# Patient Record
Sex: FEMALE | Race: OTHER | Employment: UNEMPLOYED | URBAN - METROPOLITAN AREA
[De-identification: names, ages, dates, MRNs, and addresses within clinical notes are randomized per-mention and may not be internally consistent; named-entity substitution may affect disease eponyms.]

---

## 2024-02-17 ENCOUNTER — HOSPITAL ENCOUNTER (INPATIENT)
Facility: HOSPITAL | Age: 54
LOS: 10 days | Discharge: NURSING, REHAB OR LONG-TERM CARE | DRG: 602 | End: 2024-02-27
Attending: EMERGENCY MEDICINE | Admitting: INTERNAL MEDICINE
Payer: COMMERCIAL

## 2024-02-17 DIAGNOSIS — L03.115 CELLULITIS OF RIGHT LOWER EXTREMITY: Primary | ICD-10-CM

## 2024-02-17 PROBLEM — D64.9 ANEMIA: Status: ACTIVE | Noted: 2024-02-17

## 2024-02-17 PROBLEM — Z21 ASYMPTOMATIC HIV INFECTION, WITH NO HISTORY OF HIV-RELATED ILLNESS (HCC): Status: ACTIVE | Noted: 2024-02-17

## 2024-02-17 PROBLEM — E66.01 CLASS 3 SEVERE OBESITY DUE TO EXCESS CALORIES WITH SERIOUS COMORBIDITY AND BODY MASS INDEX (BMI) OF 60.0 TO 69.9 IN ADULT (HCC): Status: ACTIVE | Noted: 2024-02-17

## 2024-02-17 PROBLEM — J96.01 ACUTE RESPIRATORY FAILURE WITH HYPOXIA (HCC): Status: ACTIVE | Noted: 2024-02-17

## 2024-02-17 PROBLEM — R26.2 AMBULATORY DYSFUNCTION: Status: ACTIVE | Noted: 2024-02-17

## 2024-02-17 PROBLEM — E66.813 CLASS 3 SEVERE OBESITY DUE TO EXCESS CALORIES WITH SERIOUS COMORBIDITY AND BODY MASS INDEX (BMI) OF 60.0 TO 69.9 IN ADULT (HCC): Status: ACTIVE | Noted: 2024-02-17

## 2024-02-17 PROBLEM — E87.20 LACTIC ACIDEMIA: Status: ACTIVE | Noted: 2024-02-17

## 2024-02-17 PROBLEM — E66.2 OBESITY HYPOVENTILATION SYNDROME (HCC): Status: ACTIVE | Noted: 2024-02-17

## 2024-02-17 LAB
ALBUMIN SERPL BCP-MCNC: 3.9 G/DL (ref 3.5–5)
ALP SERPL-CCNC: 46 U/L (ref 34–104)
ALT SERPL W P-5'-P-CCNC: 38 U/L (ref 7–52)
ANION GAP SERPL CALCULATED.3IONS-SCNC: 9 MMOL/L
AST SERPL W P-5'-P-CCNC: 29 U/L (ref 13–39)
BASOPHILS # BLD AUTO: 0.04 THOUSANDS/ÂΜL (ref 0–0.1)
BASOPHILS NFR BLD AUTO: 1 % (ref 0–1)
BILIRUB SERPL-MCNC: 0.45 MG/DL (ref 0.2–1)
BUN SERPL-MCNC: 12 MG/DL (ref 5–25)
CALCIUM SERPL-MCNC: 9.6 MG/DL (ref 8.4–10.2)
CHLORIDE SERPL-SCNC: 106 MMOL/L (ref 96–108)
CO2 SERPL-SCNC: 26 MMOL/L (ref 21–32)
CREAT SERPL-MCNC: 0.82 MG/DL (ref 0.6–1.3)
EOSINOPHIL # BLD AUTO: 0.19 THOUSAND/ÂΜL (ref 0–0.61)
EOSINOPHIL NFR BLD AUTO: 4 % (ref 0–6)
ERYTHROCYTE [DISTWIDTH] IN BLOOD BY AUTOMATED COUNT: 18.4 % (ref 11.6–15.1)
GFR SERPL CREATININE-BSD FRML MDRD: 81 ML/MIN/1.73SQ M
GLUCOSE SERPL-MCNC: 105 MG/DL (ref 65–140)
HCT VFR BLD AUTO: 36.6 % (ref 34.8–46.1)
HGB BLD-MCNC: 11 G/DL (ref 11.5–15.4)
IMM GRANULOCYTES # BLD AUTO: 0.02 THOUSAND/UL (ref 0–0.2)
IMM GRANULOCYTES NFR BLD AUTO: 0 % (ref 0–2)
LACTATE SERPL-SCNC: 2.4 MMOL/L (ref 0.5–2)
LYMPHOCYTES # BLD AUTO: 1.29 THOUSANDS/ÂΜL (ref 0.6–4.47)
LYMPHOCYTES NFR BLD AUTO: 26 % (ref 14–44)
MCH RBC QN AUTO: 24.6 PG (ref 26.8–34.3)
MCHC RBC AUTO-ENTMCNC: 30.1 G/DL (ref 31.4–37.4)
MCV RBC AUTO: 82 FL (ref 82–98)
MONOCYTES # BLD AUTO: 0.44 THOUSAND/ÂΜL (ref 0.17–1.22)
MONOCYTES NFR BLD AUTO: 9 % (ref 4–12)
NEUTROPHILS # BLD AUTO: 2.92 THOUSANDS/ÂΜL (ref 1.85–7.62)
NEUTS SEG NFR BLD AUTO: 60 % (ref 43–75)
NRBC BLD AUTO-RTO: 0 /100 WBCS
PLATELET # BLD AUTO: 269 THOUSANDS/UL (ref 149–390)
PMV BLD AUTO: 9.3 FL (ref 8.9–12.7)
POTASSIUM SERPL-SCNC: 3.8 MMOL/L (ref 3.5–5.3)
PROT SERPL-MCNC: 7 G/DL (ref 6.4–8.4)
RBC # BLD AUTO: 4.47 MILLION/UL (ref 3.81–5.12)
SODIUM SERPL-SCNC: 141 MMOL/L (ref 135–147)
WBC # BLD AUTO: 4.9 THOUSAND/UL (ref 4.31–10.16)

## 2024-02-17 PROCEDURE — 99223 1ST HOSP IP/OBS HIGH 75: CPT | Performed by: INTERNAL MEDICINE

## 2024-02-17 PROCEDURE — 36415 COLL VENOUS BLD VENIPUNCTURE: CPT | Performed by: NURSE PRACTITIONER

## 2024-02-17 PROCEDURE — 87040 BLOOD CULTURE FOR BACTERIA: CPT | Performed by: NURSE PRACTITIONER

## 2024-02-17 PROCEDURE — 99283 EMERGENCY DEPT VISIT LOW MDM: CPT

## 2024-02-17 PROCEDURE — 80053 COMPREHEN METABOLIC PANEL: CPT | Performed by: NURSE PRACTITIONER

## 2024-02-17 PROCEDURE — 83605 ASSAY OF LACTIC ACID: CPT | Performed by: NURSE PRACTITIONER

## 2024-02-17 PROCEDURE — 85025 COMPLETE CBC W/AUTO DIFF WBC: CPT | Performed by: NURSE PRACTITIONER

## 2024-02-17 PROCEDURE — 96367 TX/PROPH/DG ADDL SEQ IV INF: CPT

## 2024-02-17 PROCEDURE — 96365 THER/PROPH/DIAG IV INF INIT: CPT

## 2024-02-17 PROCEDURE — 99285 EMERGENCY DEPT VISIT HI MDM: CPT | Performed by: NURSE PRACTITIONER

## 2024-02-17 PROCEDURE — 96375 TX/PRO/DX INJ NEW DRUG ADDON: CPT

## 2024-02-17 RX ORDER — DARUNAVIR, COBICISTAT, EMTRICITABINE, AND TENOFOVIR ALAFENAMIDE 800; 150; 200; 10 MG/1; MG/1; MG/1; MG/1
1 TABLET, FILM COATED ORAL DAILY
COMMUNITY

## 2024-02-17 RX ORDER — BACLOFEN 10 MG/1
20 TABLET ORAL 3 TIMES DAILY
Status: DISCONTINUED | OUTPATIENT
Start: 2024-02-18 | End: 2024-02-27 | Stop reason: HOSPADM

## 2024-02-17 RX ORDER — CEFTRIAXONE 1 G/50ML
1000 INJECTION, SOLUTION INTRAVENOUS EVERY 24 HOURS
Status: DISCONTINUED | OUTPATIENT
Start: 2024-02-18 | End: 2024-02-17

## 2024-02-17 RX ORDER — ENOXAPARIN SODIUM 100 MG/ML
30 INJECTION SUBCUTANEOUS EVERY 12 HOURS SCHEDULED
Status: DISCONTINUED | OUTPATIENT
Start: 2024-02-17 | End: 2024-02-17

## 2024-02-17 RX ORDER — GABAPENTIN 100 MG/1
100 CAPSULE ORAL 3 TIMES DAILY
Status: DISCONTINUED | OUTPATIENT
Start: 2024-02-17 | End: 2024-02-27 | Stop reason: HOSPADM

## 2024-02-17 RX ORDER — SODIUM CHLORIDE, SODIUM GLUCONATE, SODIUM ACETATE, POTASSIUM CHLORIDE, MAGNESIUM CHLORIDE, SODIUM PHOSPHATE, DIBASIC, AND POTASSIUM PHOSPHATE .53; .5; .37; .037; .03; .012; .00082 G/100ML; G/100ML; G/100ML; G/100ML; G/100ML; G/100ML; G/100ML
1000 INJECTION, SOLUTION INTRAVENOUS ONCE
Status: COMPLETED | OUTPATIENT
Start: 2024-02-17 | End: 2024-02-18

## 2024-02-17 RX ORDER — ENOXAPARIN SODIUM 100 MG/ML
60 INJECTION SUBCUTANEOUS EVERY 12 HOURS SCHEDULED
Status: DISCONTINUED | OUTPATIENT
Start: 2024-02-17 | End: 2024-02-27 | Stop reason: HOSPADM

## 2024-02-17 RX ORDER — HYDRALAZINE HYDROCHLORIDE 20 MG/ML
10 INJECTION INTRAMUSCULAR; INTRAVENOUS EVERY 6 HOURS PRN
Status: DISCONTINUED | OUTPATIENT
Start: 2024-02-17 | End: 2024-02-27 | Stop reason: HOSPADM

## 2024-02-17 RX ORDER — SODIUM CHLORIDE, SODIUM GLUCONATE, SODIUM ACETATE, POTASSIUM CHLORIDE, MAGNESIUM CHLORIDE, SODIUM PHOSPHATE, DIBASIC, AND POTASSIUM PHOSPHATE .53; .5; .37; .037; .03; .012; .00082 G/100ML; G/100ML; G/100ML; G/100ML; G/100ML; G/100ML; G/100ML
1000 INJECTION, SOLUTION INTRAVENOUS ONCE
Status: COMPLETED | OUTPATIENT
Start: 2024-02-17 | End: 2024-02-17

## 2024-02-17 RX ORDER — OXYCODONE HYDROCHLORIDE 5 MG/1
5 TABLET ORAL EVERY 4 HOURS PRN
Status: DISCONTINUED | OUTPATIENT
Start: 2024-02-17 | End: 2024-02-22

## 2024-02-17 RX ORDER — CEFTRIAXONE 2 G/50ML
2000 INJECTION, SOLUTION INTRAVENOUS EVERY 24 HOURS
Status: DISCONTINUED | OUTPATIENT
Start: 2024-02-18 | End: 2024-02-22

## 2024-02-17 RX ORDER — GABAPENTIN 300 MG/1
300 CAPSULE ORAL 3 TIMES DAILY
COMMUNITY
End: 2024-02-27

## 2024-02-17 RX ORDER — CEFTRIAXONE 1 G/50ML
1000 INJECTION, SOLUTION INTRAVENOUS ONCE
Status: COMPLETED | OUTPATIENT
Start: 2024-02-17 | End: 2024-02-17

## 2024-02-17 RX ORDER — LABETALOL HYDROCHLORIDE 5 MG/ML
10 INJECTION, SOLUTION INTRAVENOUS EVERY 6 HOURS PRN
Status: DISCONTINUED | OUTPATIENT
Start: 2024-02-17 | End: 2024-02-27 | Stop reason: HOSPADM

## 2024-02-17 RX ORDER — MORPHINE SULFATE 4 MG/ML
4 INJECTION, SOLUTION INTRAMUSCULAR; INTRAVENOUS ONCE
Status: COMPLETED | OUTPATIENT
Start: 2024-02-17 | End: 2024-02-17

## 2024-02-17 RX ORDER — BACLOFEN 20 MG/1
20 TABLET ORAL 3 TIMES DAILY
COMMUNITY

## 2024-02-17 RX ORDER — ACETAMINOPHEN 325 MG/1
650 TABLET ORAL EVERY 6 HOURS PRN
Status: DISCONTINUED | OUTPATIENT
Start: 2024-02-17 | End: 2024-02-27 | Stop reason: HOSPADM

## 2024-02-17 RX ORDER — HYDROMORPHONE HCL IN WATER/PF 6 MG/30 ML
0.2 PATIENT CONTROLLED ANALGESIA SYRINGE INTRAVENOUS EVERY 4 HOURS PRN
Status: DISCONTINUED | OUTPATIENT
Start: 2024-02-17 | End: 2024-02-22

## 2024-02-17 RX ORDER — HEPARIN SODIUM 5000 [USP'U]/ML
5000 INJECTION, SOLUTION INTRAVENOUS; SUBCUTANEOUS EVERY 8 HOURS SCHEDULED
Status: DISCONTINUED | OUTPATIENT
Start: 2024-02-17 | End: 2024-02-17

## 2024-02-17 RX ORDER — SODIUM CHLORIDE, SODIUM GLUCONATE, SODIUM ACETATE, POTASSIUM CHLORIDE, MAGNESIUM CHLORIDE, SODIUM PHOSPHATE, DIBASIC, AND POTASSIUM PHOSPHATE .53; .5; .37; .037; .03; .012; .00082 G/100ML; G/100ML; G/100ML; G/100ML; G/100ML; G/100ML; G/100ML
125 INJECTION, SOLUTION INTRAVENOUS CONTINUOUS
Status: DISCONTINUED | OUTPATIENT
Start: 2024-02-17 | End: 2024-02-18

## 2024-02-17 RX ADMIN — VANCOMYCIN HYDROCHLORIDE 2000 MG: 10 INJECTION, POWDER, LYOPHILIZED, FOR SOLUTION INTRAVENOUS at 22:05

## 2024-02-17 RX ADMIN — CEFTRIAXONE 1000 MG: 1 INJECTION, SOLUTION INTRAVENOUS at 21:03

## 2024-02-17 RX ADMIN — MORPHINE SULFATE 4 MG: 4 INJECTION INTRAVENOUS at 21:00

## 2024-02-17 RX ADMIN — SODIUM CHLORIDE, SODIUM GLUCONATE, SODIUM ACETATE, POTASSIUM CHLORIDE, MAGNESIUM CHLORIDE, SODIUM PHOSPHATE, DIBASIC, AND POTASSIUM PHOSPHATE 1000 ML: .53; .5; .37; .037; .03; .012; .00082 INJECTION, SOLUTION INTRAVENOUS at 21:49

## 2024-02-17 RX ADMIN — SODIUM CHLORIDE, SODIUM GLUCONATE, SODIUM ACETATE, POTASSIUM CHLORIDE, MAGNESIUM CHLORIDE, SODIUM PHOSPHATE, DIBASIC, AND POTASSIUM PHOSPHATE 1000 ML: .53; .5; .37; .037; .03; .012; .00082 INJECTION, SOLUTION INTRAVENOUS at 23:46

## 2024-02-18 ENCOUNTER — APPOINTMENT (INPATIENT)
Dept: CT IMAGING | Facility: HOSPITAL | Age: 54
DRG: 602 | End: 2024-02-18
Payer: COMMERCIAL

## 2024-02-18 PROBLEM — E87.20 LACTIC ACIDEMIA: Status: RESOLVED | Noted: 2024-02-17 | Resolved: 2024-02-18

## 2024-02-18 LAB
ALBUMIN SERPL BCP-MCNC: 3.7 G/DL (ref 3.5–5)
ALP SERPL-CCNC: 44 U/L (ref 34–104)
ALT SERPL W P-5'-P-CCNC: 33 U/L (ref 7–52)
ANION GAP SERPL CALCULATED.3IONS-SCNC: 8 MMOL/L
AST SERPL W P-5'-P-CCNC: 26 U/L (ref 13–39)
BASOPHILS # BLD AUTO: 0.03 THOUSANDS/ÂΜL (ref 0–0.1)
BASOPHILS NFR BLD AUTO: 1 % (ref 0–1)
BILIRUB SERPL-MCNC: 0.46 MG/DL (ref 0.2–1)
BUN SERPL-MCNC: 11 MG/DL (ref 5–25)
CALCIUM SERPL-MCNC: 9.1 MG/DL (ref 8.4–10.2)
CHLORIDE SERPL-SCNC: 107 MMOL/L (ref 96–108)
CO2 SERPL-SCNC: 24 MMOL/L (ref 21–32)
CREAT SERPL-MCNC: 0.73 MG/DL (ref 0.6–1.3)
EOSINOPHIL # BLD AUTO: 0.23 THOUSAND/ÂΜL (ref 0–0.61)
EOSINOPHIL NFR BLD AUTO: 5 % (ref 0–6)
ERYTHROCYTE [DISTWIDTH] IN BLOOD BY AUTOMATED COUNT: 18.4 % (ref 11.6–15.1)
GFR SERPL CREATININE-BSD FRML MDRD: 94 ML/MIN/1.73SQ M
GLUCOSE SERPL-MCNC: 99 MG/DL (ref 65–140)
HCT VFR BLD AUTO: 36.8 % (ref 34.8–46.1)
HGB BLD-MCNC: 10.6 G/DL (ref 11.5–15.4)
IMM GRANULOCYTES # BLD AUTO: 0.02 THOUSAND/UL (ref 0–0.2)
IMM GRANULOCYTES NFR BLD AUTO: 0 % (ref 0–2)
LACTATE SERPL-SCNC: 1.7 MMOL/L (ref 0.5–2)
LYMPHOCYTES # BLD AUTO: 1.66 THOUSANDS/ÂΜL (ref 0.6–4.47)
LYMPHOCYTES NFR BLD AUTO: 34 % (ref 14–44)
MAGNESIUM SERPL-MCNC: 2 MG/DL (ref 1.9–2.7)
MCH RBC QN AUTO: 24.3 PG (ref 26.8–34.3)
MCHC RBC AUTO-ENTMCNC: 28.8 G/DL (ref 31.4–37.4)
MCV RBC AUTO: 84 FL (ref 82–98)
MONOCYTES # BLD AUTO: 0.52 THOUSAND/ÂΜL (ref 0.17–1.22)
MONOCYTES NFR BLD AUTO: 11 % (ref 4–12)
NEUTROPHILS # BLD AUTO: 2.5 THOUSANDS/ÂΜL (ref 1.85–7.62)
NEUTS SEG NFR BLD AUTO: 49 % (ref 43–75)
NRBC BLD AUTO-RTO: 0 /100 WBCS
PLATELET # BLD AUTO: 104 THOUSANDS/UL (ref 149–390)
PLATELET # BLD AUTO: 255 THOUSANDS/UL (ref 149–390)
PMV BLD AUTO: 9.3 FL (ref 8.9–12.7)
POTASSIUM SERPL-SCNC: 3.9 MMOL/L (ref 3.5–5.3)
PROT SERPL-MCNC: 6.8 G/DL (ref 6.4–8.4)
RBC # BLD AUTO: 4.37 MILLION/UL (ref 3.81–5.12)
SODIUM SERPL-SCNC: 139 MMOL/L (ref 135–147)
WBC # BLD AUTO: 4.96 THOUSAND/UL (ref 4.31–10.16)

## 2024-02-18 PROCEDURE — G1004 CDSM NDSC: HCPCS

## 2024-02-18 PROCEDURE — 73701 CT LOWER EXTREMITY W/DYE: CPT

## 2024-02-18 PROCEDURE — 99232 SBSQ HOSP IP/OBS MODERATE 35: CPT | Performed by: STUDENT IN AN ORGANIZED HEALTH CARE EDUCATION/TRAINING PROGRAM

## 2024-02-18 PROCEDURE — 85025 COMPLETE CBC W/AUTO DIFF WBC: CPT | Performed by: INTERNAL MEDICINE

## 2024-02-18 PROCEDURE — 94660 CPAP INITIATION&MGMT: CPT

## 2024-02-18 PROCEDURE — 80053 COMPREHEN METABOLIC PANEL: CPT | Performed by: INTERNAL MEDICINE

## 2024-02-18 PROCEDURE — 85049 AUTOMATED PLATELET COUNT: CPT | Performed by: INTERNAL MEDICINE

## 2024-02-18 PROCEDURE — 94760 N-INVAS EAR/PLS OXIMETRY 1: CPT

## 2024-02-18 PROCEDURE — 83605 ASSAY OF LACTIC ACID: CPT | Performed by: INTERNAL MEDICINE

## 2024-02-18 PROCEDURE — 97166 OT EVAL MOD COMPLEX 45 MIN: CPT | Performed by: OCCUPATIONAL THERAPIST

## 2024-02-18 PROCEDURE — 83735 ASSAY OF MAGNESIUM: CPT | Performed by: INTERNAL MEDICINE

## 2024-02-18 RX ORDER — HYDROXYZINE HYDROCHLORIDE 25 MG/1
50 TABLET, FILM COATED ORAL EVERY 6 HOURS PRN
Status: DISCONTINUED | OUTPATIENT
Start: 2024-02-18 | End: 2024-02-23

## 2024-02-18 RX ORDER — SODIUM CHLORIDE, SODIUM GLUCONATE, SODIUM ACETATE, POTASSIUM CHLORIDE, MAGNESIUM CHLORIDE, SODIUM PHOSPHATE, DIBASIC, AND POTASSIUM PHOSPHATE .53; .5; .37; .037; .03; .012; .00082 G/100ML; G/100ML; G/100ML; G/100ML; G/100ML; G/100ML; G/100ML
75 INJECTION, SOLUTION INTRAVENOUS CONTINUOUS
Status: DISPENSED | OUTPATIENT
Start: 2024-02-18 | End: 2024-02-19

## 2024-02-18 RX ADMIN — ENOXAPARIN SODIUM 60 MG: 60 INJECTION SUBCUTANEOUS at 00:04

## 2024-02-18 RX ADMIN — HYDROMORPHONE HYDROCHLORIDE 0.2 MG: 0.2 INJECTION, SOLUTION INTRAMUSCULAR; INTRAVENOUS; SUBCUTANEOUS at 05:45

## 2024-02-18 RX ADMIN — HYDROMORPHONE HYDROCHLORIDE 0.2 MG: 0.2 INJECTION, SOLUTION INTRAMUSCULAR; INTRAVENOUS; SUBCUTANEOUS at 19:48

## 2024-02-18 RX ADMIN — HYDROMORPHONE HYDROCHLORIDE 0.2 MG: 0.2 INJECTION, SOLUTION INTRAMUSCULAR; INTRAVENOUS; SUBCUTANEOUS at 09:39

## 2024-02-18 RX ADMIN — OXYCODONE HYDROCHLORIDE 5 MG: 5 TABLET ORAL at 00:04

## 2024-02-18 RX ADMIN — HYDROXYZINE HYDROCHLORIDE 50 MG: 25 TABLET ORAL at 04:04

## 2024-02-18 RX ADMIN — HYDROMORPHONE HYDROCHLORIDE 0.2 MG: 0.2 INJECTION, SOLUTION INTRAMUSCULAR; INTRAVENOUS; SUBCUTANEOUS at 01:04

## 2024-02-18 RX ADMIN — GABAPENTIN 100 MG: 100 CAPSULE ORAL at 21:24

## 2024-02-18 RX ADMIN — VANCOMYCIN HYDROCHLORIDE 1500 MG: 5 INJECTION, POWDER, LYOPHILIZED, FOR SOLUTION INTRAVENOUS at 15:12

## 2024-02-18 RX ADMIN — IOHEXOL 100 ML: 350 INJECTION, SOLUTION INTRAVENOUS at 12:45

## 2024-02-18 RX ADMIN — BACLOFEN 20 MG: 10 TABLET ORAL at 08:09

## 2024-02-18 RX ADMIN — BACLOFEN 20 MG: 10 TABLET ORAL at 00:01

## 2024-02-18 RX ADMIN — HYDROXYZINE HYDROCHLORIDE 50 MG: 25 TABLET ORAL at 11:11

## 2024-02-18 RX ADMIN — ENOXAPARIN SODIUM 60 MG: 60 INJECTION SUBCUTANEOUS at 08:09

## 2024-02-18 RX ADMIN — HYDROMORPHONE HYDROCHLORIDE 0.2 MG: 0.2 INJECTION, SOLUTION INTRAMUSCULAR; INTRAVENOUS; SUBCUTANEOUS at 15:13

## 2024-02-18 RX ADMIN — OXYCODONE HYDROCHLORIDE 5 MG: 5 TABLET ORAL at 04:04

## 2024-02-18 RX ADMIN — CEFTRIAXONE 2000 MG: 2 INJECTION, SOLUTION INTRAVENOUS at 18:31

## 2024-02-18 RX ADMIN — GABAPENTIN 100 MG: 100 CAPSULE ORAL at 15:13

## 2024-02-18 RX ADMIN — VANCOMYCIN HYDROCHLORIDE 1500 MG: 5 INJECTION, POWDER, LYOPHILIZED, FOR SOLUTION INTRAVENOUS at 22:39

## 2024-02-18 RX ADMIN — OXYCODONE HYDROCHLORIDE 5 MG: 5 TABLET ORAL at 18:31

## 2024-02-18 RX ADMIN — GABAPENTIN 100 MG: 100 CAPSULE ORAL at 08:09

## 2024-02-18 RX ADMIN — SODIUM CHLORIDE, SODIUM GLUCONATE, SODIUM ACETATE, POTASSIUM CHLORIDE, MAGNESIUM CHLORIDE, SODIUM PHOSPHATE, DIBASIC, AND POTASSIUM PHOSPHATE 75 ML/HR: .53; .5; .37; .037; .03; .012; .00082 INJECTION, SOLUTION INTRAVENOUS at 09:23

## 2024-02-18 RX ADMIN — BACLOFEN 20 MG: 10 TABLET ORAL at 21:24

## 2024-02-18 RX ADMIN — SODIUM CHLORIDE, SODIUM GLUCONATE, SODIUM ACETATE, POTASSIUM CHLORIDE, MAGNESIUM CHLORIDE, SODIUM PHOSPHATE, DIBASIC, AND POTASSIUM PHOSPHATE 1000 ML: .53; .5; .37; .037; .03; .012; .00082 INJECTION, SOLUTION INTRAVENOUS at 01:00

## 2024-02-18 RX ADMIN — BACLOFEN 20 MG: 10 TABLET ORAL at 15:13

## 2024-02-18 RX ADMIN — ENOXAPARIN SODIUM 60 MG: 60 INJECTION SUBCUTANEOUS at 21:24

## 2024-02-18 RX ADMIN — GABAPENTIN 100 MG: 100 CAPSULE ORAL at 00:01

## 2024-02-18 NOTE — PLAN OF CARE
Problem: OCCUPATIONAL THERAPY ADULT  Goal: Performs self-care activities at highest level of function for planned discharge setting.  See evaluation for individualized goals.  Description: Treatment Interventions: ADL retraining, Functional transfer training, UE strengthening/ROM, Endurance training, Neuromuscular reeducation, Compensatory technique education, Energy conservation, Activityengagement  Equipment Recommended: Shower/Tub chair with back ($), Hip Kit ($)       See flowsheet documentation for full assessment, interventions and recommendations.   Outcome: Progressing  Note: Limitation: Decreased ADL status, Decreased UE strength, Decreased Safe judgement during ADL, Decreased endurance  Prognosis: Fair  Assessment: Patient is a 53 y.o. year old female seen for OT eval s/p admit to [facility] on 2/17/2024 with Robyn Eloy is a 53  worsening lower extremity pain on the right and PMHx of HIV, morbid obesity, prior right lower extremity infection status postdebridement and grafting. She did have a blister in the right lower extremity that had popped a few days prior to presentation.  She was treating the leg with over-the-counter medications but noticed redness and pain had been worsening in severity.Patient with active OT orders and activity orders for Activity as tolerated, Up and OOB as tolerated . OT consulted to assess ADLs/IADLs/functional mobility and assist w/ D/C planning. PTA, required (A) with ADLs, required (A) with IADLs, and required (A) for functional mobility/transfers. Patient lives in a home with childrens, able to stay on 1st floor. Patient demonstrates the following deficits impacting occupational performance: weakness, decreased strength , decreased balance, decreased activity tolerance, decreased safety awareness, increased pain, and SOB. These impairments, as well at pt’s difficulty performing ADLs, difficulty performing IADLs, difficulty performing transfers/mobility, and fall risk ,  limit pt’s ability to safely engage in all baseline areas of occupation. Pt's CLOF as follows: eating/grooming: supervision , UB ADLs: Kristan, LB ADLs: maxA and dependent, toileting: maxA and dependent, bed mobility: DNT, functional transfers: Kristan, functional mobility: Kristan, sitting/standing tolerance: P+/P+. Pt would benefit from continued skilled OT while in acute setting to address deficits as defined above and to maximize (I) w/ ADLs/functional mobility. Occupational performance areas to address include: grooming, bathing/shower, toilet hygiene, dressing, functional mobility, community mobility, and clothing management. Based on the aforementioned evaluation, functional performance deficits, and assessments, pt has been identified as a moderate complexity evaluation. At this time, recommendation for pt to receive post-acute rehabilitation services at a Level III (minimum resource intensity) due to above deficits and CLOF. OT will continue to follow pt 2-3x/wk to address the goals listed below to  w/in 10-14 days.     Rehab Resource Intensity Level, OT: III (Minimum Resource Intensity)

## 2024-02-18 NOTE — RESPIRATORY THERAPY NOTE
02/18/24 0316   Respiratory Assessment   Resp Comments placed pt on cpap for HS use   O2 Device v30 Wheezy   Non-Invasive Information   O2 Interface Device Face mask   Non-Invasive Ventilation Mode CPAP   SpO2 97 %   Non-Invasive Settings   FiO2 (%)   (1L o2 bleed in)   PEEP/CPAP (cm H2O)   (auto 8-18 cmH2o)   Rise Time 2   Humidification   (n/a)   Non-Invasive Readings   Skin Intervention Skin intact   Total Rate 18   Spontaneous Vt (mL) 1162   Spontaneous MV (mL) 21.3   Auto PEEP Observed (cm H2O) 13   I/E Ratio (Obs) 1:2.2   Leak (lpm) 21   Non-Invasive Alarms   Insp Pressure Low (cm H20) 60   MV Low (L/min) 2   High Resp Rate (BPM) 40 BPM   Apnea Interval (sec) 30     Auto 8-18 cmH2o applied but pt felt like the pressure was not enough and that it felt like she was suffocating, changed setting to auto 10-20 cmH2o but she has same complaint(s), switched to cpap +15 but pt continues to feel like she is still suffocating, changed mode to auto bipap of min epap 8 cmH2o and maximum ipap 20 cmH2o, pt is comfortable with this mode and tolerant of the two pressures

## 2024-02-18 NOTE — OCCUPATIONAL THERAPY NOTE
Occupational Therapy Evaluation     Patient Name: Robyn Lyons  Today's Date: 2/18/2024  Problem List  Principal Problem:    Cellulitis of right lower extremity  Active Problems:    Lactic acidemia    Class 3 severe obesity due to excess calories with serious comorbidity and body mass index (BMI) of 60.0 to 69.9 in adult (HCC)    Anemia    Acute respiratory failure with hypoxia (HCC)    Asymptomatic HIV infection, with no history of HIV-related illness (HCC)    Obesity hypoventilation syndrome (HCC)    Ambulatory dysfunction    Past Medical History  Past Medical History:   Diagnosis Date    HIV (human immunodeficiency virus infection) (HCC)      Past Surgical History  History reviewed. No pertinent surgical history.        02/18/24 0827   OT Last Visit   OT Visit Date 02/18/24   Note Type   Note type Evaluation   Pain Assessment   Pain Assessment Tool 0-10   Pain Score 7   Pain Location/Orientation Orientation: Bilateral;Location: Leg  (+ sciatic type pain on L; wound on R)   Hospital Pain Intervention(s) Repositioned;Ambulation/increased activity   Restrictions/Precautions   Weight Bearing Precautions Per Order No   Other Precautions Contact/isolation;Chair Alarm;Bed Alarm;O2;Fall Risk;Telemetry;Multiple lines  (As per infection control- Contact isolation; 2L 02; 02 dec to 91 with short functional mob to BSC on RA)   Home Living   Type of Home House   Home Layout Multi-level;Performs ADLs on one level;Able to live on main level with bedroom/bathroom;Stairs to enter with rails  (5 HERB)   Bathroom Shower/Tub Walk-in shower   Bathroom Toilet Raised   Bathroom Equipment Grab bars in shower   Bathroom Accessibility Accessible   Home Equipment Walker  (rollator; don't use it all the time but there when needed)   Additional Comments will need shower chair as per pt.   Prior Function   Level of [place] Needs assistance with ADLs;Needs assistance with IADLS;Needs assistance with functional mobility   Lives  With Family  (3 sons 1 daughter- ages 19, 17, 12, 5)   Receives Help From Family   IADLs Family/Friend/Other provides transportation;Family/Friend/Other provides meals;Family/Friend/Other provides medication management   Falls in the last 6 months 1 to 4  (1 time in the bathroom)   General   Additional Pertinent History Robyn Lyons is a 53 y.o. female who presents with worsening lower extremity pain on the right     PMHx of HIV, morbid obesity, prior right lower extremity infection status postdebridement and grafting     She did have a blister in the right lower extremity that had popped a few days prior to presentation.  She was treating the leg with over-the-counter medications but noticed redness and pain had been worsening in severity.   Subjective   Subjective I feel like i need oxygen   ADL   Eating Assistance 5  Supervision/Setup   Grooming Assistance 5  Supervision/Setup   UB Bathing Assistance 3  Moderate Assistance   LB Bathing Assistance 1  Total Assistance   UB Dressing Assistance 4  Minimal Assistance   LB Dressing Assistance 2  Maximal Assistance   LB Dressing Deficit   (Max A pants/underwear (can pull up when able to reach); Total A socks/shoes)   Toileting Assistance  2  Maximal Assistance   Bed Mobility   Additional Comments bed mobility not assessed; pt in side recliner and reports she sleeps in one at home   Transfers   Sit to Stand 4  Minimal assistance   Additional items Assist x 1;Armrests;Increased time required   Stand to Sit 4  Minimal assistance   Additional items Assist x 1;Increased time required   Additional Comments Min A for recliner chair 2* elevated chair.   Functional Mobility   Functional Mobility 4  Minimal assistance   Additional items Rolling walker   Balance   Static Sitting Fair   Dynamic Sitting Poor   Static Standing Fair   Dynamic Standing Fair -   Activity Tolerance   Activity Tolerance Patient limited by fatigue   Nurse Made Aware RN aware   RUE Assessment   RUE  Assessment WFL   LUE Assessment   LUE Assessment WFL   Hand Function   Gross Motor Coordination Functional   Fine Motor Coordination Functional   Psychosocial   Psychosocial (WDL) WDL   Cognition   Overall Cognitive Status WFL   Arousal/Participation Alert;Responsive;Cooperative   Attention Within functional limits   Orientation Level Oriented X4   Memory Within functional limits   Following Commands Follows all commands and directions without difficulty   Assessment   Limitation Decreased ADL status;Decreased UE strength;Decreased Safe judgement during ADL;Decreased endurance   Prognosis Fair   Assessment Patient is a 53 y.o. year old female seen for OT eval s/p admit to [facility] on 2/17/2024 with Robyn Eloy is a 53  worsening lower extremity pain on the right and PMHx of HIV, morbid obesity, prior right lower extremity infection status postdebridement and grafting. She did have a blister in the right lower extremity that had popped a few days prior to presentation.  She was treating the leg with over-the-counter medications but noticed redness and pain had been worsening in severity.Patient with active OT orders and activity orders for Activity as tolerated, Up and OOB as tolerated . OT consulted to assess ADLs/IADLs/functional mobility and assist w/ D/C planning. PTA, required (A) with ADLs, required (A) with IADLs, and required (A) for functional mobility/transfers. Patient lives in a home with childrens, able to stay on 1st floor. Patient demonstrates the following deficits impacting occupational performance: weakness, decreased strength , decreased balance, decreased activity tolerance, decreased safety awareness, increased pain, and SOB. These impairments, as well at pt’s difficulty performing ADLs, difficulty performing IADLs, difficulty performing transfers/mobility, and fall risk , limit pt’s ability to safely engage in all baseline areas of occupation. Pt's CLOF as follows: eating/grooming: supervision  , UB ADLs: Kristan, LB ADLs: maxA and dependent, toileting: maxA and dependent, bed mobility: DNT, functional transfers: Kristan, functional mobility: Kristan, sitting/standing tolerance: P+/P+. Pt would benefit from continued skilled OT while in acute setting to address deficits as defined above and to maximize (I) w/ ADLs/functional mobility. Occupational performance areas to address include: grooming, bathing/shower, toilet hygiene, dressing, functional mobility, community mobility, and clothing management. Based on the aforementioned evaluation, functional performance deficits, and assessments, pt has been identified as a moderate complexity evaluation. At this time, recommendation for pt to receive post-acute rehabilitation services at a Level III (minimum resource intensity) due to above deficits and CLOF. OT will continue to follow pt 2-3x/wk to address the goals listed below to  w/in 10-14 days.   Goals   Patient Goals to not feel so tired   LTG Time Frame 10-14   Long Term Goal 1- Pt will perf LBD/LBB with Min A with use of AE; 2- Pt will perf UBD and UBB with S/setup; 3- Pt will perf all aspects of toileting with Min A; 4- Pt will perf chair<>toilet t/f and functional mobility with Mod I; 5- Pt will perf standing sink side grooming for inc standing radha for 5 min with S/setup   Plan   Treatment Interventions ADL retraining;Functional transfer training;UE strengthening/ROM;Endurance training;Neuromuscular reeducation;Compensatory technique education;Energy conservation;Activityengagement   Goal Expiration Date 24   OT Treatment Day 0   OT Frequency 3-5x/wk   Discharge Recommendation   Rehab Resource Intensity Level, OT III (Minimum Resource Intensity)   Equipment Recommended Shower/Tub chair with back ($);Hip Kit ($)   AM-PAC Daily Activity Inpatient   Lower Body Dressing 1   Bathing 2   Toileting 2   Upper Body Dressing 3   Grooming 3   Eating 4   Daily Activity Raw Score 15   Daily Activity Standardized  Score (Calc for Raw Score >=11) 34.69   AM-PAC Applied Cognition Inpatient   Following a Speech/Presentation 4   Understanding Ordinary Conversation 4   Taking Medications 4   Remembering Where Things Are Placed or Put Away 3   Remembering List of 4-5 Errands 3   Taking Care of Complicated Tasks 3   Applied Cognition Raw Score 21   Applied Cognition Standardized Score 44.3     Jeimy Kaplan MS, OTR/L CLT

## 2024-02-18 NOTE — PROGRESS NOTES
Robyn Lyons is a 53 y.o. female who is currently ordered Vancomycin IV with management by the Pharmacy Consult service.  Relevant clinical data and objective / subjective history reviewed.  Vancomycin Assessment:  Indication and Goal AUC/Trough: Soft tissue (goal -600, trough >10)  Clinical Status:  new start  Micro:   pending  Renal Function:  SCr: 0.82 mg/dL  CrCl: 146.5 mL/min  Renal replacement: Not on dialysis  Days of Therapy: 1  Current Dose: 2000mg IV once  Vancomycin Plan:  New Dosing: then 1250mg IV q12h  Estimated AUC: 459 mcg*hr/mL  Estimated Trough: 11.8 mcg/mL  Next Level: 2/19/24 Am labs  Renal Function Monitoring: Daily BMP and UOP  Pharmacy will continue to follow closely for s/sx of nephrotoxicity, infusion reactions and appropriateness of therapy.  BMP and CBC will be ordered per protocol. We will continue to follow the patient’s culture results and clinical progress daily.    Nancy Samson, Pharmacist

## 2024-02-18 NOTE — ASSESSMENT & PLAN NOTE
Currently SpO2: 96 % requiring Nasal Cannula O2 Flow Rate (L/min): 1 L/min2L NC    At baseline, uses no supplemental, but feels she may need it    Plan:  Wean O2 as able  May need Home O2 on discharge

## 2024-02-18 NOTE — ASSESSMENT & PLAN NOTE
Patient complaining of significant shortness of breath while sleeping  May be attributed to obesity  Trial CPAP HS

## 2024-02-18 NOTE — PROGRESS NOTES
[facility]  Progress Note  Name: Robyn Lyons I  MRN: [id number]  Unit/Bed#: -01 I Date of Admission: 2/17/2024   Date of Service: 2/18/2024 I Hospital Day: 1    Assessment/Plan   Ambulatory dysfunction  Assessment & Plan  PT/OT Eval and treat  Trend Mobility Scores  Encourage appropriate mobility to achieve and improve upon HLM Goals as noted    Obesity hypoventilation syndrome (HCC)  Assessment & Plan  Patient complaining of significant shortness of breath while sleeping  May be attributed to obesity  Trial CPAP HS    Asymptomatic HIV infection, with no history of HIV-related illness (HCC)  Assessment & Plan  Continue home [place] [phone]    Acute respiratory failure with hypoxia (HCC)  Assessment & Plan    Currently SpO2: 96 % requiring Nasal Cannula O2 Flow Rate (L/min): 1 L/min2L NC    At baseline, uses no supplemental, but feels she may need it    Plan:  Wean O2 as able  May need Home O2 on discharge      Anemia  Assessment & Plan  Recent Labs     02/17/24 2050   HGB 11.0*   MCV 82     Plan:  Monitor CBC  Transfuse if Hb < 7      Class 3 severe obesity due to excess calories with serious comorbidity and body mass index (BMI) of 60.0 to 69.9 in adult ([facility])  Assessment & Plan  Body mass index is 68.99 kg/m².    Recommend incorporating a more whole foods plant-predominant diet along with decreasing consumption of red meats and processed foods  Per AHA guidelines, recommend moderate-vigorous intensity exercise for 30 minutes a day for 5 days a week or a total of 150 min/week    * Cellulitis of right lower extremity  Assessment & Plan  Reportedly had a blister in the right lower extremity that had popped a few days prior    Recent Labs     02/17/24 2050   WBC 4.90     Does not meet SIRS or sepsis criteria at this time  Initiated on IV antibiotics in the ED    Plan:  Venous duplex to rule out DVT  Continue vancomycin for now;  IV Fluid hydration  Follow up Blood clx,  wound clx, MRSA  CT LE with contrast pending  Wound care consult  PRN pain regimen  Trend fever/WBC curve  PT OT      Lactic acidemia-resolved as of 2024  Assessment & Plan  Recent Labs     24  0106   LACTICACID 2.4* 1.7     Resolved with IV fluids               VTE Pharmacologic Prophylaxis: VTE Score: 5 Moderate Risk (Score 3-4) - Pharmacological DVT Prophylaxis Ordered: enoxaparin (Lovenox).    Mobility:   Basic Mobility Inpatient Raw Score: 18  JH-HLM Goal: 6: Walk 10 steps or more  JH-HLM Achieved: 7: Walk 25 feet or more  HLM Goal achieved. Continue to encourage appropriate mobility.    Patient Centered Rounds: I performed bedside rounds with nursing staff today.   Discussions with Specialists or Other Care Team Provider: HERBERT    Education and Discussions with Family / Patient: Patient declined call to .     Total Time Spent on Date of Encounter in care of patient: 50  mins. This time was spent on one or more of the following: performing physical exam; counseling and coordination of care; obtaining or reviewing history; documenting in the medical record; reviewing/ordering tests, medications or procedures; communicating with other healthcare professionals and discussing with patient's family/caregivers.    Current Length of Stay: 1 day(s)  Current Patient Status: Inpatient   Certification Statement: The patient will continue to require additional inpatient hospital stay due to cellulitis, IV abx  Discharge Plan: Anticipate discharge in 24-48 hrs to home with home services.    Code Status: Level 1 - Full Code    Subjective:   Pt has no acute complaiints.     Objective:     Vitals:   Temp (24hrs), Av.1 °F (36.7 °C), Min:97.2 °F (36.2 °C), Max:98.8 °F (37.1 °C)    Temp:  [97.2 °F (36.2 °C)-98.8 °F (37.1 °C)] 97.6 °F (36.4 °C)  HR:  [78-94] 83  Resp:  [18-20] 18  BP: (134-174)/(70-83) 147/83  SpO2:  [92 %-98 %] 96 %  Body mass index is 69.06 kg/m².     Input and Output  Summary (last 24 hours):     Intake/Output Summary (Last 24 hours) at 2/18/2024 1431  Last data filed at 2/18/2024 0923  Gross per 24 hour   Intake 1110 ml   Output --   Net 1110 ml       Physical Exam:   Physical Exam  Constitutional:       General: She is not in acute distress.     Appearance: She is well-developed. She is obese. She is not diaphoretic.   HENT:      Head: Normocephalic and atraumatic.      Nose: Nose normal.      Mouth/Throat:      Pharynx: No oropharyngeal exudate.   Eyes:      General: No scleral icterus.        Right eye: No discharge.         Left eye: No discharge.      Conjunctiva/sclera: Conjunctivae normal.   Cardiovascular:      Rate and Rhythm: Normal rate and regular rhythm.      Heart sounds: Normal heart sounds. No murmur heard.     No friction rub. No gallop.   Pulmonary:      Effort: Pulmonary effort is normal. No respiratory distress.      Breath sounds: Normal breath sounds. No wheezing or rales.   Abdominal:      General: Bowel sounds are normal. There is no distension.      Palpations: Abdomen is soft.      Tenderness: There is no abdominal tenderness. There is no guarding.   Musculoskeletal:         General: Normal range of motion.      Cervical back: Normal range of motion and neck supple.   Skin:     General: Skin is warm.      Findings: Erythema present.      Comments: R LE  chronic scar, venostasis changes with erythema, edema, warmth. Demarcarted with marker.    Neurological:      Mental Status: She is oriented to person, place, and time.   Psychiatric:         Behavior: Behavior normal.          Additional Data:     Labs:  Results from last 7 days   Lab Units 02/18/24  0550   WBC Thousand/uL 4.96   HEMOGLOBIN g/dL 10.6*   HEMATOCRIT % 36.8   PLATELETS Thousands/uL 255   NEUTROS PCT % 49   LYMPHS PCT % 34   MONOS PCT % 11   EOS PCT % 5     Results from last 7 days   Lab Units 02/18/24  0550   SODIUM mmol/L 139   POTASSIUM mmol/L 3.9   CHLORIDE mmol/L 107   CO2 mmol/L 24    BUN mg/dL 11   CREATININE mg/dL 0.73   ANION GAP mmol/L 8   CALCIUM mg/dL 9.1   ALBUMIN g/dL 3.7   TOTAL BILIRUBIN mg/dL 0.46   ALK PHOS U/L 44   ALT U/L 33   AST U/L 26   GLUCOSE RANDOM mg/dL 99                 Results from last 7 days   Lab Units 02/18/24  0106 02/17/24  2050   LACTIC ACID mmol/L 1.7 2.4*       Lines/Drains:  Invasive Devices       Peripheral Intravenous Line  Duration             Peripheral IV 02/17/24 Right Antecubital <1 day                          Imaging: No pertinent imaging reviewed.    Recent Cultures (last 7 days):         Last 24 Hours Medication List:   Current Facility-Administered Medications   Medication Dose Route Frequency Provider Last Rate    acetaminophen  650 mg Oral Q6H PRN Som Ceron, DO      baclofen  20 mg Oral TID RENE MukherjeeNP      cefTRIAXone  2,000 mg Intravenous Q24H Som Ceron, DO      Hbnwj-Niqgk-Qezgvzpe-TenofAF  1 tablet Oral Daily Som Ceron, DO      enoxaparin  60 mg Subcutaneous Q12H LISS Som Ceron, DO      gabapentin  100 mg Oral TID Som Ceron, DO      hydrALAZINE  10 mg Intravenous Q6H PRN Som Ceron, DO      HYDROmorphone  0.2 mg Intravenous Q4H PRN Som Ceron, DO      hydrOXYzine HCL  50 mg Oral Q6H PRN RENE MukherjeeNP      labetalol  10 mg Intravenous Q6H PRN Som Ceron, DO      multi-electrolyte  75 mL/hr Intravenous Continuous Cristy Anaya DO 75 mL/hr (02/18/24 0923)    oxyCODONE  2.5 mg Oral Q4H PRN Som Ceron,       Or    oxyCODONE  5 mg Oral Q4H PRN Som Ceron, DO      vancomycin  1,500 mg Intravenous Q12H Cristy Anaya DO          Today, Patient Was Seen By: Cristy Anaya DO    **Please Note: This note may have been constructed using a voice recognition system.**

## 2024-02-18 NOTE — RESPIRATORY THERAPY NOTE
02/17/24 2342   Respiratory Assessment   Resp Comments cpap set up at bedside, pt will call when ready to wear   O2 Device v30 Wheezy   Non-Invasive Information   O2 Interface Device Face mask  (medium)   Non-Invasive Ventilation Mode CPAP   Non-Invasive Settings   FiO2 (%)   (1L o2 bleed in, pt currently on 1L o2 nc)   PEEP/CPAP (cm H2O)   (auto 8-18 cmH2o)   Rise Time 2   Humidification   (n/a dry circuit)     Pt states she used to wear cpap at home but has since lost parts, states she is scheduled to undergo another sleep study, she does not know her NIV setting, auto cpap 8-18 cmH2o will be used, pt requests face mask

## 2024-02-18 NOTE — ASSESSMENT & PLAN NOTE
PT/OT Eval and treat  Trend Mobility Scores  Encourage appropriate mobility to achieve and improve upon HLM Goals as noted

## 2024-02-18 NOTE — RESPIRATORY THERAPY NOTE
02/18/24 0316   Respiratory Assessment   Resp Comments placed pt on cpap for HS use   O2 Device v30 Wheezy   Non-Invasive Information   O2 Interface Device Face mask   Non-Invasive Ventilation Mode (S)  BiPAP   $ Intermittent NIV Yes   SpO2 97 %   $ Pulse Oximetry Spot Check Charge Completed   Non-Invasive Settings   FiO2 (%)   (1L o2 bleed in)   Rise Time 2   Humidification   (n/a)   IPAP (cm)   (auto max 20 cmH2o)   EPAP (cm)   (auto minimum 8 cmH2o)   Non-Invasive Readings   Skin Intervention Skin intact   Total Rate 18   Spontaneous Vt (mL) 1162   Spontaneous MV (mL) 21.3   Auto PEEP Observed (cm H2O) 13   I/E Ratio (Obs) 1:2.2   Leak (lpm) 21   Non-Invasive Alarms   Insp Pressure Low (cm H20) 60   MV Low (L/min) 2   High Resp Rate (BPM) 40 BPM   Apnea Interval (sec) 30

## 2024-02-18 NOTE — PLAN OF CARE
Problem: PAIN - ADULT  Goal: Verbalizes/displays adequate comfort level or baseline comfort level  Description: Interventions:  - Encourage patient to monitor pain and request assistance  - Assess pain using appropriate pain scale  - Administer analgesics based on type and severity of pain and evaluate response  - Implement non-pharmacological measures as appropriate and evaluate response  - Consider cultural and social influences on pain and pain management  - Notify physician/advanced practitioner if interventions unsuccessful or patient reports new pain  Outcome: Progressing     Problem: INFECTION - ADULT  Goal: Absence or prevention of progression during hospitalization  Description: INTERVENTIONS:  - Assess and monitor for signs and symptoms of infection  - Monitor lab/diagnostic results  - Monitor all insertion sites, i.e. indwelling lines, tubes, and drains  - Monitor endotracheal if appropriate and nasal secretions for changes in amount and color  - Pryor appropriate cooling/warming therapies per order  - Administer medications as ordered  - Instruct and encourage patient and family to use good hand hygiene technique  - Identify and instruct in appropriate isolation precautions for identified infection/condition  Outcome: Progressing  Goal: Absence of fever/infection during neutropenic period  Description: INTERVENTIONS:  - Monitor WBC    Outcome: Progressing     Problem: SAFETY ADULT  Goal: Patient will remain free of falls  Description: INTERVENTIONS:  - Educate patient/family on patient safety including physical limitations  - Instruct patient to call for assistance with activity   - Consult OT/PT to assist with strengthening/mobility   - Keep Call bell within reach  - Keep bed low and locked with side rails adjusted as appropriate  - Keep care items and personal belongings within reach  - Initiate and maintain comfort rounds  - Make Fall Risk Sign visible to staff  - Apply yellow socks and bracelet  for high fall risk patients  - Consider moving patient to room near nurses station  Outcome: Progressing  Goal: Maintain or return to baseline ADL function  Description: INTERVENTIONS:  -  Assess patient's ability to carry out ADLs; assess patient's baseline for ADL function and identify physical deficits which impact ability to perform ADLs (bathing, care of mouth/teeth, toileting, grooming, dressing, etc.)  - Assess/evaluate cause of self-care deficits   - Assess range of motion  - Assess patient's mobility; develop plan if impaired  - Assess patient's need for assistive devices and provide as appropriate  - Encourage maximum independence but intervene and supervise when necessary  - Involve family in performance of ADLs  - Assess for home care needs following discharge   - Consider OT consult to assist with ADL evaluation and planning for discharge  - Provide patient education as appropriate  Outcome: Progressing  Goal: Maintains/Returns to pre admission functional level  Description: INTERVENTIONS:  - Perform AM-PAC 6 Click Basic Mobility/ Daily Activity assessment daily.  - Set and communicate daily mobility goal to care team and patient/family/caregiver.   - Collaborate with rehabilitation services on mobility goals if consulted  - Out of bed for toileting  - Record patient progress and toleration of activity level   Outcome: Progressing     Problem: RESPIRATORY - ADULT  Goal: Achieves optimal ventilation and oxygenation  Description: INTERVENTIONS:  - Assess for changes in respiratory status  - Assess for changes in mentation and behavior  - Position to facilitate oxygenation and minimize respiratory effort  - Oxygen administered by appropriate delivery if ordered  - Initiate smoking cessation education as indicated  - Encourage broncho-pulmonary hygiene including cough, deep breathe, Incentive Spirometry  - Assess the need for suctioning and aspirate as needed  - Assess and instruct to report SOB or any  respiratory difficulty  - Respiratory Therapy support as indicated  Outcome: Progressing

## 2024-02-18 NOTE — ED PROVIDER NOTES
History  Chief Complaint   Patient presents with    Leg Pain     Pt arrived via ems with c/o a blister that was on her rle and it popped a couple of days ago. Pt was treating her leg with OTC meds. Pt states it is now red and getting worse.      This is a 53-year-old female who suffers from unhealthy weight.  Presenting here with a chief complaint of a blister that popped over her right lower extremity seems to be infected.  She has a history of right lower extremity infection in the past that resulted in debridement and grafting.  She denies any history of diabetes but does have a history of HIV.  Currently denying any fever or chills.      Leg Pain  Associated symptoms: no back pain, no fatigue and no fever        Prior to Admission Medications   Prescriptions Last Dose Informant Patient Reported? Taking?   Btgfs-Dgtmv-Haanckox-TenofAF (Symtuza) [id number] MG TABS   Yes No   Sig: Take 1 tablet by mouth daily   baclofen 20 mg tablet   Yes No   Sig: Take 20 mg by mouth Three times a day   gabapentin (NEURONTIN) 300 mg capsule   Yes No   Sig: Take 300 mg by mouth Three times a day      Facility-Administered Medications: None       Past Medical History:   Diagnosis Date    HIV (human immunodeficiency virus infection) ([facility])        History reviewed. No pertinent surgical history.    History reviewed. No pertinent family history.  I have reviewed and agree with the history as documented.    E-Cigarette/Vaping     E-Cigarette/Vaping Substances     Social History     Tobacco Use    Smoking status: Never    Smokeless tobacco: Never   Substance Use Topics    Alcohol use: Never    Drug use: Never       Review of Systems   Constitutional:  Negative for diaphoresis, fatigue and fever.   HENT:  Negative for congestion, ear pain, nosebleeds and sore throat.    Eyes:  Negative for photophobia, pain, discharge and visual disturbance.   Respiratory:  Negative for cough, choking, chest tightness, shortness of breath and wheezing.     Cardiovascular:  Negative for chest pain and palpitations.   Gastrointestinal:  Negative for abdominal distention, abdominal pain, diarrhea and vomiting.   Genitourinary:  Negative for dysuria, flank pain and frequency.   Musculoskeletal:  Negative for back pain, gait problem and joint swelling.   Skin:  Positive for rash and wound. Negative for color change.   Neurological:  Negative for dizziness, syncope and headaches.   Psychiatric/Behavioral:  Negative for behavioral problems and confusion. The patient is not nervous/anxious.    All other systems reviewed and are negative.      Physical Exam  Physical Exam  Vitals and nursing note reviewed.   Constitutional:       General: She is not in acute distress.     Appearance: She is well-developed. She is not ill-appearing or toxic-appearing.   HENT:      Head: Normocephalic and atraumatic.      Nose: No rhinorrhea.      Mouth/Throat:      Mouth: Mucous membranes are moist.      Dentition: Normal dentition.   Eyes:      General:         Right eye: No discharge.         Left eye: No discharge.   Cardiovascular:      Rate and Rhythm: Normal rate and regular rhythm.   Pulmonary:      Effort: Pulmonary effort is normal. No accessory muscle usage or respiratory distress.   Abdominal:      General: There is no distension.      Tenderness: There is no guarding.   Musculoskeletal:         General: Normal range of motion.      Cervical back: Normal range of motion and neck supple. No rigidity.   Skin:     General: Skin is warm and dry.      Comments: Right lower extremity cellulitis   Neurological:      Mental Status: She is alert and oriented to person, place, and time.      Coordination: Coordination normal.   Psychiatric:         Behavior: Behavior is cooperative.         Vital Signs  ED Triage Vitals   Temperature Pulse Respirations Blood Pressure SpO2   02/17/24 1948 02/17/24 1948 02/17/24 1948 02/17/24 1948 02/17/24 1948   (!) 97.2 °F (36.2 °C) 94 20 (!) 174/76 93 %       Temp Source Heart Rate Source Patient Position - Orthostatic VS BP Location FiO2 (%)   02/17/24 1948 02/17/24 1948 02/17/24 1948 02/17/24 1948 --   Oral Monitor Sitting Right arm       Pain Score       02/17/24 2100       9           Vitals:    02/18/24 0340 02/18/24 0402 02/18/24 0402 02/18/24 0744   BP: 163/80 154/80 154/80 147/83   Pulse: 85 79 79 83   Patient Position - Orthostatic VS:  Sitting           Visual Acuity      ED Medications  Medications   acetaminophen (TYLENOL) tablet 650 mg (has no administration in time range)   gabapentin (NEURONTIN) capsule 100 mg (100 mg Oral Given 2/18/24 0809)   oxyCODONE (ROXICODONE) split tablet 2.5 mg ( Oral See Alternative 2/18/24 0404)     Or   oxyCODONE (ROXICODONE) IR tablet 5 mg (5 mg Oral Given 2/18/24 0404)   HYDROmorphone HCl (DILAUDID) injection 0.2 mg (0.2 mg Intravenous Given 2/18/24 0939)   cefTRIAXone (ROCEPHIN) IVPB (premix in dextrose) 2,000 mg 50 mL (has no administration in time range)   hydrALAZINE (APRESOLINE) injection 10 mg (has no administration in time range)   labetalol (NORMODYNE) injection 10 mg (has no administration in time range)   enoxaparin (LOVENOX) subcutaneous injection 60 mg (60 mg Subcutaneous Given 2/18/24 0809)   Urwry-Cfpvt-Yxhsvyki-TenofAF 045-184-665-10 MG TABS 1 tablet (0 mL Oral Hold 2/18/24 0822)   baclofen tablet 20 mg (20 mg Oral Given 2/18/24 0809)   hydrOXYzine HCL (ATARAX) tablet 50 mg (50 mg Oral Given 2/18/24 1111)   multi-electrolyte (PLASMALYTE-A/ISOLYTE-S PH 7.4) IV solution (75 mL/hr Intravenous New Bag 2/18/24 0923)   vancomycin (VANCOCIN) 1500 mg in sodium chloride 0.9% 250 mL IVPB (has no administration in time range)   multi-electrolyte (ISOLYTE-S PH 7.4) bolus 1,000 mL (1,000 mL Intravenous New Bag 2/17/24 2149)   morphine injection 4 mg (4 mg Intravenous Given 2/17/24 2100)   cefTRIAXone (ROCEPHIN) IVPB (premix in dextrose) 1,000 mg 50 mL (0 mg Intravenous Stopped 2/17/24 2148)   vancomycin (VANCOCIN) 2,000  mg in sodium chloride 0.9 % 500 mL IVPB ( Intravenous Restarted 2/17/24 2317)   multi-electrolyte (PLASMALYTE-A/ISOLYTE-S PH 7.4) IV solution 1,000 mL (1,000 mL Intravenous New Bag 2/17/24 2346)     Followed by   multi-electrolyte (PLASMALYTE-A/ISOLYTE-S PH 7.4) IV solution 1,000 mL (1,000 mL Intravenous New Bag 2/18/24 0100)   iohexol (OMNIPAQUE) 350 MG/ML injection (MULTI-DOSE) 100 mL (100 mL Intravenous Given 2/18/24 1245)       Diagnostic Studies  Results Reviewed       Procedure Component Value Units Date/Time    Comprehensive metabolic panel [[id number]] Collected: 02/18/24 0550    Lab Status: Final result Specimen: Blood from Hand, Right Updated: 02/18/24 0619     Sodium 139 mmol/L      Potassium 3.9 mmol/L      Chloride 107 mmol/L      CO2 24 mmol/L      ANION GAP 8 mmol/L      BUN 11 mg/dL      Creatinine 0.73 mg/dL      Glucose 99 mg/dL      Calcium 9.1 mg/dL      AST 26 U/L      ALT 33 U/L      Alkaline Phosphatase 44 U/L      Total Protein 6.8 g/dL      Albumin 3.7 g/dL      Total Bilirubin 0.46 mg/dL      eGFR 94 ml/min/1.73sq m     Narrative:      National Kid[employer] Foundation guidelines for Chronic Kidney Disease (CKD):     Stage 1 with normal or high GFR (GFR > 90 mL/min/1.73 square meters)    Stage 2 Mild CKD (GFR = 60-89 mL/min/1.73 square meters)    Stage 3A Moderate CKD (GFR = 45-59 mL/min/1.73 square meters)    Stage 3B Moderate CKD (GFR = 30-44 mL/min/1.73 square meters)    Stage 4 Severe CKD (GFR = 15-29 mL/min/1.73 square meters)    Stage 5 End Stage CKD (GFR <15 mL/min/1.73 square meters)  Note: GFR calculation is accurate only with a steady state creatinine    Magnesium [[id number]]  (Normal) Collected: 02/18/24 0550    Lab Status: Final result Specimen: Blood from Hand, Right Updated: 02/18/24 0619     Magnesium 2.0 mg/dL     CBC and differential [[id number]]  (Abnormal) Collected: 02/18/24 0550    Lab Status: Final result Specimen: Blood from Hand, Right Updated: 02/18/24 0603     WBC  4.96 Thousand/uL      RBC 4.37 Million/uL      Hemoglobin 10.6 g/dL      Hematocrit 36.8 %      MCV 84 fL      MCH 24.3 pg      MCHC 28.8 g/dL      RDW 18.4 %      MPV 9.3 fL      Platelets 255 Thousands/uL      nRBC 0 /100 WBCs      Neutrophils Relative 49 %      Immat GRANS % 0 %      Lymphocytes Relative 34 %      Monocytes Relative 11 %      Eosinophils Relative 5 %      Basophils Relative 1 %      Neutrophils Absolute 2.50 Thousands/µL      Immature Grans Absolute 0.02 Thousand/uL      Lymphocytes Absolute 1.66 Thousands/µL      Monocytes Absolute 0.52 Thousand/µL      Eosinophils Absolute 0.23 Thousand/µL      Basophils Absolute 0.03 Thousands/µL     Lactic acid 2 Hours [[id number]]  (Normal) Collected: 02/18/24 0106    Lab Status: Final result Specimen: Blood from Arm, Left Updated: 02/18/24 0208     LACTIC ACID 1.7 mmol/L     Narrative:      Result may be elevated if tourniquet was used during collection.    Platelet count [[id number]]  (Abnormal) Collected: 02/18/24 0026    Lab Status: Final result Specimen: Blood from Arm, Left Updated: 02/18/24 0148     Platelets 104 Thousands/uL     MRSA culture [[id number]]     Lab Status: No result Specimen: Nares     Wound culture and Gram stain [[id number]]     Lab Status: No result Specimen: Wound     Lactic acid, plasma (w/reflex if result > 2.0) [[id number]]  (Abnormal) Collected: 02/17/24 2050    Lab Status: Final result Specimen: Blood from Arm, Right Updated: 02/17/24 2138     LACTIC ACID 2.4 mmol/L     Narrative:      Result may be elevated if tourniquet was used during collection.    Comprehensive metabolic panel [[id number]] Collected: 02/17/24 2050    Lab Status: Final result Specimen: Blood from Arm, Right Updated: 02/17/24 2129     Sodium 141 mmol/L      Potassium 3.8 mmol/L      Chloride 106 mmol/L      CO2 26 mmol/L      ANION GAP 9 mmol/L      BUN 12 mg/dL      Creatinine 0.82 mg/dL      Glucose 105 mg/dL      Calcium 9.6 mg/dL      AST 29 U/L      ALT 38  U/L      Alkaline Phosphatase 46 U/L      Total Protein 7.0 g/dL      Albumin 3.9 g/dL      Total Bilirubin 0.45 mg/dL      eGFR 81 ml/min/1.73sq m     Narrative:      National Kidney Disease Foundation guidelines for Chronic Kidney Disease (CKD):     Stage 1 with normal or high GFR (GFR > 90 mL/min/1.73 square meters)    Stage 2 Mild CKD (GFR = 60-89 mL/min/1.73 square meters)    Stage 3A Moderate CKD (GFR = 45-59 mL/min/1.73 square meters)    Stage 3B Moderate CKD (GFR = 30-44 mL/min/1.73 square meters)    Stage 4 Severe CKD (GFR = 15-29 mL/min/1.73 square meters)    Stage 5 End Stage CKD (GFR <15 mL/min/1.73 square meters)  Note: GFR calculation is accurate only with a steady state creatinine    CBC and differential [[id number]]  (Abnormal) Collected: 02/17/24 2050    Lab Status: Final result Specimen: Blood from Arm, Right Updated: 02/17/24 2112     WBC 4.90 Thousand/uL      RBC 4.47 Million/uL      Hemoglobin 11.0 g/dL      Hematocrit 36.6 %      MCV 82 fL      MCH 24.6 pg      MCHC 30.1 g/dL      RDW 18.4 %      MPV 9.3 fL      Platelets 269 Thousands/uL      nRBC 0 /100 WBCs      Neutrophils Relative 60 %      Immat GRANS % 0 %      Lymphocytes Relative 26 %      Monocytes Relative 9 %      Eosinophils Relative 4 %      Basophils Relative 1 %      Neutrophils Absolute 2.92 Thousands/µL      Immature Grans Absolute 0.02 Thousand/uL      Lymphocytes Absolute 1.29 Thousands/µL      Monocytes Absolute 0.44 Thousand/µL      Eosinophils Absolute 0.19 Thousand/µL      Basophils Absolute 0.04 Thousands/µL     Blood culture #1 [[id number]] Collected: 02/17/24 2059    Lab Status: In process Specimen: Blood from Arm, Right Updated: 02/17/24 2109    Blood culture #2 [[id number]] Collected: 02/17/24 2050    Lab Status: In process Specimen: Blood from Arm, Right Updated: 02/17/24 2109                   VAS VENOUS DUPLEX -LOWER LIMB UNILATERAL    (Results Pending)   CT lower extremity w contrast right    (Results Pending)               Procedures  Procedures         ED Course                               SBIRT 22yo+      Flowsheet Row Most Recent Value   Initial Alcohol Screen: US AUDIT-C     1. How often do you have a drink containing alcohol? 0 Filed at: 02/17/2024 1948   2. How many drinks containing alcohol do you have on a typical day you are drinking?  0 Filed at: 02/17/2024 1948   3b. FEMALE Any Age, or MALE 65+: How often do you have 4 or more drinks on one occassion? 0 Filed at: 02/17/2024 1948   Audit-C Score 0 Filed at: 02/17/2024 1948   ADRIA: How many times in the past year have you...    Used an illegal drug or used a prescription medication for non-medical reasons? Never Filed at: 02/17/2024 1948                      Medical Decision Making  Right lower extremity cellulitis likely from open blister.  History of HIV and history of same side leg infection.  Provided antibiotics here in the emergency department.  Provided intravenous fluids as well for resuscitation.  No evidence of septic shock.    Amount and/or Complexity of Data Reviewed  Labs: ordered.    Risk  Prescription drug management.  Decision regarding hospitalization.             Disposition  Final diagnoses:   Cellulitis of right lower extremity     Time reflects when diagnosis was documented in both MDM as applicable and the Disposition within this note       Time User Action Codes Description Comment    2/17/2024 10:12 PM Paul Marrero Add [L03.115] Cellulitis of right lower extremity           ED Disposition       ED Disposition   Admit    Condition   Stable    Date/Time   Sat Feb 17, 2024 2212    Comment   Case was discussed with Som and the patient's admission status was agreed to be Admission Status: inpatient status to the service of Dr. Kearns .               Follow-up Information    None         Current Discharge Medication List        CONTINUE these medications which have NOT CHANGED    Details   baclofen 20 mg tablet Take 20 mg by mouth Three times a  day      Hpopy-Whydb-Hbthypqq-TenofAF (Symtuza) [id number] MG TABS Take 1 tablet by mouth daily      gabapentin (NEURONTIN) 300 mg capsule Take 300 mg by mouth Three times a day             No discharge procedures on file.    PDMP Review         Value Time User    PDMP Reviewed  Yes 2/17/2024 11:50 PM LUC Mukherjee            ED Provider  Electronically Signed by             LUC Rae  02/18/24 [id number]

## 2024-02-18 NOTE — H&P
Critical access hospital   H&P  Name: Robyn Lyons I  MRN: [id number]  Unit/Bed#: [facility] I Date of Admission: 2/17/2024   Date of Service: 2/17/2024 I Hospital Day: 0    Acute respiratory failure with hypoxia (HCC)  Assessment & Plan    Currently SpO2: 95 % requiring  2L NC    At baseline, uses no supplemental, but feels she may need it    Plan:  Wean O2 as able  May need Home O2 on discharge      * Cellulitis of right lower extremity  Assessment & Plan  Reportedly had a blister in the right lower extremity that had popped a few days prior    Recent Labs     02/17/24 2050   WBC 4.90     Does not meet SIRS or sepsis criteria at this time  Initiated on IV antibiotics in the ED    Plan:  Venous duplex to rule out DVT  Continue vancomycin for now;  IV Fluid hydration  Follow up Blood clx, wound clx, MRSA  If clinically worsening, will obtain CT with contrast of LE  Wound care consult  PRN pain regimen  Trend fever/WBC curve  PT OT      Obesity hypoventilation syndrome (HCC)  Assessment & Plan  Patient complaining of significant shortness of breath while sleeping  May be attributed to obesity  Trial CPAP HS    Class 3 severe obesity due to excess calories with serious comorbidity and body mass index (BMI) of 60.0 to 69.9 in adult (HCC)  Assessment & Plan  Body mass index is 68.99 kg/m².    Recommend incorporating a more whole foods plant-predominant diet along with decreasing consumption of red meats and processed foods  Per AHA guidelines, recommend moderate-vigorous intensity exercise for 30 minutes a day for 5 days a week or a total of 150 min/week    Ambulatory dysfunction  Assessment & Plan  PT/OT Eval and treat  Trend Mobility Scores  Encourage appropriate mobility to achieve and improve upon HLM Goals as noted    Asymptomatic HIV infection, with no history of HIV-related illness (HCC)  Assessment & Plan  Continue home [facility] [phone]    Anemia  Assessment & Plan  Recent Labs      02/17/24 2050   HGB 11.0*   MCV 82     Plan:  Monitor CBC  Transfuse if Hb < 7      Lactic acidemia  Assessment & Plan  Recent Labs     02/17/24 2050   LACTICACID 2.4*     IV Fluid hydration  Recheck Lactic acid        VTE Pharmacologic Prophylaxis: VTE Score: 5 High Risk (Score >/= 5) - Pharmacological DVT Prophylaxis Ordered: enoxaparin (Lovenox). Sequential Compression Devices Ordered.  Code Status: Level 1 - Full Code   Discussion with Patient/Family: patient    Anticipated Length of Stay: Patient will be admitted on an inpatient basis with an anticipated length of stay of greater than 2 midnights secondary to plan above.    Total Time for Visit, including Counseling / Coordination of Care: 85 minutes Greater than 50% of this total time spent on direct patient counseling and coordination of care.    Chief Complaint:   Chief Complaint   Patient presents with    Leg Pain     Pt arrived via ems with c/o a blister that was on her rle and it popped a couple of days ago. Pt was treating her leg with OTC meds. Pt states it is now red and getting worse.        History of Present Illness:  Robyn Lyons is a 53 y.o. female who presents with worsening lower extremity pain on the right    PMHx of HIV, morbid obesity, prior right lower extremity infection status postdebridement and grafting    She did have a blister in the right lower extremity that had popped a few days prior to presentation.  She was treating the leg with over-the-counter medications but noticed redness and pain had been worsening in severity.  Denies any associated fever, chills, chest pain, abdominal pain.  Does note mild tenderness.    Upon evaluation in the ED, she was initiated on IV antibiotics.  Did not meet SIRS criteria but is being admitted for continued management on IV antibiotics with plans to transition to oral antibiotics when stabilized.      Review of Systems:  A 10-point review of systems was obtained.  Pertinent positives and  "negatives are outlined in the HPI above.  Remainder of review of systems are otherwise negative.     Past Medical and Surgical History:   Past Medical History:   Diagnosis Date    HIV (human immunodeficiency virus infection) (HCC)        History reviewed. No pertinent surgical history.    Meds/Allergies:  Prior to Admission medications    Not on File     I have reviewed home medications with patient personally.    Allergies: No Known Allergies    Social History:  Marital Status: Single   Patient Pre-hospital Living Situation: Home  Patient Pre-hospital Level of Mobility: unable to be assessed at time of evaluation  Patient Pre-hospital Diet Restrictions: none  Substance Use History:   Social History     Substance and Sexual Activity   Alcohol Use Never     Social History     Tobacco Use   Smoking Status Never   Smokeless Tobacco Never     Social History     Substance and Sexual Activity   Drug Use Never       Family History:  History reviewed. No pertinent family history.    Physical Exam:     Vitals:   Blood Pressure: 156/74 (02/17/24 2215)  Pulse: 90 (02/17/24 2215)  Temperature: (!) 97.2 °F (36.2 °C) (02/17/24 1948)  Temp Source: Oral (02/17/24 1948)  Respirations: 18 (02/17/24 2215)  Height: 5' 7\" (170.2 cm) (02/17/24 2148)  Weight - Scale: (!) 200 kg (440 lb 7.7 oz) (02/17/24 2148)  SpO2: 95 % (02/17/24 2215)    Physical Exam  Vitals and nursing note reviewed.   Constitutional:       General: She is not in acute distress.     Appearance: Normal appearance. She is morbidly obese. She is ill-appearing. She is not toxic-appearing.   HENT:      Head: Normocephalic.      Nose: Nose normal.      Mouth/Throat:      Mouth: Mucous membranes are dry.   Eyes:      General: No scleral icterus.     Conjunctiva/sclera: Conjunctivae normal.   Cardiovascular:      Rate and Rhythm: Normal rate.      Pulses: Normal pulses.           Radial pulses are 2+ on the right side and 2+ on the left side.      Heart sounds: Normal heart " "sounds.   Pulmonary:      Effort: Pulmonary effort is normal. No respiratory distress.      Breath sounds: Normal breath sounds.   Abdominal:      General: Bowel sounds are normal. There is no distension.      Palpations: Abdomen is soft.      Tenderness: There is no abdominal tenderness.   Musculoskeletal:         General: Tenderness present.      Right lower leg: Edema present.      Left lower leg: Edema present.   Skin:     General: Skin is dry.      Coloration: Skin is not jaundiced.      Findings: Lesion present.      Comments: Stasis dermatitis on RLE noted  Grafting noted on anterior aspect of RLE    See image for wound   Neurological:      Mental Status: She is alert and oriented to person, place, and time. Mental status is at baseline.   Psychiatric:         Mood and Affect: Mood normal.         Behavior: Behavior normal. Behavior is cooperative.             Additional Data:     Lab Results:  Results from last 7 days   Lab Units 02/17/24 2050   WBC Thousand/uL 4.90   HEMOGLOBIN g/dL 11.0*   HEMATOCRIT % 36.6   PLATELETS Thousands/uL 269   NEUTROS PCT % 60   LYMPHS PCT % 26   MONOS PCT % 9   EOS PCT % 4     Results from last 7 days   Lab Units 02/17/24 2050   SODIUM mmol/L 141   POTASSIUM mmol/L 3.8   CHLORIDE mmol/L 106   CO2 mmol/L 26   BUN mg/dL 12   CREATININE mg/dL 0.82   ANION GAP mmol/L 9   CALCIUM mg/dL 9.6   ALBUMIN g/dL 3.9   TOTAL BILIRUBIN mg/dL 0.45   ALK PHOS U/L 46   ALT U/L 38   AST U/L 29   GLUCOSE RANDOM mg/dL 105                 Results from last 7 days   Lab Units 02/17/24 2050   LACTIC ACID mmol/L 2.4*       Imaging Results Reviewed as noted below  VAS VENOUS DUPLEX -LOWER LIMB UNILATERAL    (Results Pending)       No results found.  No Chest XR results available for this patient.       EKG and Other Studies Reviewed on Admission:   EKG: no EKG on file    No results for input(s): \"VENTRATE\" in the last 72 hours.      ** Please Note: This note has been constructed using a voice " recognition system. **

## 2024-02-18 NOTE — ASSESSMENT & PLAN NOTE
Reportedly had a blister in the right lower extremity that had popped a few days prior    Recent Labs     02/17/24 2050   WBC 4.90     Does not meet SIRS or sepsis criteria at this time  Initiated on IV antibiotics in the ED    Plan:  Venous duplex to rule out DVT  Continue vancomycin for now;  IV Fluid hydration  Follow up Blood clx, wound clx, MRSA  CT LE with contrast pending  Wound care consult  PRN pain regimen  Trend fever/WBC curve  PT OT

## 2024-02-18 NOTE — ASSESSMENT & PLAN NOTE
Body mass index is 68.99 kg/m².    Recommend incorporating a more whole foods plant-predominant diet along with decreasing consumption of red meats and processed foods  Per AHA guidelines, recommend moderate-vigorous intensity exercise for 30 minutes a day for 5 days a week or a total of 150 min/week

## 2024-02-18 NOTE — PROGRESS NOTES
Robyn Lyons is a 53 y.o. female who is currently ordered Vancomycin IV with management by the Pharmacy Consult service.  Relevant clinical data and objective / subjective history reviewed.  Vancomycin Assessment:  Indication and Goal AUC/Trough: Soft tissue (goal -600, trough >10)  Clinical Status: stable  Micro:     Renal Function:  SCr: 0.73 mg/dL  CrCl: 142.3 mL/min  Renal replacement: Not on dialysis  Days of Therapy: 2  Current Dose: 1250mg IV Q12H  Vancomycin Plan:  New Dosinmg IV Q12H  Estimated AUC: 492 mcg*hr/mL  Estimated Trough: 11.9 mcg/mL  Next Level: 24 with AM labs  Renal Function Monitoring: Daily BMP and UOP  Pharmacy will continue to follow closely for s/sx of nephrotoxicity, infusion reactions and appropriateness of therapy.  BMP and CBC will be ordered per protocol. We will continue to follow the patient’s culture results and clinical progress daily.    Sonal Erazo, Pharmacist

## 2024-02-19 ENCOUNTER — APPOINTMENT (INPATIENT)
Dept: VASCULAR ULTRASOUND | Facility: HOSPITAL | Age: 54
DRG: 602 | End: 2024-02-19
Payer: COMMERCIAL

## 2024-02-19 LAB
ANION GAP SERPL CALCULATED.3IONS-SCNC: 6 MMOL/L
BUN SERPL-MCNC: 8 MG/DL (ref 5–25)
CALCIUM SERPL-MCNC: 8.9 MG/DL (ref 8.4–10.2)
CHLORIDE SERPL-SCNC: 108 MMOL/L (ref 96–108)
CO2 SERPL-SCNC: 29 MMOL/L (ref 21–32)
CREAT SERPL-MCNC: 0.82 MG/DL (ref 0.6–1.3)
ERYTHROCYTE [DISTWIDTH] IN BLOOD BY AUTOMATED COUNT: 18.4 % (ref 11.6–15.1)
GFR SERPL CREATININE-BSD FRML MDRD: 81 ML/MIN/1.73SQ M
GLUCOSE SERPL-MCNC: 85 MG/DL (ref 65–140)
HCT VFR BLD AUTO: 34.2 % (ref 34.8–46.1)
HGB BLD-MCNC: 9.9 G/DL (ref 11.5–15.4)
MCH RBC QN AUTO: 24.3 PG (ref 26.8–34.3)
MCHC RBC AUTO-ENTMCNC: 28.9 G/DL (ref 31.4–37.4)
MCV RBC AUTO: 84 FL (ref 82–98)
PLATELET # BLD AUTO: 235 THOUSANDS/UL (ref 149–390)
PMV BLD AUTO: 9.3 FL (ref 8.9–12.7)
POTASSIUM SERPL-SCNC: 4.2 MMOL/L (ref 3.5–5.3)
RBC # BLD AUTO: 4.07 MILLION/UL (ref 3.81–5.12)
SODIUM SERPL-SCNC: 143 MMOL/L (ref 135–147)
VANCOMYCIN SERPL-MCNC: 18.9 UG/ML (ref 10–20)
WBC # BLD AUTO: 3.92 THOUSAND/UL (ref 4.31–10.16)

## 2024-02-19 PROCEDURE — 93971 EXTREMITY STUDY: CPT

## 2024-02-19 PROCEDURE — 93971 EXTREMITY STUDY: CPT | Performed by: SURGERY

## 2024-02-19 PROCEDURE — 80202 ASSAY OF VANCOMYCIN: CPT | Performed by: INTERNAL MEDICINE

## 2024-02-19 PROCEDURE — 99233 SBSQ HOSP IP/OBS HIGH 50: CPT | Performed by: FAMILY MEDICINE

## 2024-02-19 PROCEDURE — 85027 COMPLETE CBC AUTOMATED: CPT | Performed by: STUDENT IN AN ORGANIZED HEALTH CARE EDUCATION/TRAINING PROGRAM

## 2024-02-19 PROCEDURE — 80048 BASIC METABOLIC PNL TOTAL CA: CPT | Performed by: STUDENT IN AN ORGANIZED HEALTH CARE EDUCATION/TRAINING PROGRAM

## 2024-02-19 RX ADMIN — HYDROMORPHONE HYDROCHLORIDE 0.2 MG: 0.2 INJECTION, SOLUTION INTRAMUSCULAR; INTRAVENOUS; SUBCUTANEOUS at 21:52

## 2024-02-19 RX ADMIN — OXYCODONE HYDROCHLORIDE 5 MG: 5 TABLET ORAL at 14:22

## 2024-02-19 RX ADMIN — ENOXAPARIN SODIUM 60 MG: 60 INJECTION SUBCUTANEOUS at 09:41

## 2024-02-19 RX ADMIN — VANCOMYCIN HYDROCHLORIDE 1500 MG: 5 INJECTION, POWDER, LYOPHILIZED, FOR SOLUTION INTRAVENOUS at 11:46

## 2024-02-19 RX ADMIN — ENOXAPARIN SODIUM 60 MG: 60 INJECTION SUBCUTANEOUS at 21:52

## 2024-02-19 RX ADMIN — OXYCODONE HYDROCHLORIDE 5 MG: 5 TABLET ORAL at 23:36

## 2024-02-19 RX ADMIN — SODIUM CHLORIDE, SODIUM GLUCONATE, SODIUM ACETATE, POTASSIUM CHLORIDE, MAGNESIUM CHLORIDE, SODIUM PHOSPHATE, DIBASIC, AND POTASSIUM PHOSPHATE 75 ML/HR: .53; .5; .37; .037; .03; .012; .00082 INJECTION, SOLUTION INTRAVENOUS at 05:28

## 2024-02-19 RX ADMIN — GABAPENTIN 100 MG: 100 CAPSULE ORAL at 21:52

## 2024-02-19 RX ADMIN — OXYCODONE HYDROCHLORIDE 5 MG: 5 TABLET ORAL at 01:56

## 2024-02-19 RX ADMIN — HYDROXYZINE HYDROCHLORIDE 50 MG: 25 TABLET ORAL at 13:51

## 2024-02-19 RX ADMIN — HYDROMORPHONE HYDROCHLORIDE 0.2 MG: 0.2 INJECTION, SOLUTION INTRAMUSCULAR; INTRAVENOUS; SUBCUTANEOUS at 05:22

## 2024-02-19 RX ADMIN — SODIUM CHLORIDE, SODIUM GLUCONATE, SODIUM ACETATE, POTASSIUM CHLORIDE, MAGNESIUM CHLORIDE, SODIUM PHOSPHATE, DIBASIC, AND POTASSIUM PHOSPHATE 75 ML/HR: .53; .5; .37; .037; .03; .012; .00082 INJECTION, SOLUTION INTRAVENOUS at 00:38

## 2024-02-19 RX ADMIN — VANCOMYCIN HYDROCHLORIDE 1500 MG: 5 INJECTION, POWDER, LYOPHILIZED, FOR SOLUTION INTRAVENOUS at 23:29

## 2024-02-19 RX ADMIN — BACLOFEN 20 MG: 10 TABLET ORAL at 21:52

## 2024-02-19 RX ADMIN — HYDROMORPHONE HYDROCHLORIDE 0.2 MG: 0.2 INJECTION, SOLUTION INTRAMUSCULAR; INTRAVENOUS; SUBCUTANEOUS at 16:02

## 2024-02-19 RX ADMIN — DARUNAVIR, COBICISTAT, EMTRICITABINE, AND TENOFOVIR ALAFENAMIDE 1 TABLET: 800; 150; 200; 10 TABLET, FILM COATED ORAL at 09:40

## 2024-02-19 RX ADMIN — OXYCODONE HYDROCHLORIDE 5 MG: 5 TABLET ORAL at 19:34

## 2024-02-19 RX ADMIN — HYDROMORPHONE HYDROCHLORIDE 0.2 MG: 0.2 INJECTION, SOLUTION INTRAMUSCULAR; INTRAVENOUS; SUBCUTANEOUS at 11:38

## 2024-02-19 RX ADMIN — CEFTRIAXONE 2000 MG: 2 INJECTION, SOLUTION INTRAVENOUS at 13:52

## 2024-02-19 RX ADMIN — BACLOFEN 20 MG: 10 TABLET ORAL at 16:02

## 2024-02-19 RX ADMIN — GABAPENTIN 100 MG: 100 CAPSULE ORAL at 09:38

## 2024-02-19 RX ADMIN — BACLOFEN 20 MG: 10 TABLET ORAL at 09:37

## 2024-02-19 RX ADMIN — OXYCODONE HYDROCHLORIDE 5 MG: 5 TABLET ORAL at 09:39

## 2024-02-19 RX ADMIN — GABAPENTIN 100 MG: 100 CAPSULE ORAL at 16:02

## 2024-02-19 NOTE — ASSESSMENT & PLAN NOTE
Body mass index is 69.06 kg/m².    Recommend incorporating a more whole foods plant-predominant diet along with decreasing consumption of red meats and processed foods  Per AHA guidelines, recommend moderate-vigorous intensity exercise for 30 minutes a day for 5 days a week or a total of 150 min/week

## 2024-02-19 NOTE — UTILIZATION REVIEW
Initial Clinical Review    Admission: Date/Time/Statement:   Admission Orders (From admission, onward)       Ordered        02/17/24 2213  Inpatient Admission  Once                          Orders Placed This Encounter   Procedures    Inpatient Admission     Standing Status:   Standing     Number of Occurrences:   1     Order Specific Question:   Level of Care     Answer:   Med Surg [16]     Order Specific Question:   Estimated length of stay     Answer:   More than 2 Midnights     Order Specific Question:   Certification     Answer:   I certify that inpatient services are medically necessary for this patient for a duration of greater than two midnights. See H&P and MD Progress Notes for additional information about the patient's course of treatment.     ED Arrival Information       Expected   -    Arrival   2/17/2024 19:44    Acuity   Urgent              Means of arrival   Ambulance    Escorted by   [facility]   Hospitalist    Admission type   Emergency              Arrival complaint   leg pain/redness             Chief Complaint   Patient presents with    Leg Pain     Pt arrived via ems with c/o a blister that was on her rle and it popped a couple of days ago. Pt was treating her leg with OTC meds. Pt states it is now red and getting worse.        Initial Presentation: 53 y.o. female to ED from home via EMS  w/ worsening LE pain On R . PMHx of HIV, morbid obesity, prior right lower extremity infection status postdebridement and grafting . Had a  blister in the right lower extremity that had popped a few days prior to presentation.  She was treating the leg with over-the-counter medications but noticed redness and pain had been worsening in severity. In ED started on IV abx . Venous duplex to t/o DVT . Admitted IP status w/ cellulitis plan for IVF , IV vanco , f/u BC , wound cx , MRSA , wound care consult , pain regimen , trend cbc , wbc , PT OT eval . O2 95 % on 2l wean as able . May need O2 on  DC . Amb dysfunction PT OT eval . HIV cont symtuza . Anemia monitor cbc . Lactic acidemia IVF and recheck .               Date: 2/18   Day 2: cont IV abx and monitoring of cellulitis . + erythema .  R LE  chronic scar, venostasis changes with erythema, edema, warmth. Demarcarted with marker.   PT OT eval . Venous duplex pending , IVF .     Date: 2/19    Day 3: Has surpassed a 2nd midnight with active treatments and services, which include cont IV abx  , f/u venous duplex, IVF.      ED Triage Vitals   Temperature Pulse Respirations Blood Pressure SpO2   02/17/24 1948 02/17/24 1948 02/17/24 1948 02/17/24 1948 02/17/24 1948   (!) 97.2 °F (36.2 °C) 94 20 (!) 174/76 93 %      Temp Source Heart Rate Source Patient Position - Orthostatic VS BP Location FiO2 (%)   02/17/24 1948 02/17/24 1948 02/17/24 1948 02/17/24 1948 --   Oral Monitor Sitting Right arm       Pain Score       02/17/24 2100       9          Wt Readings from Last 1 Encounters:   02/17/24 (!) 200 kg (440 lb 14.7 oz)     Additional Vital Signs:                 02/19/24 0930 -- -- -- -- -- 93 % -- -- None (Room air) -- --   02/19/24 08:06:37 98.1 °F (36.7 °C) 89 17 144/78 100 94 % -- -- -- -- --   02/18/24 22:23:15 98.6 °F (37 °C) 90 -- 143/81 102 95 % -- -- -- -- --   02/18/24 2124 -- -- -- -- -- 96 % -- -- -- -- --   02/18/24 16:00:05 98.6 °F (37 °C) 87 -- 146/82 103 94 % -- -- -- -- --   02/18/24 0900 -- -- -- -- -- 96 % -- -- None (Room air) -- --   02/18/24 07:44:41 97.6 °F (36.4 °C) 83 -- 147/83 104 97 % -- -- -- -- --   02/18/24 04:02:01 -- 79 -- 154/80 105 97 % -- -- -- -- --   02/18/24 0402 -- 79 18 154/80  105 -- -- -- -- -- Sitting   BP: post bolus at 02/18/24 0402   02/18/24 03:40:19 -- 85 -- 163/80 108 94 % -- -- -- -- --   02/18/24 0340 -- 83 18 163/80  108 -- -- -- -- -- Sitting   BP: post bolus at 02/18/24 0340   02/18/24 03:20:16 -- 78 18 134/70  91 98 % -- -- -- -- Sitting   BP: Post bolus at 02/18/24 0320   02/18/24 0316 -- -- -- -- -- 97 %  -- -- -- Face mask --   02/18/24 03:04:08 -- 83 18 138/71  93 97 % -- -- -- -- Sitting   BP: Post bolus at 02/18/24 0304   02/17/24 2351 98.8 °F (37.1 °C) 84 18 145/70 95 -- -- -- -- -- Lying   02/17/24 2342 -- -- -- -- -- -- -- -- -- Face mask  --   O2 Interface Device: medium at 02/17/24 2342 02/17/24 2319 -- -- -- -- -- 92 % 24 1 L/min Nasal cannula  -- --   O2 Device: acute at 02/17/24 2319 02/17/24 23:18:55 98.8 °F (37.1 °C) 89 -- 145/70 95 92 % -- -- -- -- --   02/17/24 2215 -- 90 18 156/74 104 95 % -- -- None (Room air)         Pertinent Labs/Diagnostic Test Results:   CT lower extremity w contrast right   Final Result by Brett North MD (02/19 1041)      Soft tissue defect/ulceration anteriorly in the mid lower leg. Extensive circumferential subcutaneous edema and skin thickening throughout the lower leg extending into the ankle and dorsum of the foot. This may reflect cellulitis. No discrete abscess or    soft tissue gas.      Chronic appearing periosteal reaction anteriorly in the tibial shaft. Moderately advanced tricompartmental osteoarthrosis of the knee. No acute osseous abnormality or osseous erosion.               Workstation performed: [id number]         VAS VENOUS DUPLEX -LOWER LIMB UNILATERAL    (Results Pending)         Results from last 7 days   Lab Units 02/19/24  0524 02/18/24  0550 02/18/24  0026 02/17/24 2050   WBC Thousand/uL 3.92* 4.96  --  4.90   HEMOGLOBIN g/dL 9.9* 10.6*  --  11.0*   HEMATOCRIT % 34.2* 36.8  --  36.6   PLATELETS Thousands/uL 235 255 104* 269   NEUTROS ABS Thousands/µL  --  2.50  --  2.92         Results from last 7 days   Lab Units 02/19/24  0524 02/18/24  0550 02/17/24 2050   SODIUM mmol/L 143 139 141   POTASSIUM mmol/L 4.2 3.9 3.8   CHLORIDE mmol/L 108 107 106   CO2 mmol/L 29 24 26   ANION GAP mmol/L 6 8 9   BUN mg/dL 8 11 12   CREATININE mg/dL 0.82 0.73 0.82   EGFR ml/min/1.73sq m 81 94 81   CALCIUM mg/dL 8.9 9.1 9.6   MAGNESIUM mg/dL  --  2.0  --       Results from last 7 days   Lab Units 02/18/24  0550 02/17/24 2050   AST U/L 26 29   ALT U/L 33 38   ALK PHOS U/L 44 46   TOTAL PROTEIN g/dL 6.8 7.0   ALBUMIN g/dL 3.7 3.9   TOTAL BILIRUBIN mg/dL 0.46 0.45         Results from last 7 days   Lab Units 02/19/24  0524 02/18/24  0550 02/17/24 2050   GLUCOSE RANDOM mg/dL 85 99 105     Results from last 7 days   Lab Units 02/18/24  0106 02/17/24 2050   LACTIC ACID mmol/L 1.7 2.4*         Results from last 7 days   Lab Units 02/17/24 2059 02/17/24 2050   BLOOD CULTURE  Received in Microbiology Lab. Culture in Progress. Received in Microbiology Lab. Culture in Progress.     Results from last 7 days   Lab Units 02/19/24 0524   VANCOMYCIN RM ug/mL 18.9       ED Treatment:   Medication Administration from 02/17/2024 1944 to 02/17/2024 2309         Date/Time Order Dose Route Action     02/17/2024 2149 EST multi-electrolyte (ISOLYTE-S PH 7.4) bolus 1,000 mL 1,000 mL Intravenous New Bag     02/17/2024 2100 EST morphine injection 4 mg 4 mg Intravenous Given     02/17/2024 2103 EST cefTRIAXone (ROCEPHIN) IVPB (premix in dextrose) 1,000 mg 50 mL 1,000 mg Intravenous New Bag     02/17/2024 2205 EST vancomycin (VANCOCIN) 2,000 mg in sodium chloride 0.9 % 500 mL IVPB 2,000 mg Intravenous New Bag          Past Medical History:   Diagnosis Date    HIV (human immunodeficiency virus infection) (HCC)      Present on Admission:  **None**      Admitting Diagnosis: Leg pain [M79.606]  Cellulitis of right lower extremity [L03.115]  Age/Sex: 53 y.o. female  Admission Orders:  Scheduled Medications:  baclofen, 20 mg, Oral, TID  cefTRIAXone, 2,000 mg, Intravenous, Q24H  Aatpy-Rvifp-Zelmjbat-TenofAF, 1 tablet, Oral, Daily  enoxaparin, 60 mg, Subcutaneous, Q12H LISS  gabapentin, 100 mg, Oral, TID  vancomycin, 1,500 mg, Intravenous, Q12H      Continuous IV Infusions:     PRN Meds:  acetaminophen, 650 mg, Oral, Q6H PRN  hydrALAZINE, 10 mg, Intravenous, Q6H PRN HYDROmorphone, 0.2 mg,  Intravenous, Q4H PRN  2/18 x5 2/19 x1  hydrOXYzine HCL, 50 mg, Oral, Q6H PRN  labetalol, 10 mg, Intravenous, Q6H PRN  oxyCODONE, 2.5 mg, Oral, Q4H PRN   Or  oxyCODONE, 5 mg, Oral, Q4H PRN    PT OT eval   Reg diet   Up and OOB     IP CONSULT TO PHARMACY    Network Utilization Review Department  ATTENTION: Please call with any questions or concerns to [phone] and carefully listen to the prompts so that you are directed to the right person. All voicemails are confidential.   For Discharge needs, contact Care Management DC Support Team at [phone] opt. [phone]  Send all requests for admission clinical reviews, approved or denied determinations and any other requests to dedicated fax number below belonging to the [facility] where the patient is receiving treatment. List of dedicated fax numbers for the Facilities:  FACILITY NAME UR FAX NUMBER   ADMISSION DENIALS (Administrative/Medical Necessity) [phone]   DISCHARGE SUPPORT TEAM (NETWORK) [phone]   PARENT CHILD HEALTH (Maternity/NICU/Pediatrics) [phone]   [facility] [phone]   [facility] [phone]   [facility] [phone]   [facility] [phone]   [facility] [phone]   [facility] [phone]   [facility] [phone]   [facility] [phone]   [facility] [phone]   [facility] [phone]   [facility] [phone]   [facility] [phone]

## 2024-02-19 NOTE — CASE MANAGEMENT
Case Management Discharge Planning Note    Patient name Robyn Lyons  Location /-01 MRN [id number]  : 1970 Date 2024       Current Admission Date: 2024  Current Admission Diagnosis:Cellulitis of right lower extremity   Patient Active Problem List    Diagnosis Date Noted    Cellulitis of right lower extremity 2024    Class 3 severe obesity due to excess calories with serious comorbidity and body mass index (BMI) of 60.0 to 69.9 in adult (HCC) 2024    Anemia 2024    Acute respiratory failure with hypoxia (HCC) 2024    Asymptomatic HIV infection, with no history of HIV-related illness (HCC) 2024    Obesity hypoventilation syndrome (HCC) 2024    Ambulatory dysfunction 2024      LOS (days): 2  Geometric Mean LOS (GMLOS) (days):   Days to GMLOS:     OBJECTIVE:  Risk of Unplanned Readmission Score: 8.12         Current admission status: Inpatient   Preferred Pharmacy:   [email or web address] - [address]  [address]  [address]  [place] [address]  Phone: [phone] Fax: [phone]    Primary Care Provider: No primary care provider on file.    Primary Insurance: [facility] DUAL COMPLETE  REP  Secondary Insurance: [facility]    DISCHARGE DETAILS:        IMM Given (Date):: 24  IMM Given to:: Patient   CM reviewed IMM with patient at bedside. Patient reported understanding their rights and denied any questions or concerns Patient was given a copy and signed copy was placed in medical records.

## 2024-02-19 NOTE — ASSESSMENT & PLAN NOTE
Currently SpO2: 93 % requiring Nasal Cannula O2 Flow Rate (L/min): 1 L/min2L NC    Cont to supplement NC to keep sats >90%  Could be secondary to ANA MARIA, will need OP sleep study

## 2024-02-19 NOTE — PLAN OF CARE
Problem: PAIN - ADULT  Goal: Verbalizes/displays adequate comfort level or baseline comfort level  Description: Interventions:  - Encourage patient to monitor pain and request assistance  - Assess pain using appropriate pain scale  - Administer analgesics based on type and severity of pain and evaluate response  - Implement non-pharmacological measures as appropriate and evaluate response  - Consider cultural and social influences on pain and pain management  - Notify physician/advanced practitioner if interventions unsuccessful or patient reports new pain  Outcome: Progressing     Problem: INFECTION - ADULT  Goal: Absence or prevention of progression during hospitalization  Description: INTERVENTIONS:  - Assess and monitor for signs and symptoms of infection  - Monitor lab/diagnostic results  - Monitor all insertion sites, i.e. indwelling lines, tubes, and drains  - Monitor endotracheal if appropriate and nasal secretions for changes in amount and color  - [place] appropriate cooling/warming therapies per order  - Administer medications as ordered  - Instruct and encourage patient and family to use good hand hygiene technique  - Identify and instruct in appropriate isolation precautions for identified infection/condition  Outcome: Progressing  Goal: Absence of fever/infection during neutropenic period  Description: INTERVENTIONS:  - Monitor WBC    Outcome: Progressing     Problem: SAFETY ADULT  Goal: Patient will remain free of falls  Description: INTERVENTIONS:  - Educate patient/family on patient safety including physical limitations  - Instruct patient to call for assistance with activity   - Consult OT/PT to assist with strengthening/mobility   - Keep Call bell within reach  - Keep bed low and locked with side rails adjusted as appropriate  - Keep care items and personal belongings within reach  - Initiate and maintain comfort rounds  - Make Fall Risk Sign visible to staff  - Apply yellow socks and bracelet  for high fall risk patients  - Consider moving patient to room near nurses station  Outcome: Progressing  Goal: Maintain or return to baseline ADL function  Description: INTERVENTIONS:  -  Assess patient's ability to carry out ADLs; assess patient's baseline for ADL function and identify physical deficits which impact ability to perform ADLs (bathing, care of mouth/teeth, toileting, grooming, dressing, etc.)  - Assess/evaluate cause of self-care deficits   - Assess range of motion  - Assess patient's mobility; develop plan if impaired  - Assess patient's need for assistive devices and provide as appropriate  - Encourage maximum independence but intervene and supervise when necessary  - Involve family in performance of ADLs  - Assess for home care needs following discharge   - Consider OT consult to assist with ADL evaluation and planning for discharge  - Provide patient education as appropriate  Outcome: Progressing  Goal: Maintains/Returns to pre admission functional level  Description: INTERVENTIONS:  - Perform AM-PAC 6 Click Basic Mobility/ Daily Activity assessment daily.  - Set and communicate daily mobility goal to care team and patient/family/caregiver.   - Collaborate with rehabilitation services on mobility goals if consulted  - Out of bed for toileting  - Record patient progress and toleration of activity level   Outcome: Progressing     Problem: RESPIRATORY - ADULT  Goal: Achieves optimal ventilation and oxygenation  Description: INTERVENTIONS:  - Assess for changes in respiratory status  - Assess for changes in mentation and behavior  - Position to facilitate oxygenation and minimize respiratory effort  - Oxygen administered by appropriate delivery if ordered  - Initiate smoking cessation education as indicated  - Encourage broncho-pulmonary hygiene including cough, deep breathe, Incentive Spirometry  - Assess the need for suctioning and aspirate as needed  - Assess and instruct to report SOB or any  respiratory difficulty  - Respiratory Therapy support as indicated  Outcome: Progressing     Problem: Prexisting or High Potential for Compromised Skin Integrity  Goal: Skin integrity is maintained or improved  Description: INTERVENTIONS:  - Identify patients at risk for skin breakdown  - Assess and monitor skin integrity  - Assess and monitor nutrition and hydration status  - Monitor labs   - Assess for incontinence   - Turn and reposition patient  - Assist with mobility/ambulation  - Relieve pressure over bony prominences  - Avoid friction and shearing  - Provide appropriate hygiene as needed including keeping skin clean and dry  - Evaluate need for skin moisturizer/barrier cream  - Collaborate with interdisciplinary team   - Patient/family teaching  - Consider wound care consult   Outcome: Progressing

## 2024-02-19 NOTE — PROGRESS NOTES
[facility]  Progress Note  Name: Robyn Lyons I  MRN: [id number]  Unit/Bed#: -01 I Date of Admission: 2/17/2024   Date of Service: 2/19/2024 I Hospital Day: 2    Assessment/Plan   * Cellulitis of right lower extremity  Assessment & Plan  Reportedly had a blister in the right lower extremity that had popped a few days prior spontaneously  Does not meet SIRS or sepsis criteria at this time  Continue ceftriaxone and vancomycin as there is purulence coming from one of the lesion  CT scan results reviewed - consistent with cellulitis  Follow blood cultures  Cont wound care      Ambulatory dysfunction  Assessment & Plan  PT/OT Eval and treat  Trend Mobility Scores  Encourage appropriate mobility to achieve and improve upon HLM Goals as noted    Obesity hypoventilation syndrome (HCC)  Assessment & Plan  Patient complaining of significant shortness of breath while sleeping  May be attributed to obesity  Trial CPAP HS    Acute respiratory failure with hypoxia (HCC)  Assessment & Plan    Currently SpO2: 93 % requiring Nasal Cannula O2 Flow Rate (L/min): 1 L/min2L NC    Cont to supplement NC to keep sats >90%  Could be secondary to ANA MARIA, will need OP sleep study      Anemia  Assessment & Plan  Recent Labs     02/17/24 2050 02/18/24  0550 02/19/24  0524   HGB 11.0* 10.6* 9.9*   MCV 82 84 84       Stable Hb  Cont to monitor      Class 3 severe obesity due to excess calories with serious comorbidity and body mass index (BMI) of 60.0 to 69.9 in adult (HCC)  Assessment & Plan  Body mass index is 69.06 kg/m².    Recommend incorporating a more whole foods plant-predominant diet along with decreasing consumption of red meats and processed foods  Per AHA guidelines, recommend moderate-vigorous intensity exercise for 30 minutes a day for 5 days a week or a total of 150 min/week               VTE Pharmacologic Prophylaxis: VTE Score: 5 High Risk (Score >/= 5) - Pharmacological DVT Prophylaxis Ordered:  enoxaparin (Lovenox). Sequential Compression Devices Ordered.    Mobility:   Basic Mobility Inpatient Raw Score: 18  JH-HLM Goal: 6: Walk 10 steps or more  JH-HLM Achieved: 6: Walk 10 steps or more  HLM Goal achieved. Continue to encourage appropriate mobility.    Patient Centered Rounds: I performed bedside rounds with nursing staff today.     Education and Discussions with Family / Patient: Patient declined call to .     Total Time Spent on Date of Encounter in care of patient: 45 mins. This time was spent on one or more of the following: performing physical exam; counseling and coordination of care; obtaining or reviewing history; documenting in the medical record; reviewing/ordering tests, medications or procedures; communicating with other healthcare professionals and discussing with patient's family/caregivers.    Current Length of Stay: 2 day(s)  Current Patient Status: Inpatient   Certification Statement: The patient will continue to require additional inpatient hospital stay due to ongoing treatment for cellulitis and further monitoring for requirement of supplemental oxygen  Discharge Plan: Anticipate discharge in 48-72 hrs to to be decided based on evolution of clinical picture    Code Status: Level 1 - Full Code    Subjective:   Patient seen and examined at bedside during a.m. rounds.  Patient laying comfortably in bed.  States that she is having pain in her left buttock area.  She also endorses some pus from one of the lesions on her right leg more than the debrided skin area.  She denies pain in the right leg    Objective:     Vitals:   Temp (24hrs), Av.4 °F (36.9 °C), Min:98.1 °F (36.7 °C), Max:98.6 °F (37 °C)    Temp:  [98.1 °F (36.7 °C)-98.6 °F (37 °C)] 98.1 °F (36.7 °C)  HR:  [87-90] 89  Resp:  [17] 17  BP: (143-146)/(78-82) 144/78  SpO2:  [93 %-96 %] 93 %  Body mass index is 69.06 kg/m².     Input and Output Summary (last 24 hours):     Intake/Output Summary (Last 24 hours) at  2/19/2024 1453  Last data filed at 2/18/2024 2124  Gross per 24 hour   Intake 600 ml   Output --   Net 600 ml       Physical Exam:   Physical Exam S1-S2 audible, regular  Lungs are to auscultation bilaterally  Morbidly obese  Right lower extremity shows signs of cellulitis with erythema and tenderness and warmth around the shin area which is marked.  There is an area of previous debridement which is visible in the.  One of the blisterlike lesions in the debrided area does endorse puslike liquid  No new focal neurological deficit  Alert and oriented to time place and person    Additional Data:     Labs:  Results from last 7 days   Lab Units 02/19/24  0524 02/18/24  0550   WBC Thousand/uL 3.92* 4.96   HEMOGLOBIN g/dL 9.9* 10.6*   HEMATOCRIT % 34.2* 36.8   PLATELETS Thousands/uL 235 255   NEUTROS PCT %  --  49   LYMPHS PCT %  --  34   MONOS PCT %  --  11   EOS PCT %  --  5     Results from last 7 days   Lab Units 02/19/24  0524 02/18/24  0550   SODIUM mmol/L 143 139   POTASSIUM mmol/L 4.2 3.9   CHLORIDE mmol/L 108 107   CO2 mmol/L 29 24   BUN mg/dL 8 11   CREATININE mg/dL 0.82 0.73   ANION GAP mmol/L 6 8   CALCIUM mg/dL 8.9 9.1   ALBUMIN g/dL  --  3.7   TOTAL BILIRUBIN mg/dL  --  0.46   ALK PHOS U/L  --  44   ALT U/L  --  33   AST U/L  --  26   GLUCOSE RANDOM mg/dL 85 99                 Results from last 7 days   Lab Units 02/18/24  0106 02/17/24 2050   LACTIC ACID mmol/L 1.7 2.4*       Lines/Drains:  Invasive Devices       Peripheral Intravenous Line  Duration             Peripheral IV 02/17/24 Left Antecubital 1 day    Peripheral IV 02/18/24 Distal;Dorsal (posterior);Right Forearm <1 day                          Imaging: Reviewed radiology reports from this admission including: CT right lower extremity    Recent Cultures (last 7 days):   Results from last 7 days   Lab Units 02/17/24 2059 02/17/24 2050   BLOOD CULTURE  Received in Microbiology Lab. Culture in Progress. Received in Microbiology Lab. Culture in  Progress.       Last 24 Hours Medication List:   Current Facility-Administered Medications   Medication Dose Route Frequency Provider Last Rate    acetaminophen  650 mg Oral Q6H PRN [facility] Paramangelaan, DO      baclofen  20 mg Oral TID LUC Mukherjee      cefTRIAXone  2,000 mg Intravenous Q24H [facility] Paramangelaan, DO 2,000 mg (02/19/24 7042)    Zkwdr-Lklol-Mhjlvidn-TenofAF  1 tablet Oral Daily [facility] Mindian, DO      enoxaparin  60 mg Subcutaneous Q12H LISS [facility] Parameschellean, DO      gabapentin  100 mg Oral TID [facility] Romeochellean, DO      hydrALAZINE  10 mg Intravenous Q6H [facility]N [facility]      HYDROmorphone  0.2 mg Intravenous Q4H PRN [facility]      hydrOXYzine HCL  50 mg Oral Q6H PRN LUC Mukherjee      labetalol  10 mg Intravenous Q6H PRN [facility], DO      oxyCODONE  2.5 mg Oral Q4H PRN [facility] Paramangelaan, DO      Or    oxyCODONE  5 mg Oral Q4H PRN [facility] Paramangelaan, DO      vancomycin  1,500 mg Intravenous Q12H Cristybabita Anaya, DO 1,500 mg (02/18/24 7227)        Today, Patient Was Seen By: Sergio Bolton MD    **Please Note: This note may have been constructed using a voice recognition system.**

## 2024-02-19 NOTE — ASSESSMENT & PLAN NOTE
Recent Labs     02/17/24 2050 02/18/24  0550 02/19/24  0524   HGB 11.0* 10.6* 9.9*   MCV 82 84 84       Stable Hb  Cont to monitor

## 2024-02-19 NOTE — PROGRESS NOTES
Robyn Lyons is a 53 y.o. female who is currently ordered Vancomycin IV with management by the Pharmacy Consult service.  Relevant clinical data and objective / subjective history reviewed.  Vancomycin Assessment:  Indication and Goal AUC/Trough: Soft tissue (goal -600, trough >10)  Clinical Status: stable  Micro:   Pending  Renal Function:  SCr: 0.82 mg/dL  CrCl: 146.3 mL/min  Renal replacement: Not on dialysis  Days of Therapy: 3  Current Dose: 1500mg IV q12h  Vancomycin Plan:  New Dosing: continue current dosing  Estimated AUC: 467 mcg*hr/mL  Estimated Trough: 11.3 mcg/mL  Next Level: 2/26/24  Renal Function Monitoring: Daily BMP and UOP  Pharmacy will continue to follow closely for s/sx of nephrotoxicity, infusion reactions and appropriateness of therapy.  BMP and CBC will be ordered per protocol. We will continue to follow the patient’s culture results and clinical progress daily.    Ludmila Arenas, Pharmacist

## 2024-02-19 NOTE — ASSESSMENT & PLAN NOTE
Reportedly had a blister in the right lower extremity that had popped a few days prior spontaneously  Does not meet SIRS or sepsis criteria at this time  Continue ceftriaxone and vancomycin as there is purulence coming from one of the lesion  CT scan results reviewed - consistent with cellulitis  Follow blood cultures  Cont wound care

## 2024-02-19 NOTE — UTILIZATION REVIEW
NOTIFICATION OF INPATIENT ADMISSION   AUTHORIZATION REQUEST   SERVICING FACILITY:   [place] [address]  Tax ID: [id number]  NPI: [id number] ATTENDING PROVIDER:  Attending Name and NPI#: Sergio [id number] Md [[id number]]  Address: [address] [place] [address]  Phone: [phone]     ADMISSION INFORMATION:  Place of Service: Inpatient [facility]  Place of Service Code: 21  Inpatient Admission Date/Time: 2/17/24 10:13 PM  Discharge Date/Time: No discharge date for patient encounter.  Admitting Diagnosis Code/Description:  Leg pain [M79.606]  Cellulitis of right lower extremity [L03.115]     UTILIZATION REVIEW CONTACT:  Celeste Farmer Utilization   Network Utilization Review Department  Phone: [phone]  Fax [phone]  Email: [email or web address]  Contact for approvals/pending authorizations, clinical reviews, and discharge.     PHYSICIAN ADVISORY SERVICES:  Medical Necessity Denial & Kpqo-js-Ibqh Review  Phone: [phone]  Fax: [phone]  Email: [email or web address]     DISCHARGE SUPPORT TEAM:  For Patients Discharge Needs & Updates  Phone: [phone] opt. 2 Fax: [phone]  Email: [email or web address]

## 2024-02-19 NOTE — WOUND OSTOMY CARE
Progress Note - Wound   Robyn Lyons 53 y.o. female MRN: [id number]  Unit/Bed#: -01 Encounter: [id number]      Assessment:   Patient admitted to [facility] due to cellulitis of right lower extremity. History of HIV, anemia, obesity. Wound care consulted for right lower extremity wound. Patient agreeable to assessment, alert and oriented x4, continent of bowel and bladder, on a bariatric low air loss mattress, assist of 1 to turn for assessment, is an assist with care. Primary RN made aware of assessment.    1. Bilateral sacrum, buttock, hips, elbows, and heels- skin is dry, intact, blanchable.    2. Bilateral abdominal pannus and beneath breast- skin is dry, intact, no open aspects and no rash present.    3. Right posterior tibial distal- Patient states wound is site of a ruptured blister. Wound is oval in shape, partial thickness, 100% dry pink tissue, no drainage noted. [place]-wound is dry, intact, erythematous, swollen. -patient is receiving IV antibiotics.    Educated patient on importance of frequent offloading of pressure via turning, repositioning, and weight-shifting. Verbalized understanding of plan of care.    No induration, fluctuance, odor, or purulence noted to the above noted wound. New dressing applied. Patient tolerated well, reports mild pain to the wound. Primary nurse aware of plan of care. See flow sheets for more detailed assessment findings. Will follow along.      Skin care plans:  1-Hydraguard to bilateral sacrum, buttock and heels BID and PRN  2-Elevate heels to offload pressure.  3-Ehob cushion in chair when out of bed.  4-Moisturize skin daily with skin nourishing cream.  5-Turn/reposition q2h for pressure re-distribution on skin.   6-Right posterior distal tibial- Cleanse wound with NSS, pat dry. Apply Xeroform over wound bed and cover with small bordered silicone foam dressing. Change every other day and as needed for soilage/displacement.      Wound 02/18/24 Other (comment)  Cellulitis Pretibial Right (Active)   Wound Image   02/19/24 1024   Wound Description Dry;Intact 02/19/24 1024   Chuyita-wound Assessment Dry;Intact;Erythema;Swelling;Scar Tissue 02/19/24 1024   Wound Length (cm) 0 cm 02/19/24 1024   Wound Width (cm) 0 cm 02/19/24 1024   Wound Depth (cm) 0 cm 02/19/24 1024   Wound Surface Area (cm^2) 0 cm^2 02/19/24 1024   Wound Volume (cm^3) 0 cm^3 02/19/24 1024   Calculated Wound Volume (cm^3) 0 cm^3 02/19/24 1024                 Wound 02/19/24 Tibial Posterior;Right;Distal (Active)   Wound Image   02/19/24 1025   Wound Description Dry;Pink 02/19/24 1025   Chuyita-wound Assessment Dry;Intact;Erythema;Swelling 02/19/24 1025   Wound Length (cm) 2.8 cm 02/19/24 1025   Wound Width (cm) 5 cm 02/19/24 1025   Wound Depth (cm) 0.1 cm 02/19/24 1025   Wound Surface Area (cm^2) 14 cm^2 02/19/24 1025   Wound Volume (cm^3) 1.4 cm^3 02/19/24 1025   Calculated Wound Volume (cm^3) 1.4 cm^3 02/19/24 1025   Drainage Amount None 02/19/24 1025   Non-staged Wound Description Partial thickness 02/19/24 1025   Treatments Cleansed;Irrigation with NSS;Site care 02/19/24 1025   Dressing Xeroform;Foam, Silicon (eg. Allevyn, etc) 02/19/24 1025   Wound packed? No 02/19/24 1025   Dressing Changed Changed 02/19/24 1025   Patient Tolerance Tolerated well 02/19/24 1025   Dressing Status Clean;Dry;Intact 02/19/24 1025       Call or tigertext with any questions  Wound Care will continue to follow    Natalie KINGN RN [facility]  Wound and Ostomy care

## 2024-02-19 NOTE — CASE MANAGEMENT
Case Management Assessment & Discharge Planning Note    Patient name Robyn Lyons  Location /-01 MRN [id number]  : 1970 Date 2024       Current Admission Date: 2024  Current Admission Diagnosis:Cellulitis of right lower extremity   Patient Active Problem List    Diagnosis Date Noted    Cellulitis of right lower extremity 2024    Class 3 severe obesity due to excess calories with serious comorbidity and body mass index (BMI) of 60.0 to 69.9 in adult (HCC) 2024    Anemia 2024    Acute respiratory failure with hypoxia (HCC) 2024    Asymptomatic HIV infection, with no history of HIV-related illness (HCC) 2024    Obesity hypoventilation syndrome ([facility]) 2024    Ambulatory dysfunction 2024      LOS (days): 2  Geometric Mean LOS (GMLOS) (days):   Days to GMLOS:     OBJECTIVE:    Risk of Unplanned Readmission Score: 8.12         Current admission status: Inpatient       Preferred Pharmacy:   [facility] - [address]  [address]  [facility] [address]  [place] [address]  Phone: [phone] Fax: [phone]    Primary Care Provider: No primary care provider on file.    Primary Insurance: [facility] [place] DUAL COMPLETE  REP  Secondary Insurance: [facility]    ASSESSMENT:  Active Health Care Proxies    There are no active Health Care Proxies on file.                 Readmission Root Cause  30 Day Readmission: No    Patient Information  Admitted from:: Home  Mental Status: Alert  During Assessment patient was accompanied by: Not accompanied during assessment  Assessment information provided by:: Patient  Primary Caregiver: Family  Caregiver's Name:: Multiple children in home  Support Systems: Self, Family members  County of Residence: [place]  What city do you live in?: [place]  Home entry access options. Select all that apply.: Stairs  Number of steps to enter home.: 5  Type of Current  Residence: [address] [place] home  Upon entering residence, is there a bedroom on the main floor (no further steps)?: Yes  Upon entering residence, is there a bathroom on the main floor (no further steps)?: Yes  Living Arrangements: Other (Comment) (pt lives with multiple children)  Is patient a ?: No    Activities of Daily Living Prior to Admission  Functional Status: Assistance  Completes ADLs independently?: No  Level of ADL dependence: Assistance  Ambulates independently?: No  Level of ambulatory dependence: Assistance  Does patient use assisted devices?: Yes  Assisted Devices (DME) used: Bedside Commode  Does patient currently own DME?: Yes  What DME does the patient currently own?: Bedside Commode  Does patient have a history of Outpatient Therapy (PT/OT)?: No  Does the patient have a history of Short-Term Rehab?: No  Does patient have a history of HHC?: No  Does patient currently have HHC?: No         Patient Information Continued  Income Source: Pension/snf  Does patient have prescription coverage?: Yes  Does patient receive dialysis treatments?: No  Does patient have a history of substance abuse?: No  Does patient have a history of Mental Health Diagnosis?: No    PHQ 2/9 Screening   Reviewed PHQ 2/9 Depression Screening Score?: No    Means of Transportation  Means of Transport to Appts:: Public Transportation - Uber (pt stated lyft or uber, from SW or family)      Social Determinants of Health (SDOH)      Flowsheet Row Most Recent Value   Housing Stability    In the last 12 months, was there a time when you were not able to pay the mortgage or rent on time? Y   In the last 12 months, how many places have you lived? 1   In the last 12 months, was there a time when you did not have a steady place to sleep or slept in a shelter (including now)? N   Transportation Needs    In the past 12 months, has lack of transportation kept you from medical appointments or from getting medications? no   In the past 12  months, has lack of transportation kept you from meetings, work, or from getting things needed for daily living? No   Food Insecurity    Within the past 12 months, you worried that your food would run out before you got the money to buy more. Never true   Within the past 12 months, the food you bought just didn't last and you didn't have money to get more. Never true   Utilities    In the past 12 months has the electric, gas, oil, or water company threatened to shut off services in your home? Yes            DISCHARGE DETAILS:    Discharge planning discussed with:: Patient at bedside  Freedom of Choice: Yes  Comments - Freedom of Choice: CM discussed freedom of choice as it pertains to discharge planning. Patient denied rehab and was agreeable to hhc. Patient asked for a walker and shower chair to be submitted for DME.  CM contacted family/caregiver?: No- see comments  Were Treatment Team discharge recommendations reviewed with patient/caregiver?: Yes  Did patient/caregiver verbalize understanding of patient care needs?: Yes  Were patient/caregiver advised of the risks associated with not following Treatment Team discharge recommendations?: Yes         Requested Home Health Care         Is the patient interested in HHC at discharge?: Yes  Home Health Discipline requested:: Nursing, Occupational Therapy, Physical Therapy  Home Health Agency Name:: Other  Home Health Follow-Up Provider:: PCP  Home Health Services Needed:: Evaluate Functional Status and Safety, Gait/ADL Training, Heart Failure Management, Strengthening/Theraputic Exercises to Improve Function, Wound/Ostomy Care  Homebound Criteria Met:: Uses an Assist Device (i.e. cane, walker, etc), Requires the Assistance of Another Person for Safe Ambulation or to Leave the Home  Supporting Clincal Findings:: Fatigues Easliy in Short Distances, Limited Endurance    DME Referral Provided  Referral made for DME?: Yes  DME referral completed for the following items::  Keisha Orozco  DME Supplier Name:: [employer]    Other Referral/Resources/Interventions Provided:  Interventions: DME, HHC  Referral Comments: cm sent referral for DME walker, showerchair, [place] referral for HHC sent    Would you like to participate in our [facility] service program?  : No - Declined    Treatment Team Recommendation: SNF, Short Term Rehab, Acute Rehab  Discharge Destination Plan:: Home with Home Health Care  Transport at Discharge : [facility]

## 2024-02-20 LAB
ANION GAP SERPL CALCULATED.3IONS-SCNC: 7 MMOL/L
BASOPHILS # BLD AUTO: 0.02 THOUSANDS/ÂΜL (ref 0–0.1)
BASOPHILS NFR BLD AUTO: 1 % (ref 0–1)
BUN SERPL-MCNC: 8 MG/DL (ref 5–25)
CALCIUM SERPL-MCNC: 8.7 MG/DL (ref 8.4–10.2)
CHLORIDE SERPL-SCNC: 106 MMOL/L (ref 96–108)
CO2 SERPL-SCNC: 28 MMOL/L (ref 21–32)
CREAT SERPL-MCNC: 0.85 MG/DL (ref 0.6–1.3)
EOSINOPHIL # BLD AUTO: 0.19 THOUSAND/ÂΜL (ref 0–0.61)
EOSINOPHIL NFR BLD AUTO: 5 % (ref 0–6)
ERYTHROCYTE [DISTWIDTH] IN BLOOD BY AUTOMATED COUNT: 18.5 % (ref 11.6–15.1)
GFR SERPL CREATININE-BSD FRML MDRD: 78 ML/MIN/1.73SQ M
GLUCOSE SERPL-MCNC: 91 MG/DL (ref 65–140)
HCT VFR BLD AUTO: 33.3 % (ref 34.8–46.1)
HGB BLD-MCNC: 9.9 G/DL (ref 11.5–15.4)
IMM GRANULOCYTES # BLD AUTO: 0.01 THOUSAND/UL (ref 0–0.2)
IMM GRANULOCYTES NFR BLD AUTO: 0 % (ref 0–2)
LYMPHOCYTES # BLD AUTO: 1.39 THOUSANDS/ÂΜL (ref 0.6–4.47)
LYMPHOCYTES NFR BLD AUTO: 36 % (ref 14–44)
MCH RBC QN AUTO: 24.6 PG (ref 26.8–34.3)
MCHC RBC AUTO-ENTMCNC: 29.7 G/DL (ref 31.4–37.4)
MCV RBC AUTO: 83 FL (ref 82–98)
MONOCYTES # BLD AUTO: 0.52 THOUSAND/ÂΜL (ref 0.17–1.22)
MONOCYTES NFR BLD AUTO: 14 % (ref 4–12)
NEUTROPHILS # BLD AUTO: 1.72 THOUSANDS/ÂΜL (ref 1.85–7.62)
NEUTS SEG NFR BLD AUTO: 44 % (ref 43–75)
NRBC BLD AUTO-RTO: 0 /100 WBCS
PLATELET # BLD AUTO: 244 THOUSANDS/UL (ref 149–390)
PMV BLD AUTO: 8.7 FL (ref 8.9–12.7)
POTASSIUM SERPL-SCNC: 4 MMOL/L (ref 3.5–5.3)
PROCALCITONIN SERPL-MCNC: 0.06 NG/ML
RBC # BLD AUTO: 4.02 MILLION/UL (ref 3.81–5.12)
SODIUM SERPL-SCNC: 141 MMOL/L (ref 135–147)
WBC # BLD AUTO: 3.85 THOUSAND/UL (ref 4.31–10.16)

## 2024-02-20 PROCEDURE — 94664 DEMO&/EVAL PT USE INHALER: CPT

## 2024-02-20 PROCEDURE — 85025 COMPLETE CBC W/AUTO DIFF WBC: CPT | Performed by: FAMILY MEDICINE

## 2024-02-20 PROCEDURE — 94640 AIRWAY INHALATION TREATMENT: CPT

## 2024-02-20 PROCEDURE — 94760 N-INVAS EAR/PLS OXIMETRY 1: CPT

## 2024-02-20 PROCEDURE — 80048 BASIC METABOLIC PNL TOTAL CA: CPT | Performed by: FAMILY MEDICINE

## 2024-02-20 PROCEDURE — 84145 PROCALCITONIN (PCT): CPT | Performed by: FAMILY MEDICINE

## 2024-02-20 PROCEDURE — 99232 SBSQ HOSP IP/OBS MODERATE 35: CPT | Performed by: STUDENT IN AN ORGANIZED HEALTH CARE EDUCATION/TRAINING PROGRAM

## 2024-02-20 RX ORDER — ALBUTEROL SULFATE 2.5 MG/3ML
2.5 SOLUTION RESPIRATORY (INHALATION) EVERY 6 HOURS PRN
Status: DISCONTINUED | OUTPATIENT
Start: 2024-02-20 | End: 2024-02-27 | Stop reason: HOSPADM

## 2024-02-20 RX ADMIN — OXYCODONE HYDROCHLORIDE 5 MG: 5 TABLET ORAL at 09:29

## 2024-02-20 RX ADMIN — HYDROXYZINE HYDROCHLORIDE 50 MG: 25 TABLET ORAL at 16:47

## 2024-02-20 RX ADMIN — HYDROMORPHONE HYDROCHLORIDE 0.2 MG: 0.2 INJECTION, SOLUTION INTRAMUSCULAR; INTRAVENOUS; SUBCUTANEOUS at 15:29

## 2024-02-20 RX ADMIN — GABAPENTIN 100 MG: 100 CAPSULE ORAL at 21:27

## 2024-02-20 RX ADMIN — VANCOMYCIN HYDROCHLORIDE 1500 MG: 5 INJECTION, POWDER, LYOPHILIZED, FOR SOLUTION INTRAVENOUS at 11:06

## 2024-02-20 RX ADMIN — OXYCODONE HYDROCHLORIDE 5 MG: 5 TABLET ORAL at 05:04

## 2024-02-20 RX ADMIN — BACLOFEN 20 MG: 10 TABLET ORAL at 15:29

## 2024-02-20 RX ADMIN — BACLOFEN 20 MG: 10 TABLET ORAL at 09:29

## 2024-02-20 RX ADMIN — DARUNAVIR, COBICISTAT, EMTRICITABINE, AND TENOFOVIR ALAFENAMIDE 1 TABLET: 800; 150; 200; 10 TABLET, FILM COATED ORAL at 09:29

## 2024-02-20 RX ADMIN — VANCOMYCIN HYDROCHLORIDE 1500 MG: 5 INJECTION, POWDER, LYOPHILIZED, FOR SOLUTION INTRAVENOUS at 23:41

## 2024-02-20 RX ADMIN — OXYCODONE HYDROCHLORIDE 5 MG: 5 TABLET ORAL at 13:57

## 2024-02-20 RX ADMIN — CEFTRIAXONE 2000 MG: 2 INJECTION, SOLUTION INTRAVENOUS at 12:56

## 2024-02-20 RX ADMIN — GABAPENTIN 100 MG: 100 CAPSULE ORAL at 15:28

## 2024-02-20 RX ADMIN — HYDROMORPHONE HYDROCHLORIDE 0.2 MG: 0.2 INJECTION, SOLUTION INTRAMUSCULAR; INTRAVENOUS; SUBCUTANEOUS at 01:47

## 2024-02-20 RX ADMIN — ALBUTEROL SULFATE 2.5 MG: 2.5 SOLUTION RESPIRATORY (INHALATION) at 02:30

## 2024-02-20 RX ADMIN — BACLOFEN 20 MG: 10 TABLET ORAL at 21:27

## 2024-02-20 RX ADMIN — ENOXAPARIN SODIUM 60 MG: 60 INJECTION SUBCUTANEOUS at 21:27

## 2024-02-20 RX ADMIN — GABAPENTIN 100 MG: 100 CAPSULE ORAL at 09:29

## 2024-02-20 RX ADMIN — ENOXAPARIN SODIUM 60 MG: 60 INJECTION SUBCUTANEOUS at 09:30

## 2024-02-20 RX ADMIN — HYDROXYZINE HYDROCHLORIDE 50 MG: 25 TABLET ORAL at 00:40

## 2024-02-20 RX ADMIN — OXYCODONE HYDROCHLORIDE 5 MG: 5 TABLET ORAL at 19:40

## 2024-02-20 RX ADMIN — ALBUTEROL SULFATE 2.5 MG: 2.5 SOLUTION RESPIRATORY (INHALATION) at 23:11

## 2024-02-20 RX ADMIN — HYDROMORPHONE HYDROCHLORIDE 0.2 MG: 0.2 INJECTION, SOLUTION INTRAMUSCULAR; INTRAVENOUS; SUBCUTANEOUS at 06:18

## 2024-02-20 RX ADMIN — HYDROMORPHONE HYDROCHLORIDE 0.2 MG: 0.2 INJECTION, SOLUTION INTRAMUSCULAR; INTRAVENOUS; SUBCUTANEOUS at 21:27

## 2024-02-20 RX ADMIN — HYDROMORPHONE HYDROCHLORIDE 0.2 MG: 0.2 INJECTION, SOLUTION INTRAMUSCULAR; INTRAVENOUS; SUBCUTANEOUS at 11:07

## 2024-02-20 NOTE — PLAN OF CARE
Problem: SAFETY ADULT  Goal: Patient will remain free of falls  Description: INTERVENTIONS:  - Educate patient/family on patient safety including physical limitations  - Instruct patient to call for assistance with activity   - Consult OT/PT to assist with strengthening/mobility   - Keep Call bell within reach  - Keep bed low and locked with side rails adjusted as appropriate  - Keep care items and personal belongings within reach  - Initiate and maintain comfort rounds  - Make Fall Risk Sign visible to staff  - Offer Toileting every 2 Hours, in advance of need  - Initiate/Maintain bed alarm  - Apply yellow socks and bracelet for high fall risk patients  - Consider moving patient to room near nurses station  Outcome: Progressing

## 2024-02-20 NOTE — PROGRESS NOTES
Robyn Lyons is a 53 y.o. female who is currently ordered Vancomycin IV with management by the Pharmacy Consult service.  Relevant clinical data and objective / subjective history reviewed.  Vancomycin Assessment:  Indication and Goal AUC/Trough: Soft tissue (goal -600, trough >10)  Clinical Status: stable  Micro:     Renal Function:  SCr: 0.85 mg/dL  CrCl: 141.1 mL/min  Renal replacement: Not on dialysis  Days of Therapy: 4  Current Dose: 1500mg IV q12h  Vancomycin Plan:  New Dosing: continue 1500mg IV q12h  Estimated AUC: 484 mcg*hr/mL  Estimated Trough: 11.9 mcg/mL  Next Level: 2/26/24 AM level   Renal Function Monitoring: Daily BMP and UOP  Pharmacy will continue to follow closely for s/sx of nephrotoxicity, infusion reactions and appropriateness of therapy.  BMP and CBC will be ordered per protocol. We will continue to follow the patient’s culture results and clinical progress daily.    Vielka Hernandez, Pharmacist

## 2024-02-20 NOTE — ASSESSMENT & PLAN NOTE
Reportedly had a blister in the right lower extremity that had popped a few days prior spontaneously  Does not meet SIRS or sepsis criteria at this time  Continue ceftriaxone and vancomycin as there is purulence coming from one of the lesion  CT scan results reviewed - consistent with cellulitis  Blood cultures negative thus far  Cont wound care  Improving, has less pain today

## 2024-02-20 NOTE — ASSESSMENT & PLAN NOTE
Currently SpO2: 95 % requiring Nasal Cannula O2 Flow Rate (L/min): 1 L/min2L NC    Cont to supplement NC to keep sats >90%  Could be secondary to ANA MARIA, will need OP sleep study

## 2024-02-20 NOTE — PROGRESS NOTES
[facility]  Progress Note  Name: Robyn Lyons I  MRN: [id number]  Unit/Bed#: MS [facility]-01 I Date of Admission: 2/17/2024   Date of Service: 2/20/2024 I Hospital Day: 3    Assessment/Plan   * Cellulitis of right lower extremity  Assessment & Plan  Reportedly had a blister in the right lower extremity that had popped a few days prior spontaneously  Does not meet SIRS or sepsis criteria at this time  Continue ceftriaxone and vancomycin as there is purulence coming from one of the lesion  CT scan results reviewed - consistent with cellulitis  Blood cultures negative thus far  Cont wound care  Improving, has less pain today       Asymptomatic HIV infection, with no history of HIV-related illness (HCC)  Assessment & Plan  Continue home [place] [phone]    Acute respiratory failure with hypoxia (HCC)  Assessment & Plan  Currently SpO2: 95 % requiring Nasal Cannula O2 Flow Rate (L/min): 1 L/min2L NC    Cont to supplement NC to keep sats >90%  Could be secondary to ANA MARIA, will need OP sleep study                 VTE Pharmacologic Prophylaxis: VTE Score: 5 High Risk (Score >/= 5) - Pharmacological DVT Prophylaxis Ordered: enoxaparin (Lovenox). Sequential Compression Devices Ordered.    Mobility:   Basic Mobility Inpatient Raw Score: 18  JH-HLM Goal: 6: Walk 10 steps or more  JH-HLM Achieved: 6: Walk 10 steps or more  HLM Goal achieved. Continue to encourage appropriate mobility.    Patient Centered Rounds: I performed bedside rounds with nursing staff today.   Discussions with Specialists or Other Care Team Provider: sadi    Education and Discussions with Family / Patient: Patient declined call to .     Total Time Spent on Date of Encounter in care of patient: 30+ mins. This time was spent on one or more of the following: performing physical exam; counseling and coordination of care; obtaining or reviewing history; documenting in the medical record; reviewing/ordering tests,  medications or procedures; communicating with other healthcare professionals and discussing with patient's family/caregivers.    Current Length of Stay: 3 day(s)  Current Patient Status: Inpatient   Certification Statement: The patient will continue to require additional inpatient hospital stay due to IV antibiotics   Discharge Plan: Anticipate discharge in 24-48 hrs to home.    Code Status: Level 1 - Full Code    Subjective:   Patient feels better today     Objective:     Vitals:   Temp (24hrs), Av.3 °F (37.4 °C), Min:99.2 °F (37.3 °C), Max:99.5 °F (37.5 °C)    Temp:  [99.2 °F (37.3 °C)-99.5 °F (37.5 °C)] 99.2 °F (37.3 °C)  HR:  [82-96] 94  Resp:  [19] 19  BP: (124-131)/(59-74) 124/59  SpO2:  [93 %-96 %] 95 %  Body mass index is 69.06 kg/m².     Input and Output Summary (last 24 hours):     Intake/Output Summary (Last 24 hours) at 2024 1323  Last data filed at 2024 0931  Gross per 24 hour   Intake 1080 ml   Output 1023 ml   Net 57 ml       Physical Exam:   Physical Exam  Constitutional:       General: She is not in acute distress.     Appearance: Normal appearance. She is not toxic-appearing.   Cardiovascular:      Rate and Rhythm: Normal rate and regular rhythm.      Heart sounds: Normal heart sounds. No murmur heard.  Pulmonary:      Effort: Pulmonary effort is normal. No respiratory distress.      Breath sounds: Normal breath sounds. No wheezing.   Abdominal:      General: Abdomen is flat. There is no distension.      Palpations: Abdomen is soft.      Tenderness: There is no abdominal tenderness.   Neurological:      General: No focal deficit present.      Mental Status: She is alert and oriented to person, place, and time. Mental status is at baseline.      Motor: No weakness.          Additional Data:     Labs:  Results from last 7 days   Lab Units 24  0454   WBC Thousand/uL 3.85*   HEMOGLOBIN g/dL 9.9*   HEMATOCRIT % 33.3*   PLATELETS Thousands/uL 244   NEUTROS PCT % 44   LYMPHS PCT % 36    MONOS PCT % 14*   EOS PCT % 5     Results from last 7 days   Lab Units 02/20/24  0454 02/19/24  0524 02/18/24  0550   SODIUM mmol/L 141   < > 139   POTASSIUM mmol/L 4.0   < > 3.9   CHLORIDE mmol/L 106   < > 107   CO2 mmol/L 28   < > 24   BUN mg/dL 8   < > 11   CREATININE mg/dL 0.85   < > 0.73   ANION GAP mmol/L 7   < > 8   CALCIUM mg/dL 8.7   < > 9.1   ALBUMIN g/dL  --   --  3.7   TOTAL BILIRUBIN mg/dL  --   --  0.46   ALK PHOS U/L  --   --  44   ALT U/L  --   --  33   AST U/L  --   --  26   GLUCOSE RANDOM mg/dL 91   < > 99    < > = values in this interval not displayed.                 Results from last 7 days   Lab Units 02/20/24  0454 02/18/24  0106 02/17/24 2050   LACTIC ACID mmol/L  --  1.7 2.4*   PROCALCITONIN ng/ml 0.06  --   --        Lines/Drains:  Invasive Devices       Peripheral Intravenous Line  Duration             Peripheral IV 02/17/24 Left Antecubital 2 days    Peripheral IV 02/18/24 Distal;Dorsal (posterior);Right Forearm 1 day                          Imaging: No pertinent imaging reviewed.    Recent Cultures (last 7 days):   Results from last 7 days   Lab Units 02/17/24 2059 02/17/24 2050   BLOOD CULTURE  No Growth at 24 hrs. No Growth at 24 hrs.       Last 24 Hours Medication List:   Current Facility-Administered Medications   Medication Dose Route Frequency Provider Last Rate    acetaminophen  650 mg Oral Q6H PRN Som Ceron, DO      albuterol  2.5 mg Nebulization Q6H PRN LUC Downs      baclofen  20 mg Oral TID LUC Mukherjee      cefTRIAXone  2,000 mg Intravenous Q24H Som Ceron, DO 2,000 mg (02/20/24 1256)    Sppvv-Ajdfz-Tlgdrrvi-TenofAF  1 tablet Oral Daily Som Ceron, DO      enoxaparin  60 mg Subcutaneous Q12H [facility] Som Ceron, DO      gabapentin  100 mg Oral TID Som Ceron, DO      hydrALAZINE  10 mg Intravenous Q6H PRN Som Ceron,       HYDROmorphone  0.2 mg Intravenous Q4H PRN Som Ceron,        hydrOXYzine HCL  50 mg Oral Q6H PRN LUC Mukherjee      labetalol  10 mg Intravenous Q6H PRN Som Parameswaran, DO      oxyCODONE  2.5 mg Oral Q4H PRN Som Parameswaran, DO      Or    oxyCODONE  5 mg Oral Q4H PRN Som Parameswaran, DO      vancomycin  1,500 mg Intravenous Q12H Cristy Anaya, DO 1,500 mg (02/18/24 2239)        Today, Patient Was Seen By: Eran Andre MD    **Please Note: This note may have been constructed using a voice recognition system.**

## 2024-02-20 NOTE — RESPIRATORY THERAPY NOTE
"RT Protocol Note  Robyn Lyons 53 y.o. female MRN: [id number]  Unit/Bed#: -01 Encounter: [id number]    Assessment    Principal Problem:    Cellulitis of right lower extremity  Active Problems:    Class 3 severe obesity due to excess calories with serious comorbidity and body mass index (BMI) of 60.0 to 69.9 in adult (HCC)    Anemia    Acute respiratory failure with hypoxia (HCC)    Asymptomatic HIV infection, with no history of HIV-related illness (HCC)    Obesity hypoventilation syndrome (HCC)    Ambulatory dysfunction    Physical Exam:   Assessment Type: (P) Assess only  General Appearance: (P) Awake, Alert  Respiratory Pattern: (P) Dyspnea with exertion  Chest Assessment: (P) Chest expansion symmetrical  Bilateral Breath Sounds: (P) Diminished  O2 Device: (P) nc    Vitals:  Blood pressure 124/59, pulse (P) 94, temperature 99.2 °F (37.3 °C), resp. rate (P) 19, height 5' 7\" (1.702 m), weight (!) 200 kg (440 lb 14.7 oz), SpO2 (P) 95%.      O2 Device: (P) nc     Plan    Respiratory Plan: (P) No distress/Pulmonary history        Resp Comments: (P) pt admitted for cellulitis of RL leg, put has no known pulm hx. pt does not use any respiratory meds at home but has had SOB during stay. will cont w PRN nebs at his time. pt does not wear o2 chronically but currently req 5L o2. BBS diminished spo2 94%   "

## 2024-02-21 PROCEDURE — 97163 PT EVAL HIGH COMPLEX 45 MIN: CPT

## 2024-02-21 PROCEDURE — 99232 SBSQ HOSP IP/OBS MODERATE 35: CPT | Performed by: STUDENT IN AN ORGANIZED HEALTH CARE EDUCATION/TRAINING PROGRAM

## 2024-02-21 PROCEDURE — 94664 DEMO&/EVAL PT USE INHALER: CPT

## 2024-02-21 PROCEDURE — 97535 SELF CARE MNGMENT TRAINING: CPT

## 2024-02-21 PROCEDURE — 97530 THERAPEUTIC ACTIVITIES: CPT

## 2024-02-21 RX ADMIN — OXYCODONE HYDROCHLORIDE 5 MG: 5 TABLET ORAL at 21:18

## 2024-02-21 RX ADMIN — CEFTRIAXONE 2000 MG: 2 INJECTION, SOLUTION INTRAVENOUS at 11:14

## 2024-02-21 RX ADMIN — OXYCODONE HYDROCHLORIDE 5 MG: 5 TABLET ORAL at 16:26

## 2024-02-21 RX ADMIN — BACLOFEN 20 MG: 10 TABLET ORAL at 09:29

## 2024-02-21 RX ADMIN — BACLOFEN 20 MG: 10 TABLET ORAL at 21:18

## 2024-02-21 RX ADMIN — ENOXAPARIN SODIUM 60 MG: 60 INJECTION SUBCUTANEOUS at 21:18

## 2024-02-21 RX ADMIN — HYDROMORPHONE HYDROCHLORIDE 0.2 MG: 0.2 INJECTION, SOLUTION INTRAMUSCULAR; INTRAVENOUS; SUBCUTANEOUS at 09:33

## 2024-02-21 RX ADMIN — HYDROMORPHONE HYDROCHLORIDE 0.2 MG: 0.2 INJECTION, SOLUTION INTRAMUSCULAR; INTRAVENOUS; SUBCUTANEOUS at 04:46

## 2024-02-21 RX ADMIN — VANCOMYCIN HYDROCHLORIDE 1500 MG: 5 INJECTION, POWDER, LYOPHILIZED, FOR SOLUTION INTRAVENOUS at 12:24

## 2024-02-21 RX ADMIN — HYDROMORPHONE HYDROCHLORIDE 0.2 MG: 0.2 INJECTION, SOLUTION INTRAMUSCULAR; INTRAVENOUS; SUBCUTANEOUS at 22:44

## 2024-02-21 RX ADMIN — OXYCODONE HYDROCHLORIDE 5 MG: 5 TABLET ORAL at 11:13

## 2024-02-21 RX ADMIN — OXYCODONE HYDROCHLORIDE 5 MG: 5 TABLET ORAL at 01:13

## 2024-02-21 RX ADMIN — GABAPENTIN 100 MG: 100 CAPSULE ORAL at 16:26

## 2024-02-21 RX ADMIN — ENOXAPARIN SODIUM 60 MG: 60 INJECTION SUBCUTANEOUS at 09:29

## 2024-02-21 RX ADMIN — HYDROMORPHONE HYDROCHLORIDE 0.2 MG: 0.2 INJECTION, SOLUTION INTRAMUSCULAR; INTRAVENOUS; SUBCUTANEOUS at 14:30

## 2024-02-21 RX ADMIN — GABAPENTIN 100 MG: 100 CAPSULE ORAL at 21:18

## 2024-02-21 RX ADMIN — DARUNAVIR, COBICISTAT, EMTRICITABINE, AND TENOFOVIR ALAFENAMIDE 1 TABLET: 800; 150; 200; 10 TABLET, FILM COATED ORAL at 18:14

## 2024-02-21 RX ADMIN — VANCOMYCIN HYDROCHLORIDE 1500 MG: 5 INJECTION, POWDER, LYOPHILIZED, FOR SOLUTION INTRAVENOUS at 23:38

## 2024-02-21 RX ADMIN — GABAPENTIN 100 MG: 100 CAPSULE ORAL at 09:29

## 2024-02-21 RX ADMIN — BACLOFEN 20 MG: 10 TABLET ORAL at 16:26

## 2024-02-21 RX ADMIN — HYDROXYZINE HYDROCHLORIDE 50 MG: 25 TABLET ORAL at 21:18

## 2024-02-21 NOTE — CASE MANAGEMENT
Case Management Discharge Planning Note    Patient name Robyn Lyons  Location /-01 MRN [id number]  : 1970 Date 2024       Current Admission Date: 2024  Current Admission Diagnosis:Cellulitis of right lower extremity   Patient Active Problem List    Diagnosis Date Noted    Cellulitis of right lower extremity 2024    Class 3 severe obesity due to excess calories with serious comorbidity and body mass index (BMI) of 60.0 to 69.9 in adult (HCC) 2024    Anemia 2024    Acute respiratory failure with hypoxia (HCC) 2024    Asymptomatic HIV infection, with no history of HIV-related illness (HCC) 2024    Obesity hypoventilation syndrome ([facility]) 2024    Ambulatory dysfunction 2024      LOS (days): 4  Geometric Mean LOS (GMLOS) (days): 4.8  Days to GMLOS:1.2     OBJECTIVE:  Risk of Unplanned Readmission Score: 8.29         Current admission status: Inpatient   Preferred Pharmacy:   [facility] - [address]  [address]  [address]  [place] [address]  Phone: [phone] Fax: [phone]    Primary Care Provider: No primary care provider on file.    Primary Insurance: [facility] [place] DUAL COMPLETE  REP  Secondary Insurance: [facility]    DISCHARGE DETAILS:    Discharge planning discussed with:: Pt  Freedom of Choice: Yes  Comments - Freedom of Choice: M discussed freedom of choice as it pertains to discharge planning. Patient is agreeable to cm sending referrals for rehab for acute only. CM reviewed [facility] choices with pt and [facility]. [facility] was the only [facility] to approve pt.  CM contacted family/caregiver?: Yes (Family member on pts phone)  Were Treatment Team discharge recommendations reviewed with patient/caregiver?: Yes  Did patient/caregiver verbalize understanding of patient care needs?: Yes  Were patient/caregiver advised of the risks associated with not  following Treatment Team discharge recommendations?: Yes         Requested Home Health Care         Is the patient interested in HHC at discharge?: Yes  Home Health Discipline requested:: Nursing, Occupational Therapy, Physical Therapy  Home Health Agency Name:: [facility]  HHA External Referral Reason (only applicable if external HHA name selected): Services not provided in network or near patient location  Home Health Follow-Up Provider:: PCP  Home Health Services Needed:: Wound/Ostomy Care, Gait/ADL Training, Heart Failure Management, Strengthening/Theraputic Exercises to Improve Function  Homebound Criteria Met:: Uses an Assist Device (i.e. cane, walker, etc), Requires the Assistance of Another Person for Safe Ambulation or to Leave the Home  Supporting Clincal Findings:: Fatigues Easliy in Short Distances, Limited Endurance    DME Referral Provided  Referral made for DME?: Yes  DME referral completed for the following items:: Shower Chair (cm informed by insurance that pt was given a rollator in 2023)  DME Supplier Name:: [employer]    Other Referral/Resources/Interventions Provided:  Interventions: HHC  [facility]  [address] [place] [address] [place] [id number]  Phone: [phone]  Fax: [phone]    Would you like to participate in our [facility] service program?  : No - Declined    Treatment Team Recommendation: Short Term Rehab, Acute Rehab  Discharge Destination Plan:: Home with Home Health Care  [facility]

## 2024-02-21 NOTE — PHYSICAL THERAPY NOTE
Physical Therapy Evaluation     Patient's Name: Robyn Lyons    Admitting Diagnosis  Leg pain [M79.606]  Cellulitis of right lower extremity [L03.115]    Problem List  Patient Active Problem List   Diagnosis    Cellulitis of right lower extremity    Class 3 severe obesity due to excess calories with serious comorbidity and body mass index (BMI) of 60.0 to 69.9 in adult (HCC)    Anemia    Acute respiratory failure with hypoxia (HCC)    Asymptomatic HIV infection, with no history of HIV-related illness (HCC)    Obesity hypoventilation syndrome (HCC)    Ambulatory dysfunction     Past Medical History  Past Medical History:   Diagnosis Date    HIV (human immunodeficiency virus infection) ([facility])      Past Surgical History  History reviewed. No pertinent surgical history.     02/21/24 1032   PT Last Visit   PT Visit Date 02/21/24   Note Type   Note type Evaluation   Pain Assessment   Pain Assessment Tool 0-10   Pain Score 10 - Worst Possible Pain   Pain Location/Orientation Orientation: Left;Location: Back;Location: Buttocks;Location: Leg   Pain Onset/Description Onset: Ongoing   Hospital Pain Intervention(s) Repositioned;Ambulation/increased activity   Restrictions/Precautions   Weight Bearing Precautions Per Order No   Other Precautions Chair Alarm;Bed Alarm;Multiple lines;O2;Fall Risk;Pain  (+3L O2 via NC)   Home Living   Type of Home House   Home Layout Multi-level;Able to live on main level with bedroom/bathroom;Performs ADLs on one level;Stairs to enter with rails  (5 HERB)   Bathroom Shower/Tub Walk-in shower   Bathroom Toilet Raised   Bathroom Equipment Commode   Bathroom Accessibility Accessible   Home Equipment Walker   Additional Comments Pt ambulates without an AD.   Prior Function   Level of [place] Independent with functional mobility;Independent with ADLs;Needs assistance with IADLS   Lives With Family   Receives Help From Family   IADLs Family/Friend/Other provides meals;Independent with  "medication management;Independent with driving   Falls in the last 6 months 1 to 4  (\"I fell in the shower.\")   Vocational On disability   General   Family/Caregiver Present No   Cognition   Overall Cognitive Status WFL   Arousal/Participation Alert   Attention Within functional limits   Orientation Level Oriented X4   Memory Within functional limits   Following Commands Follows all commands and directions without difficulty   Comments Pt agreeable to PT.   Subjective   Subjective \"I'm uncomfortable in the chair.\"   RLE Assessment   RLE Assessment X   Strength RLE   RLE Overall Strength 3+/5   LLE Assessment   LLE Assessment X   Strength LLE   LLE Overall Strength 3+/5   Light Touch   RLE Light Touch Impaired   RLE Light Touch Comments neuropathy   LLE Light Touch Impaired   LLE Light Touch Comments neuropathy   Bed Mobility   Sit to Supine 4  Minimal assistance   Additional items Assist x 2;Bedrails;Increased time required;Verbal cues;LE management   Transfers   Sit to Stand 4  Minimal assistance   Additional items Assist x 2;Increased time required;Verbal cues;Armrests   Stand to Sit 4  Minimal assistance   Additional items Assist x 2;Increased time required;Verbal cues   Ambulation/Elevation   Gait pattern Decreased toe off;Decreased heel strike;Decreased hip extension;Excessively slow;Step to;Short stride;Shuffling   Gait Assistance 4  Minimal assist   Additional items Assist x 2;Verbal cues   Assistive Device Bariatric Rolling walker   Distance 3 feet, bed to chair   Balance   Static Sitting Fair   Dynamic Sitting Fair -   Static Standing Poor +   Dynamic Standing Poor   Ambulatory Poor   Endurance Deficit   Endurance Deficit Yes   Endurance Deficit Description decreased activity tolerance; pt requiring supplemental oxygen; pt was received on 3L O2 via NC   Activity Tolerance   Activity Tolerance Patient limited by fatigue;Patient limited by pain   Medical Staff Made Aware MATEO Pagan  (Co-evaluation performed with " OT secondary to complex medical condition of patient and regression of functional status from baseline. PT/OT goals were addressed separately.)   Assessment   Prognosis Good   Problem List Decreased strength;Decreased endurance;Impaired balance;Decreased mobility;Impaired sensation;Obesity;Pain   Assessment Pt is 53 year old female seen for PT evaluation s/p admit to [facility] on 2/17/2024 with Cellulitis of right lower extremity. PT consulted to assess pt's functional mobility and discharge needs. Order placed for PT evaluation and treatment, with up and out of bed as tolerated order. Comorbidities affecting pt's physical performance at time of assessment include class 3 severe obesity, anemia, acute respiratory failure with hypoxia, asymptomatic HIV infection, obesity hypoventilation syndrome, and ambulatory dysfunction. Prior to hospitalization, pt was independent with all functional mobility without an AD. Pt ambulates household distances. Pt resides with her family, in a multi-level house with five steps to enter. Personal factors affecting pt at time of initial evaluation include lives in a multi-level house, stairs to enter home, ambulating with an assistive device, inability to ambulate household distances, inability to navigate level surfaces without external assistance, unable to perform dynamic tasks in the community, limited home support, positive fall history, difficulty performing ADLs, and inability to perform IADLs. Please find objective findings from PT assessment regarding body systems outlined above with impairments and limitations including weakness, impaired balance, decreased endurance, gait deviations, pain, decreased activity tolerance, decreased functional mobility tolerance, altered sensation, and fall risk. The following objective measures were performed on initial evaluation Barthel Index: 45/100, Modified Manjeet: 4 (moderate/severe disability), and AM-PAC 6-Clicks: 11/24. Pt's  clinical presentation is currently unstable/unpredictable seen in pt's presentation of need for ongoing medical management/monitoring, pt is a fall risk, pt requires use of RW for safe ambulation, pt is currently requiring supplemental oxygen, and pt requires cues and assist for safety with functional mobility. Pt to benefit from continued PT treatment to address deficits as defined above and maximize pt's level of function and independence with mobility. From a PT standpoint, recommendation at time of discharge would be level 1, maximum resource intensity in order to facilitate return to prior level of function.   Barriers to Discharge Inaccessible home environment;Decreased caregiver support   Goals   STG Expiration Date 03/02/24   Short Term Goal #1 In 10 days: Increase bilateral LE strength 1/2 grade to facilitate independent mobility, Perform all bed mobility tasks modified independent to decrease caregiver burden, Perform all transfers modified independent to improve independence, Ambulate > 150 ft. with least restrictive assistive device modified independent w/o LOB and w/ normalized gait pattern 100% of the time, Navigate 5 stairs with CG assist with unilateral handrail to facilitate return to previous living environment, and Increase all balance 1/2 grade to decrease risk for falls   Plan   Treatment/Interventions Functional transfer training;LE strengthening/ROM;Elevations;Therapeutic exercise;Endurance training;Patient/family training;Bed mobility;Gait training;OT   PT Frequency 3-5x/wk   Discharge Recommendation   Rehab Resource Intensity Level, PT I (Maximum Resource Intensity)   AM-PAC Basic Mobility Inpatient   Turning in Flat Bed Without Bedrails 2   Lying on Back to Sitting on Edge of Flat Bed Without Bedrails 2   Moving Bed to Chair 2   Standing Up From Chair Using Arms 2   Walk in Room 2   Climb 3-5 Stairs With Railing 1   Basic Mobility Inpatient Raw Score 11   Basic Mobility Standardized Score  30.25   Highest Level Of Mobility   -[facility] Goal 4: Move to chair/commode   -[facility] Achieved 4: Move to chair/commode   Modified [place] Scale   Modified [place] Scale 4   Barthel Index   Feeding 10   Bathing 0   Grooming Score 0   Dressing Score 5   Bladder Score 10   Bowels Score 10   Toilet Use Score 5   Transfers (Bed/Chair) Score 5   Mobility (Level Surface) Score 0   Stairs Score 0   Barthel Index Score 45     PT Evaluation Time: 2635-9438  Nancy Tang, PT, DPT

## 2024-02-21 NOTE — PLAN OF CARE
Problem: PAIN - ADULT  Goal: Verbalizes/displays adequate comfort level or baseline comfort level  Description: Interventions:  - Encourage patient to monitor pain and request assistance  - Assess pain using appropriate pain scale  - Administer analgesics based on type and severity of pain and evaluate response  - Implement non-pharmacological measures as appropriate and evaluate response  - Consider cultural and social influences on pain and pain management  - Notify physician/advanced practitioner if interventions unsuccessful or patient reports new pain  Outcome: Progressing     Problem: INFECTION - ADULT  Goal: Absence or prevention of progression during hospitalization  Description: INTERVENTIONS:  - Assess and monitor for signs and symptoms of infection  - Monitor lab/diagnostic results  - Monitor all insertion sites, i.e. indwelling lines, tubes, and drains  - Monitor endotracheal if appropriate and nasal secretions for changes in amount and color  - [place] appropriate cooling/warming therapies per order  - Administer medications as ordered  - Instruct and encourage patient and family to use good hand hygiene technique  - Identify and instruct in appropriate isolation precautions for identified infection/condition  Outcome: Progressing     Problem: SAFETY ADULT  Goal: Maintain or return to baseline ADL function  Description: INTERVENTIONS:  -  Assess patient's ability to carry out ADLs; assess patient's baseline for ADL function and identify physical deficits which impact ability to perform ADLs (bathing, care of mouth/teeth, toileting, grooming, dressing, etc.)  - Assess/evaluate cause of self-care deficits   - Assess range of motion  - Assess patient's mobility; develop plan if impaired  - Assess patient's need for assistive devices and provide as appropriate  - Encourage maximum independence but intervene and supervise when necessary  - Involve family in performance of ADLs  - Assess for home care  needs following discharge   - Consider OT consult to assist with ADL evaluation and planning for discharge  - Provide patient education as appropriate  Outcome: Progressing  Note: Requiring encouragement to transition OOB and to ambulate to the bathroom to retain functions and performance of ADLs.

## 2024-02-21 NOTE — PROGRESS NOTES
[facility]  Progress Note  Name: Robyn Lyons I  MRN: [id number]  Unit/Bed#: MS Martino01 I Date of Admission: 2/17/2024   Date of Service: 2/21/2024 I Hospital Day: 4    Assessment/Plan   * Cellulitis of right lower extremity  Assessment & Plan  Slight improvement today  Still warm and tender but improving per patient report  Erythema is demarcated with a marker and currently stable from yesterday  Continue IV antibiotics for now, patient does have likely chronic venous stasis changes as well  Antibiotics started on 2/17, continue vancomycin because of purulent drainage reported on initial presentation      Ambulatory dysfunction  Assessment & Plan  Due to morbid obesity  Not a candidate for acute rehab  Patient considering short term rehab    Asymptomatic HIV infection, with no history of HIV-related illness (HCC)  Assessment & Plan  Continue home [place] [phone]    Acute respiratory failure with hypoxia (HCC)  Assessment & Plan  Currently on 3 liters nasal canula  No history of oxygen requirements   Likely in the setting of ANA MARIA and OHS  Facilitate home oxygen testing prior to discharge           VTE Pharmacologic Prophylaxis: VTE Score: 5 High Risk (Score >/= 5) - Pharmacological DVT Prophylaxis Ordered: enoxaparin (Lovenox). Sequential Compression Devices Ordered.    Mobility:   Basic Mobility Inpatient Raw Score: 11  JH-HLM Goal: 4: Move to chair/commode  JH-HLM Achieved: 4: Move to chair/commode  HLM Goal achieved. Continue to encourage appropriate mobility.    Patient Centered Rounds: I performed bedside rounds with nursing staff today.   Discussions with Specialists or Other Care Team Provider: sadi    Education and Discussions with Family / Patient: Patient declined call to .     Total Time Spent on Date of Encounter in care of patient: 30+ mins. This time was spent on one or more of the following: performing physical exam; counseling and coordination of care;  obtaining or reviewing history; documenting in the medical record; reviewing/ordering tests, medications or procedures; communicating with other healthcare professionals and discussing with patient's family/caregivers.    Current Length of Stay: 4 day(s)  Current Patient Status: Inpatient   Certification Statement: The patient will continue to require additional inpatient hospital stay due to IV antibiotics   Discharge Plan: Anticipate discharge in 24-48 hrs to discharge location to be determined pending rehab evaluations.    Code Status: Level 1 - Full Code    Subjective:   Patient feels better today     Objective:     Vitals:   Temp (24hrs), Av.8 °F (37.1 °C), Min:98.5 °F (36.9 °C), Max:99 °F (37.2 °C)    Temp:  [98.5 °F (36.9 °C)-99 °F (37.2 °C)] 98.5 °F (36.9 °C)  HR:  [86-92] 86  Resp:  [18] 18  BP: (124-141)/(61-72) 138/70  SpO2:  [90 %-100 %] 91 %  Body mass index is 69.06 kg/m².     Input and Output Summary (last 24 hours):     Intake/Output Summary (Last 24 hours) at 2024 1402  Last data filed at 2024 1320  Gross per 24 hour   Intake 600 ml   Output 1649 ml   Net -1049 ml       Physical Exam:   Physical Exam  Constitutional:       General: She is not in acute distress.     Appearance: Normal appearance. She is not toxic-appearing.   Cardiovascular:      Rate and Rhythm: Normal rate and regular rhythm.      Heart sounds: Normal heart sounds. No murmur heard.  Pulmonary:      Effort: Pulmonary effort is normal. No respiratory distress.      Breath sounds: Normal breath sounds. No wheezing.   Abdominal:      General: Abdomen is flat. There is no distension.      Palpations: Abdomen is soft.      Tenderness: There is no abdominal tenderness.   Neurological:      General: No focal deficit present.      Mental Status: She is alert and oriented to person, place, and time. Mental status is at baseline.      Motor: No weakness.          Additional Data:     Labs:  Results from last 7 days   Lab Units  02/20/24  0454   WBC Thousand/uL 3.85*   HEMOGLOBIN g/dL 9.9*   HEMATOCRIT % 33.3*   PLATELETS Thousands/uL 244   NEUTROS PCT % 44   LYMPHS PCT % 36   MONOS PCT % 14*   EOS PCT % 5     Results from last 7 days   Lab Units 02/20/24  0454 02/19/24  0524 02/18/24  0550   SODIUM mmol/L 141   < > 139   POTASSIUM mmol/L 4.0   < > 3.9   CHLORIDE mmol/L 106   < > 107   CO2 mmol/L 28   < > 24   BUN mg/dL 8   < > 11   CREATININE mg/dL 0.85   < > 0.73   ANION GAP mmol/L 7   < > 8   CALCIUM mg/dL 8.7   < > 9.1   ALBUMIN g/dL  --   --  3.7   TOTAL BILIRUBIN mg/dL  --   --  0.46   ALK PHOS U/L  --   --  44   ALT U/L  --   --  33   AST U/L  --   --  26   GLUCOSE RANDOM mg/dL 91   < > 99    < > = values in this interval not displayed.                 Results from last 7 days   Lab Units 02/20/24  0454 02/18/24 0106 02/17/24 2050   LACTIC ACID mmol/L  --  1.7 2.4*   PROCALCITONIN ng/ml 0.06  --   --        Lines/Drains:  Invasive Devices       Peripheral Intravenous Line  Duration             Peripheral IV 02/18/24 Distal;Dorsal (posterior);Right Forearm 2 days                          Imaging: No pertinent imaging reviewed.    Recent Cultures (last 7 days):   Results from last 7 days   Lab Units 02/17/24 2059 02/17/24 2050   BLOOD CULTURE  No Growth at 48 hrs. No Growth at 48 hrs.       Last 24 Hours Medication List:   Current Facility-Administered Medications   Medication Dose Route Frequency Provider Last Rate    acetaminophen  650 mg Oral Q6H PRN Som Ceron, DO      albuterol  2.5 mg Nebulization Q6H PRN LUC Downs      baclofen  20 mg Oral TID LUC Mukherjee      cefTRIAXone  2,000 mg Intravenous Q24H Som Ceron, DO 2,000 mg (02/21/24 1114)    Lzvqm-Btrlv-Danqpfdt-TenofAF  1 tablet Oral Daily Som Ceron, DO      enoxaparin  60 mg Subcutaneous Q12H [facility] Som Ceron DO      gabapentin  100 mg Oral TID Som Ceron DO      hydrALAZINE  10 mg Intravenous Q6H PRN  Som Paramleticiawaran, DO      HYDROmorphone  0.2 mg Intravenous Q4H PRN Som Parameswaran, DO      hydrOXYzine HCL  50 mg Oral Q6H PRN LUC Mukherjee      labetalol  10 mg Intravenous Q6H PRN Som Parameswaran, DO      oxyCODONE  2.5 mg Oral Q4H PRN Som Paramangelaan, DO      Or    oxyCODONE  5 mg Oral Q4H PRN Som Paramangelaan, DO      vancomycin  1,500 mg Intravenous Q12H Cristy Anaya, DO 1,500 mg (02/21/24 1224)        Today, Patient Was Seen By: Eran Andre MD    **Please Note: This note may have been constructed using a voice recognition system.**

## 2024-02-21 NOTE — RESPIRATORY THERAPY NOTE
Patient refusing CPAP at this time. Patient states she was scheduled for sleep study Monday. But does not have a machine at home. Patient remains on nasal cannula without apparent respiratory distress. Will monitor for possible respiratory distress overnight.

## 2024-02-21 NOTE — PLAN OF CARE
Problem: OCCUPATIONAL THERAPY ADULT  Goal: Performs self-care activities at highest level of function for planned discharge setting.  See evaluation for individualized goals.  Description: Treatment Interventions: ADL retraining, Functional transfer training, UE strengthening/ROM, Endurance training, Compensatory technique education, Continued evaluation, Energy conservation, Patient/family training, Equipment evaluation/education  Equipment Recommended: Shower/Tub chair with back ($), Hip Kit ($)       See flowsheet documentation for full assessment, interventions and recommendations.   Note: Limitation: Decreased ADL status, Decreased UE strength, Decreased Safe judgement during ADL, Decreased endurance  Prognosis: Fair  Assessment: Patient participated in Skilled OT session this date with interventions consisting of ADL re training with the use of correct body mechnaics, Energy Conservation techniques, Work simplification skills , safety awareness and fall prevention techniques, therapeutic exercise to: increase functional use of BUEs, increase BUE muscle strength ,  therapeutic activities to: increase activity tolerance, increase cardiovascular endurance , increase dynamic sit/ stand balance during functional activity , and increase OOB/ sitting tolerance . Patient agreeable to OT treatment session, upon arrival patient was found seated OOB to Recliner, alert, and responsive .  Patient requires mod assist for UB ADLs, max to total assist for LB ADLs. Patient requiring verbal cues for safety, verbal cues for correct technique, and frequent rest periods. Patient continues to be functioning below baseline level, occupational performance remains limited secondary to factors listed above and increased risk for falls and injury.   From OT standpoint, recommendation at time of d/c would be Level 1 Rehab.   Patient to benefit from continued Occupational Therapy treatment while in the hospital to address deficits as  defined above and maximize level of functional independence with ADLs and functional mobility.     Rehab Resource Intensity Level, OT: I (Maximum Resource Intensity)

## 2024-02-21 NOTE — PROGRESS NOTES
Robyn Lyons is a 53 y.o. female who is currently ordered Vancomycin IV with management by the Pharmacy Consult service.  Relevant clinical data and objective / subjective history reviewed.  Vancomycin Assessment:  Indication and Goal AUC/Trough: Soft tissue (goal -600, trough >10)  Clinical Status: stable  Micro:     Renal Function:  SCr: 0.85 mg/dL from  2/20/24  CrCl: 141.1 mL/min from 2/20/24   Renal replacement: Not on dialysis  Days of Therapy: 5  Current Dose: 1500mg IV q12h  Vancomycin Plan:  New Dosing: continue 1500mg IV q12h  Estimated AUC: 484 mcg*hr/mL  Estimated Trough: 11.9 mcg/mL  Next Level: 2/26/24 AM level   Renal Function Monitoring: Daily BMP and UOP  Pharmacy will continue to follow closely for s/sx of nephrotoxicity, infusion reactions and appropriateness of therapy.  BMP and CBC will be ordered per protocol. We will continue to follow the patient’s culture results and clinical progress daily.    Vielka Hernandez, Pharmacist

## 2024-02-21 NOTE — RESPIRATORY THERAPY NOTE
"RT Protocol Note  Robyn Lyons 53 y.o. female MRN: [id number]  Unit/Bed#: -01 Encounter: [id number]    Assessment    Principal Problem:    Cellulitis of right lower extremity  Active Problems:    Class 3 severe obesity due to excess calories with serious comorbidity and body mass index (BMI) of 60.0 to 69.9 in adult (HCC)    Anemia    Acute respiratory failure with hypoxia (HCC)    Asymptomatic HIV infection, with no history of HIV-related illness (HCC)    Obesity hypoventilation syndrome (HCC)    Ambulatory dysfunction  Physical Exam:   Assessment Type: Assess only  General Appearance: Sleeping  Respiratory Pattern: Dyspnea with exertion  Chest Assessment: Chest expansion symmetrical  Bilateral Breath Sounds: Diminished  O2 Device: nc    Vitals:  Blood pressure 138/70, pulse 85, temperature 98.5 °F (36.9 °C), resp. rate 19, height 5' 7\" (1.702 m), weight (!) 200 kg (440 lb 14.7 oz), SpO2 98%.      O2 Device: nc     Plan    Respiratory Plan: No distress/Pulmonary history        Resp Comments: will cont w current PRN txs, no changes   "

## 2024-02-21 NOTE — ASSESSMENT & PLAN NOTE
Currently on 3 liters nasal canula  No history of oxygen requirements   Likely in the setting of ANA MARIA and OHS  Facilitate home oxygen testing prior to discharge

## 2024-02-21 NOTE — OCCUPATIONAL THERAPY NOTE
Occupational Therapy Treatment Note        Patient Name: Robyn Lyons  Today's Date: 2/21/2024 02/21/24 1055   OT Last Visit   OT Visit Date 02/21/24   Note Type   Note Type Treatment   Pain Assessment   Pain Assessment Tool 0-10   Pain Score 10 - Worst Possible Pain   Pain Location/Orientation Orientation: Left;Location: Back;Location: Buttocks;Location: Leg   Pain Onset/Description Onset: Ongoing   Hospital Pain Intervention(s) Repositioned;Ambulation/increased activity   Restrictions/Precautions   Weight Bearing Precautions Per Order No   Other Precautions Chair Alarm;Bed Alarm;Multiple lines;O2;Fall Risk;Pain  (+3L O2 via NC)   ADL   Where Assessed Edge of bed   Eating Assistance 5  Supervision/Setup   Eating Deficit Setup;Increased time to complete   Grooming Assistance 4  Minimal Assistance   Grooming Deficit Setup;Supervision/safety;Increased time to complete   UB Bathing Assistance 3  Moderate Assistance   UB Bathing Deficit Setup;Supervision/safety;Increased time to complete   LB Bathing Assistance 2  Maximal Assistance  (total assist for lower leg and foot)   LB Bathing Deficit Increased time to complete;Right lower leg including foot;Left lower leg including foot;Use of adaptive equipment   UB Dressing Assistance 3  Moderate Assistance   UB Dressing Deficit Setup;Verbal cueing;Supervision/safety;Increased time to complete   LB Dressing Assistance 2  Maximal Assistance  (total assist for lower leg and foot)   LB Dressing Deficit Setup;Requires assistive device for steadying   Toileting Assistance  2  Maximal Assistance   Toileting Deficit Setup;Increased time to complete;Clothing management up;Clothing management down;Perineal hygiene;Use of adaptive equipment   Functional Standing Tolerance   Time ~ 1 minute   Activity transfer training/ BUE support on RW/ min assist of 2   Bed Mobility   Sit to Supine 4  Minimal assistance   Additional items Assist x 2;Bedrails;Increased time  required;Verbal cues;LE management   Transfers   Sit to Stand 4  Minimal assistance   Additional items Assist x 2;Increased time required;Verbal cues;Armrests   Stand to Sit 4  Minimal assistance   Additional items Assist x 2;Increased time required;Verbal cues   Functional Mobility   Functional Mobility 4  Minimal assistance   Additional Comments assist of 2 with RW   Additional items Rolling walker   Therapeutic Exercise - ROM   UE-ROM Yes   ROM- Right Upper Extremities   R Shoulder AROM;Flexion;ABduction;Extension;Horizontal ABduction   R Elbow AROM;Elbow flexion;Elbow extension   R Weight/Reps/Sets 2 sets of 10 repetitions   ROM - Left Upper Extremities    L Shoulder AROM;Flexion;ABduction;Extension;Horizontal ABduction   L Elbow AROM;Elbow flexion;Elbow extension   L Weight/Reps/Sets 2 sets of 10 repetitions   Cognition   Overall Cognitive Status WFL   Arousal/Participation Alert;Responsive;Cooperative   Attention Within functional limits   Orientation Level Oriented X4   Memory Within functional limits   Following Commands Follows all commands and directions without difficulty   Activity Tolerance   Activity Tolerance Patient limited by fatigue;Patient limited by pain   Medical Staff Made Aware Patient required assist of 2 skilled therapists to facilitate neuromuscular components of movement, provide weight bearing assistance to increase self performance in transitional movements and achieve functional sitting/ standing activities.   Assessment   Assessment Patient participated in Skilled OT session this date with interventions consisting of ADL re training with the use of correct body mechnaics, Energy Conservation techniques, Work simplification skills , safety awareness and fall prevention techniques, therapeutic exercise to: increase functional use of BUEs, increase BUE muscle strength ,  therapeutic activities to: increase activity tolerance, increase cardiovascular endurance , increase dynamic sit/ stand  balance during functional activity , and increase OOB/ sitting tolerance . Patient agreeable to OT treatment session, upon arrival patient was found seated OOB to Recliner, alert, and responsive .  Patient requires mod assist for UB ADLs, max to total assist for LB ADLs. Patient requiring verbal cues for safety, verbal cues for correct technique, and frequent rest periods. Patient continues to be functioning below baseline level, occupational performance remains limited secondary to factors listed above and increased risk for falls and injury.   From OT standpoint, recommendation at time of d/c would be Level 1 Rehab.   Patient to benefit from continued Occupational Therapy treatment while in the hospital to address deficits as defined above and maximize level of functional independence with ADLs and functional mobility.   Plan   Treatment Interventions ADL retraining;Functional transfer training;UE strengthening/ROM;Endurance training;Compensatory technique education;Continued evaluation;Energy conservation;Patient/family training;Equipment evaluation/education   Goal Expiration Date 03/03/24   OT Treatment Day 1   OT Frequency 3-5x/wk   Discharge Recommendation   Rehab Resource Intensity Level, OT I (Maximum Resource Intensity)   AM-PAC Daily Activity Inpatient   Lower Body Dressing 2   Bathing 2   Toileting 2   Upper Body Dressing 2   Grooming 3   Eating 4   Daily Activity Raw Score 15   Daily Activity Standardized Score (Calc for Raw Score >=11) 34.69   AM-PAC Applied Cognition Inpatient   Following a Speech/Presentation 4   Understanding Ordinary Conversation 4   Taking Medications 4   Remembering Where Things Are Placed or Put Away 4   Remembering List of 4-5 Errands 4   Taking Care of Complicated Tasks 4   Applied Cognition Raw Score 24   Applied Cognition Standardized Score 62.21

## 2024-02-21 NOTE — PLAN OF CARE
Problem: PHYSICAL THERAPY ADULT  Goal: Performs mobility at highest level of function for planned discharge setting.  See evaluation for individualized goals.  Description: Treatment/Interventions: Functional transfer training, LE strengthening/ROM, Elevations, Therapeutic exercise, Endurance training, Patient/family training, Bed mobility, Gait training, OT          See flowsheet documentation for full assessment, interventions and recommendations.  Note: Prognosis: Good  Problem List: Decreased strength, Decreased endurance, Impaired balance, Decreased mobility, Impaired sensation, Obesity, Pain  Assessment: Pt is 53 year old female seen for PT evaluation s/p admit to [facility] on 2/17/2024 with Cellulitis of right lower extremity. PT consulted to assess pt's functional mobility and discharge needs. Order placed for PT evaluation and treatment, with up and out of bed as tolerated order. Comorbidities affecting pt's physical performance at time of assessment include class 3 severe obesity, anemia, acute respiratory failure with hypoxia, asymptomatic HIV infection, obesity hypoventilation syndrome, and ambulatory dysfunction. Prior to hospitalization, pt was independent with all functional mobility without an AD. Pt ambulates household distances. Pt resides with her family, in a multi-level house with five steps to enter. Personal factors affecting pt at time of initial evaluation include lives in a multi-level house, stairs to enter home, ambulating with an assistive device, inability to ambulate household distances, inability to navigate level surfaces without external assistance, unable to perform dynamic tasks in the community, limited home support, positive fall history, difficulty performing ADLs, and inability to perform IADLs. Please find objective findings from PT assessment regarding body systems outlined above with impairments and limitations including weakness, impaired balance, decreased  endurance, gait deviations, pain, decreased activity tolerance, decreased functional mobility tolerance, altered sensation, and fall risk. The following objective measures were performed on initial evaluation Barthel Index: 45/100, Modified Wharton: 4 (moderate/severe disability), and AM-PAC 6-Clicks: 11/24. Pt's clinical presentation is currently unstable/unpredictable seen in pt's presentation of need for ongoing medical management/monitoring, pt is a fall risk, pt requires use of RW for safe ambulation, pt is currently requiring supplemental oxygen, and pt requires cues and assist for safety with functional mobility. Pt to benefit from continued PT treatment to address deficits as defined above and maximize pt's level of function and independence with mobility. From a PT standpoint, recommendation at time of discharge would be level 1, maximum resource intensity in order to facilitate return to prior level of function.    Barriers to Discharge: Inaccessible home environment, Decreased caregiver support     Rehab Resource Intensity Level, PT: I (Maximum Resource Intensity)    See flowsheet documentation for full assessment.

## 2024-02-21 NOTE — PLAN OF CARE
Problem: INFECTION - ADULT  Goal: Absence or prevention of progression during hospitalization  Description: INTERVENTIONS:  - Assess and monitor for signs and symptoms of infection  - Monitor lab/diagnostic results  - Monitor all insertion sites, i.e. indwelling lines, tubes, and drains  - Monitor endotracheal if appropriate and nasal secretions for changes in amount and color  - Lincoln appropriate cooling/warming therapies per order  - Administer medications as ordered  - Instruct and encourage patient and family to use good hand hygiene technique  - Identify and instruct in appropriate isolation precautions for identified infection/condition  Outcome: Progressing

## 2024-02-21 NOTE — ASSESSMENT & PLAN NOTE
Slight improvement today  Still warm and tender but improving per patient report  Erythema is demarcated with a marker and currently stable from yesterday  Continue IV antibiotics for now, patient does have likely chronic venous stasis changes as well  Antibiotics started on 2/17, continue vancomycin because of purulent drainage reported on initial presentation

## 2024-02-22 LAB
2HR DELTA HS TROPONIN: 0 NG/L
4HR DELTA HS TROPONIN: 2 NG/L
ANION GAP SERPL CALCULATED.3IONS-SCNC: 6 MMOL/L
ATRIAL RATE: 79 BPM
ATRIAL RATE: 81 BPM
BASOPHILS # BLD AUTO: 0.02 THOUSANDS/ÂΜL (ref 0–0.1)
BASOPHILS NFR BLD AUTO: 1 % (ref 0–1)
BUN SERPL-MCNC: 10 MG/DL (ref 5–25)
CALCIUM SERPL-MCNC: 9 MG/DL (ref 8.4–10.2)
CARDIAC TROPONIN I PNL SERPL HS: 2 NG/L
CARDIAC TROPONIN I PNL SERPL HS: 2 NG/L
CARDIAC TROPONIN I PNL SERPL HS: 4 NG/L
CHLORIDE SERPL-SCNC: 104 MMOL/L (ref 96–108)
CO2 SERPL-SCNC: 30 MMOL/L (ref 21–32)
CREAT SERPL-MCNC: 0.74 MG/DL (ref 0.6–1.3)
EOSINOPHIL # BLD AUTO: 0.29 THOUSAND/ÂΜL (ref 0–0.61)
EOSINOPHIL NFR BLD AUTO: 8 % (ref 0–6)
ERYTHROCYTE [DISTWIDTH] IN BLOOD BY AUTOMATED COUNT: 18 % (ref 11.6–15.1)
GFR SERPL CREATININE-BSD FRML MDRD: 92 ML/MIN/1.73SQ M
GLUCOSE SERPL-MCNC: 97 MG/DL (ref 65–140)
HCT VFR BLD AUTO: 34.4 % (ref 34.8–46.1)
HGB BLD-MCNC: 9.9 G/DL (ref 11.5–15.4)
IMM GRANULOCYTES # BLD AUTO: 0.02 THOUSAND/UL (ref 0–0.2)
IMM GRANULOCYTES NFR BLD AUTO: 1 % (ref 0–2)
LYMPHOCYTES # BLD AUTO: 1.19 THOUSANDS/ÂΜL (ref 0.6–4.47)
LYMPHOCYTES NFR BLD AUTO: 33 % (ref 14–44)
MAGNESIUM SERPL-MCNC: 2 MG/DL (ref 1.9–2.7)
MCH RBC QN AUTO: 24.3 PG (ref 26.8–34.3)
MCHC RBC AUTO-ENTMCNC: 28.8 G/DL (ref 31.4–37.4)
MCV RBC AUTO: 84 FL (ref 82–98)
MONOCYTES # BLD AUTO: 0.45 THOUSAND/ÂΜL (ref 0.17–1.22)
MONOCYTES NFR BLD AUTO: 12 % (ref 4–12)
NEUTROPHILS # BLD AUTO: 1.67 THOUSANDS/ÂΜL (ref 1.85–7.62)
NEUTS SEG NFR BLD AUTO: 45 % (ref 43–75)
NRBC BLD AUTO-RTO: 0 /100 WBCS
P AXIS: 42 DEGREES
P AXIS: 47 DEGREES
PLATELET # BLD AUTO: 276 THOUSANDS/UL (ref 149–390)
PMV BLD AUTO: 9.1 FL (ref 8.9–12.7)
POTASSIUM SERPL-SCNC: 4.3 MMOL/L (ref 3.5–5.3)
PR INTERVAL: 190 MS
PR INTERVAL: 194 MS
QRS AXIS: 50 DEGREES
QRS AXIS: 55 DEGREES
QRSD INTERVAL: 88 MS
QRSD INTERVAL: 88 MS
QT INTERVAL: 408 MS
QT INTERVAL: 418 MS
QTC INTERVAL: 467 MS
QTC INTERVAL: 485 MS
RBC # BLD AUTO: 4.08 MILLION/UL (ref 3.81–5.12)
SODIUM SERPL-SCNC: 140 MMOL/L (ref 135–147)
T WAVE AXIS: 42 DEGREES
T WAVE AXIS: 44 DEGREES
VENTRICULAR RATE: 79 BPM
VENTRICULAR RATE: 81 BPM
WBC # BLD AUTO: 3.64 THOUSAND/UL (ref 4.31–10.16)

## 2024-02-22 PROCEDURE — 85025 COMPLETE CBC W/AUTO DIFF WBC: CPT | Performed by: STUDENT IN AN ORGANIZED HEALTH CARE EDUCATION/TRAINING PROGRAM

## 2024-02-22 PROCEDURE — 83735 ASSAY OF MAGNESIUM: CPT | Performed by: STUDENT IN AN ORGANIZED HEALTH CARE EDUCATION/TRAINING PROGRAM

## 2024-02-22 PROCEDURE — 94761 N-INVAS EAR/PLS OXIMETRY MLT: CPT

## 2024-02-22 PROCEDURE — 94640 AIRWAY INHALATION TREATMENT: CPT

## 2024-02-22 PROCEDURE — 99233 SBSQ HOSP IP/OBS HIGH 50: CPT | Performed by: STUDENT IN AN ORGANIZED HEALTH CARE EDUCATION/TRAINING PROGRAM

## 2024-02-22 PROCEDURE — 93005 ELECTROCARDIOGRAM TRACING: CPT

## 2024-02-22 PROCEDURE — 80048 BASIC METABOLIC PNL TOTAL CA: CPT | Performed by: STUDENT IN AN ORGANIZED HEALTH CARE EDUCATION/TRAINING PROGRAM

## 2024-02-22 PROCEDURE — 94664 DEMO&/EVAL PT USE INHALER: CPT

## 2024-02-22 PROCEDURE — 93010 ELECTROCARDIOGRAM REPORT: CPT | Performed by: INTERNAL MEDICINE

## 2024-02-22 PROCEDURE — 94760 N-INVAS EAR/PLS OXIMETRY 1: CPT

## 2024-02-22 PROCEDURE — 84484 ASSAY OF TROPONIN QUANT: CPT

## 2024-02-22 RX ORDER — OXYCODONE HYDROCHLORIDE 10 MG/1
30 TABLET ORAL EVERY 6 HOURS PRN
Status: DISCONTINUED | OUTPATIENT
Start: 2024-02-22 | End: 2024-02-27 | Stop reason: HOSPADM

## 2024-02-22 RX ORDER — CEPHALEXIN 500 MG/1
500 CAPSULE ORAL EVERY 6 HOURS SCHEDULED
Status: DISCONTINUED | OUTPATIENT
Start: 2024-02-22 | End: 2024-02-26

## 2024-02-22 RX ADMIN — GABAPENTIN 100 MG: 100 CAPSULE ORAL at 15:55

## 2024-02-22 RX ADMIN — CEPHALEXIN 500 MG: 500 CAPSULE ORAL at 18:18

## 2024-02-22 RX ADMIN — Medication 2 G: at 18:19

## 2024-02-22 RX ADMIN — BACLOFEN 20 MG: 10 TABLET ORAL at 21:09

## 2024-02-22 RX ADMIN — OXYCODONE HYDROCHLORIDE 5 MG: 5 TABLET ORAL at 01:55

## 2024-02-22 RX ADMIN — HYDROXYZINE HYDROCHLORIDE 50 MG: 25 TABLET ORAL at 21:09

## 2024-02-22 RX ADMIN — CEPHALEXIN 500 MG: 500 CAPSULE ORAL at 23:22

## 2024-02-22 RX ADMIN — ENOXAPARIN SODIUM 60 MG: 60 INJECTION SUBCUTANEOUS at 21:09

## 2024-02-22 RX ADMIN — OXYCODONE HYDROCHLORIDE 30 MG: 10 TABLET ORAL at 12:04

## 2024-02-22 RX ADMIN — OXYCODONE HYDROCHLORIDE 30 MG: 10 TABLET ORAL at 23:20

## 2024-02-22 RX ADMIN — BACLOFEN 20 MG: 10 TABLET ORAL at 08:22

## 2024-02-22 RX ADMIN — OXYCODONE HYDROCHLORIDE 30 MG: 10 TABLET ORAL at 18:23

## 2024-02-22 RX ADMIN — HYDROMORPHONE HYDROCHLORIDE 0.2 MG: 0.2 INJECTION, SOLUTION INTRAMUSCULAR; INTRAVENOUS; SUBCUTANEOUS at 10:05

## 2024-02-22 RX ADMIN — Medication 2 G: at 21:10

## 2024-02-22 RX ADMIN — HYDROXYZINE HYDROCHLORIDE 50 MG: 25 TABLET ORAL at 14:22

## 2024-02-22 RX ADMIN — CEPHALEXIN 500 MG: 500 CAPSULE ORAL at 12:04

## 2024-02-22 RX ADMIN — ALBUTEROL SULFATE 2.5 MG: 2.5 SOLUTION RESPIRATORY (INHALATION) at 22:52

## 2024-02-22 RX ADMIN — HYDROMORPHONE HYDROCHLORIDE 0.2 MG: 0.2 INJECTION, SOLUTION INTRAMUSCULAR; INTRAVENOUS; SUBCUTANEOUS at 04:17

## 2024-02-22 RX ADMIN — DARUNAVIR, COBICISTAT, EMTRICITABINE, AND TENOFOVIR ALAFENAMIDE 1 TABLET: 800; 150; 200; 10 TABLET, FILM COATED ORAL at 15:55

## 2024-02-22 RX ADMIN — OXYCODONE HYDROCHLORIDE 5 MG: 5 TABLET ORAL at 08:22

## 2024-02-22 RX ADMIN — Medication 2 G: at 15:55

## 2024-02-22 RX ADMIN — GABAPENTIN 100 MG: 100 CAPSULE ORAL at 08:23

## 2024-02-22 RX ADMIN — ENOXAPARIN SODIUM 60 MG: 60 INJECTION SUBCUTANEOUS at 08:23

## 2024-02-22 RX ADMIN — GABAPENTIN 100 MG: 100 CAPSULE ORAL at 21:08

## 2024-02-22 RX ADMIN — BACLOFEN 20 MG: 10 TABLET ORAL at 15:55

## 2024-02-22 NOTE — RESPIRATORY THERAPY NOTE
Home Oxygen Qualifying Test     Patient name: Robyn Lyons        : 1970   Date of Test:  2024  Diagnosis:    Home Oxygen Test:    **Medicare Guidelines require item(s) 1-5 on all ambulatory patients or 1 and 2 on non-ambulatory patients.    1. Baseline SPO2 on Room Air at rest 85 %   If <= 88% on Room Air add O2 via NC to obtain SpO2 >=88%. If LPM needed, document LPM 4 needed to reach =>88%    SPO2 during exertion on Room Air N/A  %  During exertion monitor SPO2. If SPO2 increases >=89%, do not add supplemental oxygen    SPO2 on Oxygen at Rest 94 % at 4 LPM    SPO2 during exertion on Oxygen 91 % at 4 LPM    Test performed during exertion activity.      [x]  Supplemental Home Oxygen is indicated.    []  Client does not qualify for home oxygen.    Respiratory Additional Notes- Patient was unable to walk, but was able to stand with assistance of walker and nursing.     Michelle David, RT

## 2024-02-22 NOTE — NURSING NOTE
This nurse did hourly rounding on the patient.  Patient stated that they are having chest pain and SOB.  Vitals obtained and Slim notified. EKG done.

## 2024-02-22 NOTE — NURSING NOTE
At initiation of test for oxygen dependence, patient was on 4L of supplemental oxygen via nasal cannula with saturations of 96%.  Patient ordered ambulatory pulse oximetry but was limited in movement due to pain.  Oxygen was removed to assess for desaturation of oxygen while at rest.  Oxygen was left off for 1 hour.  Patient was reassessed at 85% on room after the 1 hour with signs of dyspnea.  Oxygen was replaced on 4L Nasal Cannula and recovered to 94% with improved dyspnea after 5 minutes.

## 2024-02-22 NOTE — ASSESSMENT & PLAN NOTE
Recent Labs     02/20/24  0454 02/22/24  0600   HGB 9.9* 9.9*   MCV 83 84       Stable Hb  Cont to monitor

## 2024-02-22 NOTE — PROGRESS NOTES
[facility]  Progress Note  Name: Robyn Lyons I  MRN: [id number]  Unit/Bed#: [facility]-01 I Date of Admission: 2/17/2024   Date of Service: 2/22/2024 I Hospital Day: 5    Assessment/Plan   * Cellulitis of right lower extremity  Assessment & Plan  Significant improvement since initiation of antibiotics  Does have baseline erythema from chronic venous stasis dermatitis  But overall warmth and tenderness has resolved  Transition antibiotics to PO today from IV  Antibiotics started on 2/17, continue vancomycin because of purulent drainage reported on initial presentation      Asymptomatic HIV infection, with no history of HIV-related illness (HCC)  Assessment & Plan  Continue home [place] [phone]    Acute respiratory failure with hypoxia (HCC)  Assessment & Plan  Currently on 3 liters nasal canula  No history of oxygen requirements   Likely in the setting of ANA MARIA and OHS  Facilitate home oxygen testing prior to discharge    Anemia  Assessment & Plan  Recent Labs     02/20/24  0454 02/22/24  0600   HGB 9.9* 9.9*   MCV 83 84       Stable Hb  Cont to monitor                 VTE Pharmacologic Prophylaxis: VTE Score: 5 High Risk (Score >/= 5) - Pharmacological DVT Prophylaxis Ordered: enoxaparin (Lovenox). Sequential Compression Devices Ordered.    Mobility:   Basic Mobility Inpatient Raw Score: 11  -HLM Goal: 4: Move to chair/commode  -HLM Achieved: 1: Laying in bed  HLM Goal achieved. Continue to encourage appropriate mobility.    Patient Centered Rounds: I performed bedside rounds with nursing staff today.   Discussions with Specialists or Other Care Team Provider: sadi    Education and Discussions with Family / Patient: Patient declined call to .     Total Time Spent on Date of Encounter in care of patient: 30+ mins. This time was spent on one or more of the following: performing physical exam; counseling and coordination of care; obtaining or reviewing history;  documenting in the medical record; reviewing/ordering tests, medications or procedures; communicating with other healthcare professionals and discussing with patient's family/caregivers.    Current Length of Stay: 5 day(s)  Current Patient Status: Inpatient   Certification Statement: The patient will continue to require additional inpatient hospital stay due to home o2 evaluation  Discharge Plan: Anticipate discharge tomorrow to home.    Code Status: Level 1 - Full Code    Subjective:   Patient feels better today     Objective:     Vitals:   Temp (24hrs), Av.1 °F (37.3 °C), Min:98.6 °F (37 °C), Max:99.7 °F (37.6 °C)    Temp:  [98.6 °F (37 °C)-99.7 °F (37.6 °C)] 99.1 °F (37.3 °C)  HR:  [81-90] 86  Resp:  [19] 19  BP: (122-137)/(67-87) 122/70  SpO2:  [95 %-99 %] 95 %  Body mass index is 69.06 kg/m².     Input and Output Summary (last 24 hours):     Intake/Output Summary (Last 24 hours) at 2024 1200  Last data filed at 2024 0900  Gross per 24 hour   Intake 600 ml   Output 818 ml   Net -218 ml       Physical Exam:   Physical Exam  Constitutional:       General: She is not in acute distress.     Appearance: Normal appearance. She is not toxic-appearing.   Cardiovascular:      Rate and Rhythm: Normal rate and regular rhythm.      Heart sounds: Normal heart sounds. No murmur heard.  Pulmonary:      Effort: Pulmonary effort is normal. No respiratory distress.      Breath sounds: Normal breath sounds. No wheezing.   Abdominal:      General: Abdomen is flat. There is no distension.      Palpations: Abdomen is soft.      Tenderness: There is no abdominal tenderness.   Neurological:      General: No focal deficit present.      Mental Status: She is alert and oriented to person, place, and time. Mental status is at baseline.      Motor: No weakness.          Additional Data:     Labs:  Results from last 7 days   Lab Units 24  0600   WBC Thousand/uL 3.64*   HEMOGLOBIN g/dL 9.9*   HEMATOCRIT % 34.4*   PLATELETS  Thousands/uL 276   NEUTROS PCT % 45   LYMPHS PCT % 33   MONOS PCT % 12   EOS PCT % 8*     Results from last 7 days   Lab Units 02/22/24  0600 02/19/24  0524 02/18/24  0550   SODIUM mmol/L 140   < > 139   POTASSIUM mmol/L 4.3   < > 3.9   CHLORIDE mmol/L 104   < > 107   CO2 mmol/L 30   < > 24   BUN mg/dL 10   < > 11   CREATININE mg/dL 0.74   < > 0.73   ANION GAP mmol/L 6   < > 8   CALCIUM mg/dL 9.0   < > 9.1   ALBUMIN g/dL  --   --  3.7   TOTAL BILIRUBIN mg/dL  --   --  0.46   ALK PHOS U/L  --   --  44   ALT U/L  --   --  33   AST U/L  --   --  26   GLUCOSE RANDOM mg/dL 97   < > 99    < > = values in this interval not displayed.                 Results from last 7 days   Lab Units 02/20/24  0454 02/18/24  0106 02/17/24 2050   LACTIC ACID mmol/L  --  1.7 2.4*   PROCALCITONIN ng/ml 0.06  --   --        Lines/Drains:  Invasive Devices       Peripheral Intravenous Line  Duration             Peripheral IV 02/18/24 Distal;Dorsal (posterior);Right Forearm 3 days              Drain  Duration             External Urinary Catheter <1 day                          Imaging: No pertinent imaging reviewed.    Recent Cultures (last 7 days):   Results from last 7 days   Lab Units 02/17/24 2059 02/17/24 2050   BLOOD CULTURE  No Growth at 72 hrs. No Growth at 72 hrs.       Last 24 Hours Medication List:   Current Facility-Administered Medications   Medication Dose Route Frequency Provider Last Rate    acetaminophen  650 mg Oral Q6H PRN Som Ceron, DO      albuterol  2.5 mg Nebulization Q6H PRN LUC Downs      baclofen  20 mg Oral TID LUC Mukherjee      cephalexin  500 mg Oral Q6H [facility] MD [facility]-Cobic-Emtricit-TenofAF  1 tablet Oral Daily Som Ceron,       enoxaparin  60 mg Subcutaneous Q12H [facility] Som Ceron, DO      gabapentin  100 mg Oral TID Som Ceron, DO      hydrALAZINE  10 mg Intravenous Q6H PRN Som Ceron,       hydrOXYzine HCL  50 mg Oral  Q6H PRN LUC Mukherjee      labetalol  10 mg Intravenous Q6H PRN Som Ceron DO      oxyCODONE  30 mg Oral Q6H PRN Eran Benedict MD          Today, Patient Was Seen By: Eran Andre MD    **Please Note: This note may have been constructed using a voice recognition system.**

## 2024-02-22 NOTE — ASSESSMENT & PLAN NOTE
Significant improvement since initiation of antibiotics  Does have baseline erythema from chronic venous stasis dermatitis  But overall warmth and tenderness has resolved  Transition antibiotics to PO today from IV  Antibiotics started on 2/17, continue vancomycin because of purulent drainage reported on initial presentation

## 2024-02-22 NOTE — CONSULTS
Vancomycin IV Pharmacy-to-Dose Consultation     Vancomycin has been discontinued.  Pharmacy will sign off.  Please contact or re-consult with questions.    Ludmila Arenas, Pharmacist

## 2024-02-22 NOTE — PROGRESS NOTES
Robyn Lyons is a 53 y.o. female who is currently ordered Vancomycin IV with management by the Pharmacy Consult service.  Relevant clinical data and objective / subjective history reviewed.  Vancomycin Assessment:  Indication and Goal AUC/Trough: Soft tissue (goal -600, trough >10)  Clinical Status: stable  Micro:   No new results  Renal Function:  SCr: 0.74 mg/dL  CrCl: 162.1 mL/min  Renal replacement: Not on dialysis  Days of Therapy: 6  Current Dose: 1500mg IV q12h  Vancomycin Plan:  New Dosing: continue current dose  Estimated AUC: 423 mcg*hr/mL  Estimated Trough: 9.6 mcg/mL  Next Level: 2/26/24  Renal Function Monitoring: Daily BMP and UOP  Pharmacy will continue to follow closely for s/sx of nephrotoxicity, infusion reactions and appropriateness of therapy.  BMP and CBC will be ordered per protocol. We will continue to follow the patient’s culture results and clinical progress daily.    Ludmila Arenas, Pharmacist

## 2024-02-22 NOTE — ASSESSMENT & PLAN NOTE
Currently on 3 liters nasal canula  No history of oxygen requirements   Likely in the setting of ANA MARIA and OHS  Facilitate home oxygen testing prior to discharge   Complex Repair Preamble Text (Leave Blank If You Do Not Want): Extensive wide undermining was performed.

## 2024-02-23 PROBLEM — F41.9 ANXIETY: Status: ACTIVE | Noted: 2022-05-09

## 2024-02-23 PROBLEM — I87.2 VENOUS INSUFFICIENCY: Status: ACTIVE | Noted: 2022-12-09

## 2024-02-23 PROBLEM — E78.00 HYPERCHOLESTEROLEMIA: Status: ACTIVE | Noted: 2022-05-09

## 2024-02-23 PROBLEM — Z98.84 HISTORY OF ROUX-EN-Y GASTRIC BYPASS: Status: ACTIVE | Noted: 2020-11-20

## 2024-02-23 PROBLEM — K21.9 GASTROESOPHAGEAL REFLUX DISEASE: Status: ACTIVE | Noted: 2022-05-09

## 2024-02-23 PROBLEM — R60.0 LOWER LEG EDEMA: Status: ACTIVE | Noted: 2022-05-09

## 2024-02-23 PROBLEM — E55.9 VITAMIN D DEFICIENCY: Status: ACTIVE | Noted: 2022-05-09

## 2024-02-23 LAB
ANION GAP SERPL CALCULATED.3IONS-SCNC: 6 MMOL/L
BACTERIA BLD CULT: NORMAL
BACTERIA BLD CULT: NORMAL
BASOPHILS # BLD AUTO: 0.03 THOUSANDS/ÂΜL (ref 0–0.1)
BASOPHILS NFR BLD AUTO: 1 % (ref 0–1)
BUN SERPL-MCNC: 16 MG/DL (ref 5–25)
CALCIUM SERPL-MCNC: 9.7 MG/DL (ref 8.4–10.2)
CARDIAC TROPONIN I PNL SERPL HS: 5 NG/L (ref 8–18)
CHLORIDE SERPL-SCNC: 101 MMOL/L (ref 96–108)
CO2 SERPL-SCNC: 32 MMOL/L (ref 21–32)
CREAT SERPL-MCNC: 1.04 MG/DL (ref 0.6–1.3)
DME PARACHUTE DELIVERY DATE ACTUAL: NORMAL
DME PARACHUTE DELIVERY DATE ACTUAL: NORMAL
DME PARACHUTE DELIVERY DATE EXPECTED: NORMAL
DME PARACHUTE DELIVERY DATE REQUESTED: NORMAL
DME PARACHUTE DELIVERY DATE REQUESTED: NORMAL
DME PARACHUTE ITEM DESCRIPTION: NORMAL
DME PARACHUTE ORDER STATUS: NORMAL
DME PARACHUTE ORDER STATUS: NORMAL
DME PARACHUTE SUPPLIER NAME: NORMAL
DME PARACHUTE SUPPLIER NAME: NORMAL
DME PARACHUTE SUPPLIER PHONE: NORMAL
DME PARACHUTE SUPPLIER PHONE: NORMAL
EOSINOPHIL # BLD AUTO: 0.28 THOUSAND/ÂΜL (ref 0–0.61)
EOSINOPHIL NFR BLD AUTO: 5 % (ref 0–6)
ERYTHROCYTE [DISTWIDTH] IN BLOOD BY AUTOMATED COUNT: 17.7 % (ref 11.6–15.1)
GFR SERPL CREATININE-BSD FRML MDRD: 61 ML/MIN/1.73SQ M
GLUCOSE SERPL-MCNC: 119 MG/DL (ref 65–140)
HCT VFR BLD AUTO: 38.7 % (ref 34.8–46.1)
HGB BLD-MCNC: 11.2 G/DL (ref 11.5–15.4)
IMM GRANULOCYTES # BLD AUTO: 0.03 THOUSAND/UL (ref 0–0.2)
IMM GRANULOCYTES NFR BLD AUTO: 1 % (ref 0–2)
LYMPHOCYTES # BLD AUTO: 1.6 THOUSANDS/ÂΜL (ref 0.6–4.47)
LYMPHOCYTES NFR BLD AUTO: 29 % (ref 14–44)
MAGNESIUM SERPL-MCNC: 2.1 MG/DL (ref 1.9–2.7)
MCH RBC QN AUTO: 24.7 PG (ref 26.8–34.3)
MCHC RBC AUTO-ENTMCNC: 28.9 G/DL (ref 31.4–37.4)
MCV RBC AUTO: 85 FL (ref 82–98)
MONOCYTES # BLD AUTO: 0.66 THOUSAND/ÂΜL (ref 0.17–1.22)
MONOCYTES NFR BLD AUTO: 12 % (ref 4–12)
NEUTROPHILS # BLD AUTO: 2.98 THOUSANDS/ÂΜL (ref 1.85–7.62)
NEUTS SEG NFR BLD AUTO: 52 % (ref 43–75)
NRBC BLD AUTO-RTO: 0 /100 WBCS
PLATELET # BLD AUTO: 332 THOUSANDS/UL (ref 149–390)
PMV BLD AUTO: 9.2 FL (ref 8.9–12.7)
POTASSIUM SERPL-SCNC: 4.3 MMOL/L (ref 3.5–5.3)
RBC # BLD AUTO: 4.53 MILLION/UL (ref 3.81–5.12)
SODIUM SERPL-SCNC: 139 MMOL/L (ref 135–147)
WBC # BLD AUTO: 5.58 THOUSAND/UL (ref 4.31–10.16)

## 2024-02-23 PROCEDURE — 84484 ASSAY OF TROPONIN QUANT: CPT | Performed by: STUDENT IN AN ORGANIZED HEALTH CARE EDUCATION/TRAINING PROGRAM

## 2024-02-23 PROCEDURE — 83735 ASSAY OF MAGNESIUM: CPT | Performed by: STUDENT IN AN ORGANIZED HEALTH CARE EDUCATION/TRAINING PROGRAM

## 2024-02-23 PROCEDURE — 94760 N-INVAS EAR/PLS OXIMETRY 1: CPT

## 2024-02-23 PROCEDURE — 99232 SBSQ HOSP IP/OBS MODERATE 35: CPT | Performed by: STUDENT IN AN ORGANIZED HEALTH CARE EDUCATION/TRAINING PROGRAM

## 2024-02-23 PROCEDURE — 80048 BASIC METABOLIC PNL TOTAL CA: CPT | Performed by: STUDENT IN AN ORGANIZED HEALTH CARE EDUCATION/TRAINING PROGRAM

## 2024-02-23 PROCEDURE — 94640 AIRWAY INHALATION TREATMENT: CPT

## 2024-02-23 PROCEDURE — 85025 COMPLETE CBC W/AUTO DIFF WBC: CPT | Performed by: STUDENT IN AN ORGANIZED HEALTH CARE EDUCATION/TRAINING PROGRAM

## 2024-02-23 RX ORDER — ONDANSETRON 2 MG/ML
4 INJECTION INTRAMUSCULAR; INTRAVENOUS EVERY 6 HOURS PRN
Status: DISCONTINUED | OUTPATIENT
Start: 2024-02-23 | End: 2024-02-27 | Stop reason: HOSPADM

## 2024-02-23 RX ORDER — CEPHALEXIN 500 MG/1
500 CAPSULE ORAL EVERY 6 HOURS SCHEDULED
Qty: 24 CAPSULE | Refills: 0 | Status: SHIPPED | OUTPATIENT
Start: 2024-02-23 | End: 2024-02-27

## 2024-02-23 RX ADMIN — HYDROXYZINE HYDROCHLORIDE 50 MG: 25 TABLET ORAL at 13:14

## 2024-02-23 RX ADMIN — HYDROXYZINE HYDROCHLORIDE 50 MG: 25 TABLET ORAL at 02:48

## 2024-02-23 RX ADMIN — BACLOFEN 20 MG: 10 TABLET ORAL at 08:46

## 2024-02-23 RX ADMIN — Medication 2 G: at 13:14

## 2024-02-23 RX ADMIN — CEPHALEXIN 500 MG: 500 CAPSULE ORAL at 23:44

## 2024-02-23 RX ADMIN — Medication 2 G: at 18:38

## 2024-02-23 RX ADMIN — Medication 2 G: at 08:48

## 2024-02-23 RX ADMIN — GABAPENTIN 100 MG: 100 CAPSULE ORAL at 18:36

## 2024-02-23 RX ADMIN — ENOXAPARIN SODIUM 60 MG: 60 INJECTION SUBCUTANEOUS at 21:16

## 2024-02-23 RX ADMIN — ACETAMINOPHEN 650 MG: 325 TABLET, FILM COATED ORAL at 21:15

## 2024-02-23 RX ADMIN — ALBUTEROL SULFATE 2.5 MG: 2.5 SOLUTION RESPIRATORY (INHALATION) at 20:32

## 2024-02-23 RX ADMIN — BACLOFEN 20 MG: 10 TABLET ORAL at 21:15

## 2024-02-23 RX ADMIN — ENOXAPARIN SODIUM 60 MG: 60 INJECTION SUBCUTANEOUS at 08:46

## 2024-02-23 RX ADMIN — CEPHALEXIN 500 MG: 500 CAPSULE ORAL at 13:14

## 2024-02-23 RX ADMIN — GABAPENTIN 100 MG: 100 CAPSULE ORAL at 08:47

## 2024-02-23 RX ADMIN — OXYCODONE HYDROCHLORIDE 30 MG: 10 TABLET ORAL at 05:51

## 2024-02-23 RX ADMIN — OXYCODONE HYDROCHLORIDE 30 MG: 10 TABLET ORAL at 13:27

## 2024-02-23 RX ADMIN — BACLOFEN 20 MG: 10 TABLET ORAL at 18:36

## 2024-02-23 RX ADMIN — CEPHALEXIN 500 MG: 500 CAPSULE ORAL at 18:36

## 2024-02-23 RX ADMIN — ACETAMINOPHEN 650 MG: 325 TABLET, FILM COATED ORAL at 02:48

## 2024-02-23 RX ADMIN — Medication 2 G: at 21:17

## 2024-02-23 RX ADMIN — DARUNAVIR, COBICISTAT, EMTRICITABINE, AND TENOFOVIR ALAFENAMIDE 1 TABLET: 800; 150; 200; 10 TABLET, FILM COATED ORAL at 13:14

## 2024-02-23 RX ADMIN — ONDANSETRON 4 MG: 2 INJECTION INTRAMUSCULAR; INTRAVENOUS at 21:15

## 2024-02-23 RX ADMIN — CEPHALEXIN 500 MG: 500 CAPSULE ORAL at 05:46

## 2024-02-23 RX ADMIN — GABAPENTIN 100 MG: 100 CAPSULE ORAL at 21:16

## 2024-02-23 NOTE — CASE MANAGEMENT
Case Management Discharge Planning Note    Patient name Robyn Lyons  Location /-01 MRN [id number]  : 1970 Date 2024       Current Admission Date: 2024  Current Admission Diagnosis:Cellulitis of right lower extremity   Patient Active Problem List    Diagnosis Date Noted    Cellulitis of right lower extremity 2024    Class 3 severe obesity due to excess calories with serious comorbidity and body mass index (BMI) of 60.0 to 69.9 in adult (HCC) 2024    Anemia 2024    Acute respiratory failure with hypoxia ([facility]) 2024    Asymptomatic HIV infection, with no history of HIV-related illness ([facility]) 2024    Obesity hypoventilation syndrome ([facility]) 2024    Ambulatory dysfunction 2024    Venous insufficiency 2022    Anxiety 2022    Gastroesophageal reflux disease 2022    Hypercholesterolemia 2022    Lower leg edema 2022    Vitamin D deficiency 2022    History of Aris-en-Y gastric bypass 2020      LOS (days): 6  Geometric Mean LOS (GMLOS) (days): 4.8  Days to GMLOS:-0.9     OBJECTIVE:  Risk of Unplanned Readmission Score: 9.79         Current admission status: Inpatient   Preferred Pharmacy:   [facility] - [address]  [address]  [address]  [place] [address]  Phone: [phone] Fax: [phone]    Primary Care Provider: No primary care provider on file.    Primary Insurance: [facility] DUAL COMPLETE  REP  Secondary Insurance: [facility]    DISCHARGE DETAILS:    Discharge planning discussed with:: Patient  Freedom of Choice: Yes  Comments - Freedom of Choice: CM discussed freedom of choice as it pertains to discharge planning. Patient is agreeable to rehab after CM, SLIM, pt mother and son all spoke with pt.  CM contacted family/caregiver?: Yes (Son and mother on facetime with pt during conversation.) CM informed pt that only accepting  rehab in [facility] out of more than 100 referrals that were sent. Patient is agreeable to [facility].     Were Treatment Team discharge recommendations reviewed with patient/caregiver?: Yes  Did patient/caregiver verbalize understanding of patient care needs?: Yes  Were patient/caregiver advised of the risks associated with not following Treatment Team discharge recommendations?: Yes    Contacts  Patient Contacts: Peewee Lyons  Relationship to Patient:: Family  Contact Method: Phone  Phone Number: [phone] (family was on pts phone during conversation)    Requested Home Health Care         Is the patient interested in HHC at discharge?: No      Other Referral/Resources/Interventions Provided:  Interventions: SNF  Referral Comments: CM sent referral to snf, CM is awaiting available snf bed.    Would you like to participate in our [facility] service program?  : No - Declined    Treatment Team Recommendation: SNF  Discharge Destination Plan:: SNF  Transport at Discharge : BLS Ambulance     IMM Given (Date):: 02/16/24  IMM Given to:: Patient (CM reviewed IMM with patient at bedside. Patient reported understanding their rights and denied any questions or concerns Patient was given a copy and signed copy was placed in medical records.)    CM submitted for HHA for patient through [facility] program. CM informed patient and family this can take time.

## 2024-02-23 NOTE — PROGRESS NOTES
[facility]  Progress Note  Name: Robyn Lyons I  MRN: [id number]  Unit/Bed#: [facility]-01 I Date of Admission: 2/17/2024   Date of Service: 2/23/2024 I Hospital Day: 6    Assessment/Plan   * Cellulitis of right lower extremity  Assessment & Plan  Significant improvement since initiation of antibiotics  Does have baseline erythema from chronic venous stasis dermatitis  But overall warmth and tenderness has resolved  Transition antibiotics to PO today from IV  Antibiotics started on 2/17, continue vancomycin because of purulent drainage reported on initial presentation  Patient now agreeable to rehab      Acute respiratory failure with hypoxia (HCC)  Assessment & Plan  Currently on 3 liters nasal canula  No history of oxygen requirements   Likely in the setting of ANA MARIA and OHS  Non compliant with cpap  Reports having sleep study pending as an outpatient  Facilitate home oxygen testing prior to discharge               VTE Pharmacologic Prophylaxis: VTE Score: 5 High Risk (Score >/= 5) - Pharmacological DVT Prophylaxis Ordered: enoxaparin (Lovenox). Sequential Compression Devices Ordered.    Mobility:   Basic Mobility Inpatient Raw Score: 12  -HLM Goal: 4: Move to chair/commode  JH-HLM Achieved: 5: Stand (1 or more minutes)  HLM Goal achieved. Continue to encourage appropriate mobility.    Patient Centered Rounds: I performed bedside rounds with nursing staff today.   Discussions with Specialists or Other Care Team Provider: sadi    Education and Discussions with Family / Patient: Updated  (son and mother) via phone.    Total Time Spent on Date of Encounter in care of patient: 30+ mins. This time was spent on one or more of the following: performing physical exam; counseling and coordination of care; obtaining or reviewing history; documenting in the medical record; reviewing/ordering tests, medications or procedures; communicating with other healthcare professionals and  discussing with patient's family/caregivers.    Current Length of Stay: 6 day(s)  Current Patient Status: Inpatient   Certification Statement: The patient will continue to require additional inpatient hospital stay due to Rehab placement  Discharge Plan: Anticipate discharge in 24-48 hrs to rehab facility.    Code Status: Level 1 - Full Code    Subjective:   Patient reported chest pain but troponin negative     Objective:     Vitals:   Temp (24hrs), Av.6 °F (37 °C), Min:98 °F (36.7 °C), Max:99.1 °F (37.3 °C)    Temp:  [98 °F (36.7 °C)-99.1 °F (37.3 °C)] 99.1 °F (37.3 °C)  HR:  [68-91] 91  Resp:  [16] 16  BP: (140-153)/(67-78) 140/69  SpO2:  [93 %-97 %] 93 %  Body mass index is 69.06 kg/m².     Input and Output Summary (last 24 hours):   No intake or output data in the 24 hours ending 24 1556    Physical Exam:   Physical Exam  Constitutional:       General: She is not in acute distress.     Appearance: Normal appearance. She is not toxic-appearing.   Cardiovascular:      Rate and Rhythm: Normal rate and regular rhythm.      Heart sounds: Normal heart sounds. No murmur heard.  Pulmonary:      Effort: Pulmonary effort is normal. No respiratory distress.      Breath sounds: Normal breath sounds. No wheezing.   Abdominal:      General: Abdomen is flat. There is no distension.      Palpations: Abdomen is soft.      Tenderness: There is no abdominal tenderness.   Neurological:      General: No focal deficit present.      Mental Status: She is alert and oriented to person, place, and time. Mental status is at baseline.      Motor: No weakness.          Additional Data:     Labs:  Results from last 7 days   Lab Units 24  0628   WBC Thousand/uL 5.58   HEMOGLOBIN g/dL 11.2*   HEMATOCRIT % 38.7   PLATELETS Thousands/uL 332   NEUTROS PCT % 52   LYMPHS PCT % 29   MONOS PCT % 12   EOS PCT % 5     Results from last 7 days   Lab Units 24  0628 24  0524 24  0550   SODIUM mmol/L 139   < > 139    POTASSIUM mmol/L 4.3   < > 3.9   CHLORIDE mmol/L 101   < > 107   CO2 mmol/L 32   < > 24   BUN mg/dL 16   < > 11   CREATININE mg/dL 1.04   < > 0.73   ANION GAP mmol/L 6   < > 8   CALCIUM mg/dL 9.7   < > 9.1   ALBUMIN g/dL  --   --  3.7   TOTAL BILIRUBIN mg/dL  --   --  0.46   ALK PHOS U/L  --   --  44   ALT U/L  --   --  33   AST U/L  --   --  26   GLUCOSE RANDOM mg/dL 119   < > 99    < > = values in this interval not displayed.                 Results from last 7 days   Lab Units 02/20/24  0454 02/18/24  0106 02/17/24 2050   LACTIC ACID mmol/L  --  1.7 2.4*   PROCALCITONIN ng/ml 0.06  --   --        Lines/Drains:  Invasive Devices       Peripheral Intravenous Line  Duration             Peripheral IV 02/18/24 Distal;Dorsal (posterior);Right Forearm 4 days              Drain  Duration             External Urinary Catheter 1 day                          Imaging: No pertinent imaging reviewed.    Recent Cultures (last 7 days):   Results from last 7 days   Lab Units 02/17/24 2059 02/17/24 2050   BLOOD CULTURE  No Growth After 5 Days. No Growth After 5 Days.       Last 24 Hours Medication List:   Current Facility-Administered Medications   Medication Dose Route Frequency Provider Last Rate    acetaminophen  650 mg Oral Q6H PRN Som Ceron, DO      albuterol  2.5 mg Nebulization Q6H PRN LUC Downs      baclofen  20 mg Oral TID LUC Mukherjee      cephalexin  500 mg Oral Q6H [facility] Eran Benedict MD      Uuxzr-Ohnit-Pkdykmrw-TenofAF  1 tablet Oral Daily Som Ceron, DO      Diclofenac Sodium  2 g Topical 4x Daily Eran Benedict MD      enoxaparin  60 mg Subcutaneous Q12H [facility] Som Ceron, DO      gabapentin  100 mg Oral TID Som Ceron, DO      hydrALAZINE  10 mg Intravenous Q6H PRN Som Ceron, DO      labetalol  10 mg Intravenous Q6H PRN Som Ceron, DO      oxyCODONE  30 mg Oral Q6H PRN Eran Bensonan, MD          Today, Patient Was Seen By:  Eran Andre MD    **Please Note: This note may have been constructed using a voice recognition system.**

## 2024-02-23 NOTE — PLAN OF CARE
Problem: INFECTION - ADULT  Goal: Absence or prevention of progression during hospitalization  Description: INTERVENTIONS:  - Assess and monitor for signs and symptoms of infection  - Monitor lab/diagnostic results  - Monitor all insertion sites, i.e. indwelling lines, tubes, and drains  - Monitor endotracheal if appropriate and nasal secretions for changes in amount and color  - [place] appropriate cooling/warming therapies per order  - Administer medications as ordered  - Instruct and encourage patient and family to use good hand hygiene technique  - Identify and instruct in appropriate isolation precautions for identified infection/condition  Outcome: Progressing     Problem: SAFETY ADULT  Goal: Maintain or return to baseline ADL function  Description: INTERVENTIONS:  -  Assess patient's ability to carry out ADLs; assess patient's baseline for ADL function and identify physical deficits which impact ability to perform ADLs (bathing, care of mouth/teeth, toileting, grooming, dressing, etc.)  - Assess/evaluate cause of self-care deficits   - Assess range of motion  - Assess patient's mobility; develop plan if impaired  - Assess patient's need for assistive devices and provide as appropriate  - Encourage maximum independence but intervene and supervise when necessary  - Involve family in performance of ADLs  - Assess for home care needs following discharge   - Consider OT consult to assist with ADL evaluation and planning for discharge  - Provide patient education as appropriate  Outcome: Progressing     Problem: RESPIRATORY - ADULT  Goal: Achieves optimal ventilation and oxygenation  Description: INTERVENTIONS:  - Assess for changes in respiratory status  - Assess for changes in mentation and behavior  - Position to facilitate oxygenation and minimize respiratory effort  - Oxygen administered by appropriate delivery if ordered  - Initiate smoking cessation education as indicated  - Encourage broncho-pulmonary  hygiene including cough, deep breathe, Incentive Spirometry  - Assess the need for suctioning and aspirate as needed  - Assess and instruct to report SOB or any respiratory difficulty  - Respiratory Therapy support as indicated  Outcome: Progressing

## 2024-02-23 NOTE — ASSESSMENT & PLAN NOTE
Significant improvement since initiation of antibiotics  Does have baseline erythema from chronic venous stasis dermatitis  But overall warmth and tenderness has resolved  Transition antibiotics to PO today from IV  Antibiotics started on 2/17, continue vancomycin because of purulent drainage reported on initial presentation  Patient now agreeable to rehab

## 2024-02-23 NOTE — ASSESSMENT & PLAN NOTE
Currently on 3 liters nasal canula  No history of oxygen requirements   Likely in the setting of ANA MARIA and OHS  Non compliant with cpap  Reports having sleep study pending as an outpatient  Facilitate home oxygen testing prior to discharge

## 2024-02-23 NOTE — PLAN OF CARE
Problem: INFECTION - ADULT  Goal: Absence or prevention of progression during hospitalization  Description: INTERVENTIONS:  - Assess and monitor for signs and symptoms of infection  - Monitor lab/diagnostic results  - Monitor all insertion sites, i.e. indwelling lines, tubes, and drains  - Monitor endotracheal if appropriate and nasal secretions for changes in amount and color  - [place] appropriate cooling/warming therapies per order  - Administer medications as ordered  - Instruct and encourage patient and family to use good hand hygiene technique  - Identify and instruct in appropriate isolation precautions for identified infection/condition  Outcome: Progressing     Problem: PAIN - ADULT  Goal: Verbalizes/displays adequate comfort level or baseline comfort level  Description: Interventions:  - Encourage patient to monitor pain and request assistance  - Assess pain using appropriate pain scale  - Administer analgesics based on type and severity of pain and evaluate response  - Implement non-pharmacological measures as appropriate and evaluate response  - Consider cultural and social influences on pain and pain management  - Notify physician/advanced practitioner if interventions unsuccessful or patient reports new pain  Outcome: Progressing     Problem: SAFETY ADULT  Goal: Patient will remain free of falls  Description: INTERVENTIONS:  - Educate patient/family on patient safety including physical limitations  - Instruct patient to call for assistance with activity   - Consult OT/PT to assist with strengthening/mobility   - Keep Call bell within reach  - Keep bed low and locked with side rails adjusted as appropriate  - Keep care items and personal belongings within reach  - Initiate and maintain comfort rounds  - Make Fall Risk Sign visible to staff  - Offer Toileting every 2 Hours, in advance of need  - Initiate/Maintain bed alarm  - Obtain necessary fall risk management equipment:   - Apply yellow socks and  bracelet for high fall risk patients  - Consider moving patient to room near nurses station  Outcome: Progressing

## 2024-02-23 NOTE — QUICK NOTE
Since discharge order patient has been reporting that she does not feel comfortable going home. She reported chest pain which appears to be chronic based on nursing supervisor report. She has not reported any chest pain over last 3-4 days. Will order stat troponin.

## 2024-02-23 NOTE — PLAN OF CARE
Problem: PAIN - ADULT  Goal: Verbalizes/displays adequate comfort level or baseline comfort level  Description: Interventions:  - Encourage patient to monitor pain and request assistance  - Assess pain using appropriate pain scale  - Administer analgesics based on type and severity of pain and evaluate response  - Implement non-pharmacological measures as appropriate and evaluate response  - Consider cultural and social influences on pain and pain management  - Notify physician/advanced practitioner if interventions unsuccessful or patient reports new pain  2/23/2024 1229 by Liliane Lopez  Outcome: Adequate for Discharge  2/23/2024 1115 by Liliane Lopez  Outcome: Progressing     Problem: INFECTION - ADULT  Goal: Absence or prevention of progression during hospitalization  Description: INTERVENTIONS:  - Assess and monitor for signs and symptoms of infection  - Monitor lab/diagnostic results  - Monitor all insertion sites, i.e. indwelling lines, tubes, and drains  - Monitor endotracheal if appropriate and nasal secretions for changes in amount and color  - [place] appropriate cooling/warming therapies per order  - Administer medications as ordered  - Instruct and encourage patient and family to use good hand hygiene technique  - Identify and instruct in appropriate isolation precautions for identified infection/condition  2/23/2024 1229 by Liliane Lopez  Outcome: Adequate for Discharge  2/23/2024 1115 by Liliane Lopez  Outcome: Progressing  Goal: Absence of fever/infection during neutropenic period  Description: INTERVENTIONS:  - Monitor WBC    2/23/2024 1229 by Liliane Lopez  Outcome: Adequate for Discharge  2/23/2024 1115 by Liliane Lopez  Outcome: Progressing     Problem: SAFETY ADULT  Goal: Patient will remain free of falls  Description: INTERVENTIONS:  - Educate patient/family on patient safety including physical limitations  - Instruct patient to call for assistance with activity   - Consult OT/PT to  assist with strengthening/mobility   - Keep Call bell within reach  - Keep bed low and locked with side rails adjusted as appropriate  - Keep care items and personal belongings within reach  - Initiate and maintain comfort rounds  - Make Fall Risk Sign visible to staff  - Offer Toileting every 2 Hours, in advance of need  - Initiate/Maintain bed alarm  - Obtain necessary fall risk management equipment:   - Apply yellow socks and bracelet for high fall risk patients  - Consider moving patient to room near nurses station  2/23/2024 1229 by Liliane Lopez  Outcome: Adequate for Discharge  2/23/2024 1115 by Liliane Lopez  Outcome: Progressing  Goal: Maintain or return to baseline ADL function  Description: INTERVENTIONS:  -  Assess patient's ability to carry out ADLs; assess patient's baseline for ADL function and identify physical deficits which impact ability to perform ADLs (bathing, care of mouth/teeth, toileting, grooming, dressing, etc.)  - Assess/evaluate cause of self-care deficits   - Assess range of motion  - Assess patient's mobility; develop plan if impaired  - Assess patient's need for assistive devices and provide as appropriate  - Encourage maximum independence but intervene and supervise when necessary  - Involve family in performance of ADLs  - Assess for home care needs following discharge   - Consider OT consult to assist with ADL evaluation and planning for discharge  - Provide patient education as appropriate  2/23/2024 1229 by Liliane Lopez  Outcome: Adequate for Discharge  2/23/2024 1115 by Liliane Lopez  Outcome: Progressing  Goal: Maintains/Returns to pre admission functional level  Description: INTERVENTIONS:  - Perform AM-PAC 6 Click Basic Mobility/ Daily Activity assessment daily.  - Set and communicate daily mobility goal to care team and patient/family/caregiver.   - Collaborate with rehabilitation services on mobility goals if consulted  - Perform Range of Motion 2 times a day.  -  Reposition patient every 2 hours.  - Dangle patient 3 times a day  - Stand patient 3 times a day  - Ambulate patient 3 times a day  - Out of bed to chair 3 times a day   - Out of bed for meals 3 times a day  - Out of bed for toileting  - Record patient progress and toleration of activity level   2/23/2024 1229 by Liliane Lopez  Outcome: Adequate for Discharge  2/23/2024 1115 by Liliane Lopez  Outcome: Progressing     Problem: RESPIRATORY - ADULT  Goal: Achieves optimal ventilation and oxygenation  Description: INTERVENTIONS:  - Assess for changes in respiratory status  - Assess for changes in mentation and behavior  - Position to facilitate oxygenation and minimize respiratory effort  - Oxygen administered by appropriate delivery if ordered  - Initiate smoking cessation education as indicated  - Encourage broncho-pulmonary hygiene including cough, deep breathe, Incentive Spirometry  - Assess the need for suctioning and aspirate as needed  - Assess and instruct to report SOB or any respiratory difficulty  - Respiratory Therapy support as indicated  2/23/2024 1229 by Liliane Lopez  Outcome: Adequate for Discharge  2/23/2024 1115 by Liliane Lopez  Outcome: Progressing     Problem: Prexisting or High Potential for Compromised Skin Integrity  Goal: Skin integrity is maintained or improved  Description: INTERVENTIONS:  - Identify patients at risk for skin breakdown  - Assess and monitor skin integrity  - Assess and monitor nutrition and hydration status  - Monitor labs   - Assess for incontinence   - Turn and reposition patient  - Assist with mobility/ambulation  - Relieve pressure over bony prominences  - Avoid friction and shearing  - Provide appropriate hygiene as needed including keeping skin clean and dry  - Evaluate need for skin moisturizer/barrier cream  - Collaborate with interdisciplinary team   - Patient/family teaching  - Consider wound care consult   2/23/2024 1229 by Liliane Lopez  Outcome: Adequate for  Discharge  2/23/2024 1115 by Liliane Lopez  Outcome: Progressing     Problem: PHYSICAL THERAPY ADULT  Goal: Performs mobility at highest level of function for planned discharge setting.  See evaluation for individualized goals.  Description: Treatment/Interventions: Functional transfer training, LE strengthening/ROM, Elevations, Therapeutic exercise, Endurance training, Patient/family training, Bed mobility, Gait training, OT          See flowsheet documentation for full assessment, interventions and recommendations.  Outcome: Adequate for Discharge

## 2024-02-24 LAB
ANION GAP SERPL CALCULATED.3IONS-SCNC: 6 MMOL/L
BASOPHILS # BLD AUTO: 0.04 THOUSANDS/ÂΜL (ref 0–0.1)
BASOPHILS NFR BLD AUTO: 1 % (ref 0–1)
BUN SERPL-MCNC: 14 MG/DL (ref 5–25)
CALCIUM SERPL-MCNC: 9.4 MG/DL (ref 8.4–10.2)
CHLORIDE SERPL-SCNC: 100 MMOL/L (ref 96–108)
CO2 SERPL-SCNC: 33 MMOL/L (ref 21–32)
CREAT SERPL-MCNC: 0.8 MG/DL (ref 0.6–1.3)
EOSINOPHIL # BLD AUTO: 0.15 THOUSAND/ÂΜL (ref 0–0.61)
EOSINOPHIL NFR BLD AUTO: 2 % (ref 0–6)
ERYTHROCYTE [DISTWIDTH] IN BLOOD BY AUTOMATED COUNT: 17.8 % (ref 11.6–15.1)
GFR SERPL CREATININE-BSD FRML MDRD: 84 ML/MIN/1.73SQ M
GLUCOSE SERPL-MCNC: 146 MG/DL (ref 65–140)
HCT VFR BLD AUTO: 38.1 % (ref 34.8–46.1)
HGB BLD-MCNC: 10.7 G/DL (ref 11.5–15.4)
IMM GRANULOCYTES # BLD AUTO: 0.04 THOUSAND/UL (ref 0–0.2)
IMM GRANULOCYTES NFR BLD AUTO: 1 % (ref 0–2)
LYMPHOCYTES # BLD AUTO: 1.29 THOUSANDS/ÂΜL (ref 0.6–4.47)
LYMPHOCYTES NFR BLD AUTO: 17 % (ref 14–44)
MAGNESIUM SERPL-MCNC: 2 MG/DL (ref 1.9–2.7)
MCH RBC QN AUTO: 24.2 PG (ref 26.8–34.3)
MCHC RBC AUTO-ENTMCNC: 28.1 G/DL (ref 31.4–37.4)
MCV RBC AUTO: 86 FL (ref 82–98)
MONOCYTES # BLD AUTO: 0.77 THOUSAND/ÂΜL (ref 0.17–1.22)
MONOCYTES NFR BLD AUTO: 10 % (ref 4–12)
NEUTROPHILS # BLD AUTO: 5.13 THOUSANDS/ÂΜL (ref 1.85–7.62)
NEUTS SEG NFR BLD AUTO: 69 % (ref 43–75)
NRBC BLD AUTO-RTO: 0 /100 WBCS
PLATELET # BLD AUTO: 303 THOUSANDS/UL (ref 149–390)
PMV BLD AUTO: 9.3 FL (ref 8.9–12.7)
POTASSIUM SERPL-SCNC: 4.4 MMOL/L (ref 3.5–5.3)
RBC # BLD AUTO: 4.42 MILLION/UL (ref 3.81–5.12)
SODIUM SERPL-SCNC: 139 MMOL/L (ref 135–147)
WBC # BLD AUTO: 7.42 THOUSAND/UL (ref 4.31–10.16)

## 2024-02-24 PROCEDURE — 80048 BASIC METABOLIC PNL TOTAL CA: CPT | Performed by: STUDENT IN AN ORGANIZED HEALTH CARE EDUCATION/TRAINING PROGRAM

## 2024-02-24 PROCEDURE — 83735 ASSAY OF MAGNESIUM: CPT | Performed by: STUDENT IN AN ORGANIZED HEALTH CARE EDUCATION/TRAINING PROGRAM

## 2024-02-24 PROCEDURE — 85025 COMPLETE CBC W/AUTO DIFF WBC: CPT | Performed by: STUDENT IN AN ORGANIZED HEALTH CARE EDUCATION/TRAINING PROGRAM

## 2024-02-24 PROCEDURE — 99232 SBSQ HOSP IP/OBS MODERATE 35: CPT | Performed by: STUDENT IN AN ORGANIZED HEALTH CARE EDUCATION/TRAINING PROGRAM

## 2024-02-24 RX ADMIN — BACLOFEN 20 MG: 10 TABLET ORAL at 21:35

## 2024-02-24 RX ADMIN — Medication 2 G: at 17:03

## 2024-02-24 RX ADMIN — ACETAMINOPHEN 650 MG: 325 TABLET, FILM COATED ORAL at 17:02

## 2024-02-24 RX ADMIN — CEPHALEXIN 500 MG: 500 CAPSULE ORAL at 23:44

## 2024-02-24 RX ADMIN — ACETAMINOPHEN 650 MG: 325 TABLET, FILM COATED ORAL at 23:44

## 2024-02-24 RX ADMIN — CEPHALEXIN 500 MG: 500 CAPSULE ORAL at 05:55

## 2024-02-24 RX ADMIN — BACLOFEN 20 MG: 10 TABLET ORAL at 16:55

## 2024-02-24 RX ADMIN — Medication 2 G: at 14:11

## 2024-02-24 RX ADMIN — CEPHALEXIN 500 MG: 500 CAPSULE ORAL at 17:06

## 2024-02-24 RX ADMIN — Medication 2 G: at 21:36

## 2024-02-24 RX ADMIN — GABAPENTIN 100 MG: 100 CAPSULE ORAL at 16:56

## 2024-02-24 RX ADMIN — GABAPENTIN 100 MG: 100 CAPSULE ORAL at 21:35

## 2024-02-24 RX ADMIN — CEPHALEXIN 500 MG: 500 CAPSULE ORAL at 14:15

## 2024-02-24 RX ADMIN — ENOXAPARIN SODIUM 60 MG: 60 INJECTION SUBCUTANEOUS at 21:35

## 2024-02-24 RX ADMIN — OXYCODONE HYDROCHLORIDE 30 MG: 10 TABLET ORAL at 04:08

## 2024-02-24 NOTE — PLAN OF CARE
Problem: Nutrition/Hydration-ADULT  Goal: Nutrient/Hydration intake appropriate for improving, restoring or maintaining nutritional needs  Description: Monitor and assess patient's nutrition/hydration status for malnutrition. Collaborate with interdisciplinary team and initiate plan and interventions as ordered.  Monitor patient's weight and dietary intake as ordered or per policy. Utilize nutrition screening tool and intervene as necessary. Determine patient's food preferences and provide high-protein, high-caloric foods as appropriate.     INTERVENTIONS:  - Monitor oral intake, urinary output, labs, and treatment plans  - Assess nutrition and hydration status and recommend course of action  - Evaluate amount of meals eaten  - Assist patient with eating if necessary   - Allow adequate time for meals  - Recommend/ encourage appropriate diets, oral nutritional supplements, and vitamin/mineral supplements  - Order, calculate, and assess calorie counts as needed  - Recommend, monitor, and adjust tube feedings and TPN/PPN based on assessed needs  - Assess need for intravenous fluids  - Provide specific nutrition/hydration education as appropriate  - Include patient/family/caregiver in decisions related to nutrition  Outcome: Progressing     Problem: PAIN - ADULT  Goal: Verbalizes/displays adequate comfort level or baseline comfort level  Description: Interventions:  - Encourage patient to monitor pain and request assistance  - Assess pain using appropriate pain scale  - Administer analgesics based on type and severity of pain and evaluate response  - Implement non-pharmacological measures as appropriate and evaluate response  - Consider cultural and social influences on pain and pain management  - Notify physician/advanced practitioner if interventions unsuccessful or patient reports new pain  Outcome: Progressing

## 2024-02-24 NOTE — PROGRESS NOTES
[facility]  Progress Note  Name: Robyn Lyons I  MRN: [id number]  Unit/Bed#: MS Martino01 I Date of Admission: 2/17/2024   Date of Service: 2/24/2024 I Hospital Day: 7    Assessment/Plan   * Cellulitis of right lower extremity  Assessment & Plan  Significant improvement since initiation of antibiotics  Does have baseline erythema from chronic venous stasis dermatitis  But overall warmth and tenderness has resolved  Transition antibiotics to PO on 2/23  Patient now agreeable to rehab      Obesity hypoventilation syndrome (HCC)  Assessment & Plan  Patient complaining of significant shortness of breath while sleeping  May be attributed to obesity  Trial CPAP HS- she is non compliant with it during hospitalization    Asymptomatic HIV infection, with no history of HIV-related illness (HCC)  Assessment & Plan  Continue home [place] [phone]    Acute respiratory failure with hypoxia (HCC)  Assessment & Plan  Currently on 3 liters nasal canula  No history of oxygen requirements   Likely in the setting of ANA MARIA and OHS  Non compliant with cpap  Reports having sleep study pending as an outpatient  Home oxygen facilitated, currently on 2 liters           VTE Pharmacologic Prophylaxis: VTE Score: 5 High Risk (Score >/= 5) - Pharmacological DVT Prophylaxis Ordered: enoxaparin (Lovenox). Sequential Compression Devices Ordered.    Mobility:   Basic Mobility Inpatient Raw Score: 12  JH-HLM Goal: 4: Move to chair/commode  JH-HLM Achieved: 5: Stand (1 or more minutes)  HLM Goal achieved. Continue to encourage appropriate mobility.    Patient Centered Rounds: I performed bedside rounds with nursing staff today.   Discussions with Specialists or Other Care Team Provider: sadi    Education and Discussions with Family / Patient: Patient declined call to .     Total Time Spent on Date of Encounter in care of patient: 30+ mins. This time was spent on one or more of the following: performing  physical exam; counseling and coordination of care; obtaining or reviewing history; documenting in the medical record; reviewing/ordering tests, medications or procedures; communicating with other healthcare professionals and discussing with patient's family/caregivers.    Current Length of Stay: 7 day(s)  Current Patient Status: Inpatient   Certification Statement: The patient will continue to require additional inpatient hospital stay due to placement to rehab  Discharge Plan: Anticipate discharge in 48 hrs to rehab facility.    Code Status: Level 1 - Full Code    Subjective:   Patient feels about the same as yesterday     Objective:     Vitals:   Temp (24hrs), Av.6 °F (37.6 °C), Min:98.9 °F (37.2 °C), Max:100.4 °F (38 °C)    Temp:  [98.9 °F (37.2 °C)-100.4 °F (38 °C)] 98.9 °F (37.2 °C)  HR:  [] 98  Resp:  [16] 16  BP: (138-140)/(58-63) 140/61  SpO2:  [92 %-99 %] 96 %  Body mass index is 69.06 kg/m².     Input and Output Summary (last 24 hours):     Intake/Output Summary (Last 24 hours) at 2024 1239  Last data filed at 2024 0930  Gross per 24 hour   Intake 120 ml   Output 650 ml   Net -530 ml       Physical Exam:   Physical Exam  Constitutional:       General: She is not in acute distress.     Appearance: Normal appearance. She is not toxic-appearing.   Cardiovascular:      Rate and Rhythm: Normal rate and regular rhythm.      Heart sounds: Normal heart sounds. No murmur heard.  Pulmonary:      Effort: Pulmonary effort is normal. No respiratory distress.      Breath sounds: Normal breath sounds. No wheezing.   Abdominal:      General: Abdomen is flat. There is no distension.      Palpations: Abdomen is soft.      Tenderness: There is no abdominal tenderness.   Neurological:      General: No focal deficit present.      Mental Status: She is alert and oriented to person, place, and time. Mental status is at baseline.      Motor: No weakness.          Additional Data:     Labs:  Results from last  7 days   Lab Units 02/24/24  0554   WBC Thousand/uL 7.42   HEMOGLOBIN g/dL 10.7*   HEMATOCRIT % 38.1   PLATELETS Thousands/uL 303   NEUTROS PCT % 69   LYMPHS PCT % 17   MONOS PCT % 10   EOS PCT % 2     Results from last 7 days   Lab Units 02/24/24  0554 02/19/24  0524 02/18/24  0550   SODIUM mmol/L 139   < > 139   POTASSIUM mmol/L 4.4   < > 3.9   CHLORIDE mmol/L 100   < > 107   CO2 mmol/L 33*   < > 24   BUN mg/dL 14   < > 11   CREATININE mg/dL 0.80   < > 0.73   ANION GAP mmol/L 6   < > 8   CALCIUM mg/dL 9.4   < > 9.1   ALBUMIN g/dL  --   --  3.7   TOTAL BILIRUBIN mg/dL  --   --  0.46   ALK PHOS U/L  --   --  44   ALT U/L  --   --  33   AST U/L  --   --  26   GLUCOSE RANDOM mg/dL 146*   < > 99    < > = values in this interval not displayed.                 Results from last 7 days   Lab Units 02/20/24  0454 02/18/24  0106 02/17/24 2050   LACTIC ACID mmol/L  --  1.7 2.4*   PROCALCITONIN ng/ml 0.06  --   --        Lines/Drains:  Invasive Devices       Peripheral Intravenous Line  Duration             Peripheral IV 02/23/24 Left Antecubital <1 day              Drain  Duration             External Urinary Catheter 2 days                          Imaging: No pertinent imaging reviewed.    Recent Cultures (last 7 days):   Results from last 7 days   Lab Units 02/17/24 2059 02/17/24 2050   BLOOD CULTURE  No Growth After 5 Days. No Growth After 5 Days.       Last 24 Hours Medication List:   Current Facility-Administered Medications   Medication Dose Route Frequency Provider Last Rate    acetaminophen  650 mg Oral Q6H PRN Som Ceron DO      albuterol  2.5 mg Nebulization Q6H PRN LUC Downs      baclofen  20 mg Oral TID LUC Mukherjee      cephalexin  500 mg Oral Q6H [facility] Eran Benedict MD      Grnhi-Zyjha-Cebfrakc-TenofAF  1 tablet Oral Daily Som Ceron DO      Diclofenac Sodium  2 g Topical 4x Daily Eran Benedict MD      enoxaparin  60 mg Subcutaneous Q12H [facility] Som  Parameswaran, DO      gabapentin  100 mg Oral TID Som Paramangelaan, DO      hydrALAZINE  10 mg Intravenous Q6H PRN Som Paramangelaan, DO      labetalol  10 mg Intravenous Q6H PRN Som Paramalex, DO      ondansetron  4 mg Intravenous Q6H PRN Daljit Bolton MD      oxyCODONE  30 mg Oral Q6H PRN Eran Benedict MD          Today, Patient Was Seen By: Eran Andre MD    **Please Note: This note may have been constructed using a voice recognition system.**

## 2024-02-24 NOTE — ASSESSMENT & PLAN NOTE
Currently on 3 liters nasal canula  No history of oxygen requirements   Likely in the setting of ANA MARIA and OHS  Non compliant with cpap  Reports having sleep study pending as an outpatient  Home oxygen facilitated, currently on 2 liters

## 2024-02-24 NOTE — RESPIRATORY THERAPY NOTE
Discussed use of CPAP for hours of sleep patient adamantly refusing to wear mask at this time on nasal cannula with adequate oxygenation noted. Nothing further from respiratory needed.

## 2024-02-24 NOTE — PLAN OF CARE
Problem: Nutrition/Hydration-ADULT  Goal: Nutrient/Hydration intake appropriate for improving, restoring or maintaining nutritional needs  Description: Monitor and assess patient's nutrition/hydration status for malnutrition. Collaborate with interdisciplinary team and initiate plan and interventions as ordered.  Monitor patient's weight and dietary intake as ordered or per policy. Utilize nutrition screening tool and intervene as necessary. Determine patient's food preferences and provide high-protein, high-caloric foods as appropriate.     INTERVENTIONS:  - Monitor oral intake, urinary output, labs, and treatment plans  - Assess nutrition and hydration status and recommend course of action  - Evaluate amount of meals eaten  - Assist patient with eating if necessary   - Allow adequate time for meals  - Recommend/ encourage appropriate diets, oral nutritional supplements, and vitamin/mineral supplements  - Order, calculate, and assess calorie counts as needed  - Recommend, monitor, and adjust tube feedings and TPN/PPN based on assessed needs  - Assess need for intravenous fluids  - Provide specific nutrition/hydration education as appropriate  - Include patient/family/caregiver in decisions related to nutrition  Outcome: Progressing     Problem: PAIN - ADULT  Goal: Verbalizes/displays adequate comfort level or baseline comfort level  Description: Interventions:  - Encourage patient to monitor pain and request assistance  - Assess pain using appropriate pain scale  - Administer analgesics based on type and severity of pain and evaluate response  - Implement non-pharmacological measures as appropriate and evaluate response  - Consider cultural and social influences on pain and pain management  - Notify physician/advanced practitioner if interventions unsuccessful or patient reports new pain  Outcome: Progressing     Problem: SAFETY ADULT  Goal: Patient will remain free of falls  Description: INTERVENTIONS:  -  Educate patient/family on patient safety including physical limitations  - Instruct patient to call for assistance with activity   - Consult OT/PT to assist with strengthening/mobility   - Keep Call bell within reach  - Keep bed low and locked with side rails adjusted as appropriate  - Keep care items and personal belongings within reach  - Initiate and maintain comfort rounds  - Make Fall Risk Sign visible to staff  - Initiate/Maintain bed/chair alarm  - Obtain necessary fall risk management equipment  - Apply yellow socks and bracelet for high fall risk patients  - Consider moving patient to room near nurses station  Outcome: Progressing  Goal: Maintain or return to baseline ADL function  Description: INTERVENTIONS:  -  Assess patient's ability to carry out ADLs; assess patient's baseline for ADL function and identify physical deficits which impact ability to perform ADLs (bathing, care of mouth/teeth, toileting, grooming, dressing, etc.)  - Assess/evaluate cause of self-care deficits   - Assess range of motion  - Assess patient's mobility; develop plan if impaired  - Assess patient's need for assistive devices and provide as appropriate  - Encourage maximum independence but intervene and supervise when necessary  - Involve family in performance of ADLs  - Assess for home care needs following discharge   - Consider OT consult to assist with ADL evaluation and planning for discharge  - Provide patient education as appropriate  Outcome: Progressing     Problem: DISCHARGE PLANNING  Goal: Discharge to home or other facility with appropriate resources  Description: INTERVENTIONS:  - Identify barriers to discharge w/patient and caregiver  - Arrange for needed discharge resources and transportation as appropriate  - Identify discharge learning needs (meds, wound care, etc.)  - Arrange for interpretive services to assist at discharge as needed  - Refer to Case Management Department for coordinating discharge planning if  the patient needs post-hospital services based on physician/advanced practitioner order or complex needs related to functional status, cognitive ability, or social support system  Outcome: Progressing     Problem: Knowledge Deficit  Goal: Patient/family/caregiver demonstrates understanding of disease process, treatment plan, medications, and discharge instructions  Description: Complete learning assessment and assess knowledge base.  Interventions:  - Provide teaching at level of understanding  - Provide teaching via preferred learning methods  Outcome: Progressing

## 2024-02-24 NOTE — ASSESSMENT & PLAN NOTE
Patient complaining of significant shortness of breath while sleeping  May be attributed to obesity  Trial CPAP HS- she is non compliant with it during hospitalization

## 2024-02-25 PROBLEM — R50.9 FEVER: Status: ACTIVE | Noted: 2024-02-25

## 2024-02-25 LAB
ANION GAP SERPL CALCULATED.3IONS-SCNC: 5 MMOL/L
BASOPHILS # BLD AUTO: 0.04 THOUSANDS/ÂΜL (ref 0–0.1)
BASOPHILS NFR BLD AUTO: 1 % (ref 0–1)
BUN SERPL-MCNC: 12 MG/DL (ref 5–25)
CALCIUM SERPL-MCNC: 9.5 MG/DL (ref 8.4–10.2)
CHLORIDE SERPL-SCNC: 100 MMOL/L (ref 96–108)
CO2 SERPL-SCNC: 33 MMOL/L (ref 21–32)
CREAT SERPL-MCNC: 0.71 MG/DL (ref 0.6–1.3)
EOSINOPHIL # BLD AUTO: 0.16 THOUSAND/ÂΜL (ref 0–0.61)
EOSINOPHIL NFR BLD AUTO: 3 % (ref 0–6)
ERYTHROCYTE [DISTWIDTH] IN BLOOD BY AUTOMATED COUNT: 17.4 % (ref 11.6–15.1)
FLUAV RNA RESP QL NAA+PROBE: NEGATIVE
FLUBV RNA RESP QL NAA+PROBE: NEGATIVE
GFR SERPL CREATININE-BSD FRML MDRD: 97 ML/MIN/1.73SQ M
GLUCOSE SERPL-MCNC: 97 MG/DL (ref 65–140)
HCT VFR BLD AUTO: 34.2 % (ref 34.8–46.1)
HGB BLD-MCNC: 9.9 G/DL (ref 11.5–15.4)
IMM GRANULOCYTES # BLD AUTO: 0.06 THOUSAND/UL (ref 0–0.2)
IMM GRANULOCYTES NFR BLD AUTO: 1 % (ref 0–2)
LYMPHOCYTES # BLD AUTO: 1.1 THOUSANDS/ÂΜL (ref 0.6–4.47)
LYMPHOCYTES NFR BLD AUTO: 21 % (ref 14–44)
MAGNESIUM SERPL-MCNC: 2 MG/DL (ref 1.9–2.7)
MCH RBC QN AUTO: 24.5 PG (ref 26.8–34.3)
MCHC RBC AUTO-ENTMCNC: 28.9 G/DL (ref 31.4–37.4)
MCV RBC AUTO: 85 FL (ref 82–98)
MONOCYTES # BLD AUTO: 0.61 THOUSAND/ÂΜL (ref 0.17–1.22)
MONOCYTES NFR BLD AUTO: 12 % (ref 4–12)
NEUTROPHILS # BLD AUTO: 3.16 THOUSANDS/ÂΜL (ref 1.85–7.62)
NEUTS SEG NFR BLD AUTO: 62 % (ref 43–75)
NRBC BLD AUTO-RTO: 0 /100 WBCS
PLATELET # BLD AUTO: 275 THOUSANDS/UL (ref 149–390)
PMV BLD AUTO: 9.3 FL (ref 8.9–12.7)
POTASSIUM SERPL-SCNC: 4.2 MMOL/L (ref 3.5–5.3)
RBC # BLD AUTO: 4.04 MILLION/UL (ref 3.81–5.12)
RSV RNA RESP QL NAA+PROBE: NEGATIVE
SARS-COV-2 RNA RESP QL NAA+PROBE: NEGATIVE
SODIUM SERPL-SCNC: 138 MMOL/L (ref 135–147)
WBC # BLD AUTO: 5.13 THOUSAND/UL (ref 4.31–10.16)

## 2024-02-25 PROCEDURE — 97530 THERAPEUTIC ACTIVITIES: CPT

## 2024-02-25 PROCEDURE — 85025 COMPLETE CBC W/AUTO DIFF WBC: CPT | Performed by: STUDENT IN AN ORGANIZED HEALTH CARE EDUCATION/TRAINING PROGRAM

## 2024-02-25 PROCEDURE — 97535 SELF CARE MNGMENT TRAINING: CPT

## 2024-02-25 PROCEDURE — 99232 SBSQ HOSP IP/OBS MODERATE 35: CPT | Performed by: STUDENT IN AN ORGANIZED HEALTH CARE EDUCATION/TRAINING PROGRAM

## 2024-02-25 PROCEDURE — 0241U HB NFCT DS VIR RESP RNA 4 TRGT: CPT | Performed by: STUDENT IN AN ORGANIZED HEALTH CARE EDUCATION/TRAINING PROGRAM

## 2024-02-25 PROCEDURE — 80048 BASIC METABOLIC PNL TOTAL CA: CPT | Performed by: STUDENT IN AN ORGANIZED HEALTH CARE EDUCATION/TRAINING PROGRAM

## 2024-02-25 PROCEDURE — 83735 ASSAY OF MAGNESIUM: CPT | Performed by: STUDENT IN AN ORGANIZED HEALTH CARE EDUCATION/TRAINING PROGRAM

## 2024-02-25 RX ADMIN — CEPHALEXIN 500 MG: 500 CAPSULE ORAL at 13:27

## 2024-02-25 RX ADMIN — CEPHALEXIN 500 MG: 500 CAPSULE ORAL at 05:48

## 2024-02-25 RX ADMIN — GABAPENTIN 100 MG: 100 CAPSULE ORAL at 20:54

## 2024-02-25 RX ADMIN — CEPHALEXIN 500 MG: 500 CAPSULE ORAL at 17:06

## 2024-02-25 RX ADMIN — Medication 2 G: at 08:16

## 2024-02-25 RX ADMIN — OXYCODONE HYDROCHLORIDE 30 MG: 10 TABLET ORAL at 03:21

## 2024-02-25 RX ADMIN — Medication 2 G: at 17:06

## 2024-02-25 RX ADMIN — Medication 2 G: at 20:56

## 2024-02-25 RX ADMIN — BACLOFEN 20 MG: 10 TABLET ORAL at 08:12

## 2024-02-25 RX ADMIN — GABAPENTIN 100 MG: 100 CAPSULE ORAL at 17:06

## 2024-02-25 RX ADMIN — BACLOFEN 20 MG: 10 TABLET ORAL at 20:55

## 2024-02-25 RX ADMIN — ACETAMINOPHEN 650 MG: 325 TABLET, FILM COATED ORAL at 13:27

## 2024-02-25 RX ADMIN — OXYCODONE HYDROCHLORIDE 30 MG: 10 TABLET ORAL at 17:17

## 2024-02-25 RX ADMIN — DARUNAVIR, COBICISTAT, EMTRICITABINE, AND TENOFOVIR ALAFENAMIDE 1 TABLET: 800; 150; 200; 10 TABLET, FILM COATED ORAL at 08:15

## 2024-02-25 RX ADMIN — Medication 2 G: at 13:28

## 2024-02-25 RX ADMIN — ACETAMINOPHEN 650 MG: 325 TABLET, FILM COATED ORAL at 21:03

## 2024-02-25 RX ADMIN — ENOXAPARIN SODIUM 60 MG: 60 INJECTION SUBCUTANEOUS at 08:11

## 2024-02-25 RX ADMIN — BACLOFEN 20 MG: 10 TABLET ORAL at 17:06

## 2024-02-25 RX ADMIN — ENOXAPARIN SODIUM 60 MG: 60 INJECTION SUBCUTANEOUS at 20:55

## 2024-02-25 RX ADMIN — GABAPENTIN 100 MG: 100 CAPSULE ORAL at 08:11

## 2024-02-25 RX ADMIN — OXYCODONE HYDROCHLORIDE 30 MG: 10 TABLET ORAL at 09:47

## 2024-02-25 NOTE — ASSESSMENT & PLAN NOTE
Patient complaining of significant shortness of breath while sleeping  May be attributed to obesity  Trial CPAP HS- she is non compliant with it during hospitalization  She has Cpap ordered but refuses to wear it

## 2024-02-25 NOTE — ASSESSMENT & PLAN NOTE
Likely viral etiology, no leukocytosis or other infectious symptoms noted in association  Also has been receiving antibiotics for the last 9 days  Low suspicion for bacterial etiology  Also low suspicion for recurrence of severe cellulitis

## 2024-02-25 NOTE — ASSESSMENT & PLAN NOTE
Significant improvement since initiation of antibiotics  Received IV vancomycin from 2/17-2/21, then transitioned to keflex, with completion of course of antibiotics on 2/26  Does have baseline erythema from chronic venous stasis dermatitis  But overall warmth and tenderness has resolved  Patient was initially supposed to be discharged home   Patient now agreeable to rehab

## 2024-02-25 NOTE — PLAN OF CARE
Problem: OCCUPATIONAL THERAPY ADULT  Goal: Performs self-care activities at highest level of function for planned discharge setting.  See evaluation for individualized goals.  Description: Treatment Interventions: ADL retraining, Functional transfer training, Endurance training, Patient/family training, Compensatory technique education  Equipment Recommended: Shower/Tub chair with back ($), Hip Kit ($)       See flowsheet documentation for full assessment, interventions and recommendations.   Outcome: Progressing  Note: Limitation: Decreased ADL status, Decreased UE strength, Decreased Safe judgement during ADL, Decreased endurance  Prognosis: Fair  Assessment: Patient participated in Skilled OT session this date with interventions consisting of ADL re training with the use of correct body mechnaics,  therapeutic activities to: increase activity tolerance, and increase dynamic sit/ stand balance during functional activity  . Patient agreeable to OT treatment session, upon arrival patient was found supine in bed and in no apparent distress. Patient completed bed mobility with mod assist of 2 and functional transfers with min assist of 2. Pt ambulated short distance to recliner with min assist of 2 utilizing RW. While seated at EOB, pt required mod assist for UB ADLs and max assist for LB ADLs. In comparison to previous session, patient with improvements in sitting tolerance. Patient requiring verbal cues for safety, verbal cues for correct technique, one step directives, and frequent rest periods. Patient continues to be functioning below baseline level, occupational performance remains limited secondary to factors listed above and increased risk for falls and injury.   From OT standpoint, recommendation at time of d/c would be Level I (maximum resource intensity).   Patient to benefit from continued Occupational Therapy treatment while in the hospital to address deficits as defined above and maximize level of  functional independence with ADLs and functional mobility.     Rehab Resource Intensity Level, OT: I (Maximum Resource Intensity)        Elham Cotto OTD, OTR/L

## 2024-02-25 NOTE — PHYSICAL THERAPY NOTE
"Physical Therapy Treatment Note    Patient Name: Robyn Lyons    Diagnosis: Leg pain [M79.606]  Cellulitis of right lower extremity [L03.115]     02/25/24 0934   PT Last Visit   PT Visit Date 02/25/24   Note Type   Note Type Treatment for insurance authorization   Pain Assessment   Pain Assessment Tool 0-10   Pain Score 9   Pain Location/Orientation Orientation: Left;Location: Leg   Pain Onset/Description Onset: Ongoing   Hospital Pain Intervention(s) Repositioned;Ambulation/increased activity   Multiple Pain Sites Yes   Pain 2   Pain Score 2 6   Pain Location/Orientation 2 Orientation: Right;Location: Leg   Pain Onset/Description 2 Onset: Ongoing   Hospital Pain Intervention(s) 2 Repositioned;Ambulation/increased activity   Restrictions/Precautions   Weight Bearing Precautions Per Order No   Other Precautions Chair Alarm;Bed Alarm;O2;Fall Risk;Pain  (+4L O2 via NC)   General   Chart Reviewed Yes   Response to Previous Treatment Patient with no complaints from previous session.   Family/Caregiver Present No   Cognition   Overall Cognitive Status WFL   Arousal/Participation Alert;Responsive;Cooperative   Attention Within functional limits   Orientation Level Oriented X4   Memory Within functional limits   Following Commands Follows all commands and directions without difficulty   Comments Pt agreeable to PT.   Subjective   Subjective \"I'm weak.\"   Bed Mobility   Supine to Sit 3  Moderate assistance   Additional items Assist x 2;HOB elevated;Bedrails;Increased time required;Verbal cues;LE management   Transfers   Sit to Stand 4  Minimal assistance   Additional items Assist x 2;Increased time required;Verbal cues   Stand to Sit 4  Minimal assistance   Additional items Assist x 2;Armrests;Increased time required;Verbal cues   Ambulation/Elevation   Gait pattern Decreased toe off;Decreased heel strike;Decreased hip extension;Excessively slow;Step to;Short stride;Shuffling;Wide GENO   Gait Assistance 4  Minimal assist "   Additional items Assist x 2;Verbal cues   Assistive Device Rolling walker   Distance 3 feet, bed to chair   Balance   Static Sitting Fair +   Dynamic Sitting Fair   Static Standing Poor +   Dynamic Standing Poor   Ambulatory Poor   Endurance Deficit   Endurance Deficit Yes   Endurance Deficit Description decreased activity tolerance; pt requiring supplemental oxygen; pt was received on 4L O2 via NC   Activity Tolerance   Activity Tolerance Patient limited by fatigue;Patient limited by pain   Medical Staff Made Aware OT Elham   Nurse Made Aware RN aware   Exercises   Knee AROM Long Arc Quad Sitting;10 reps;AROM;Bilateral   Ankle Pumps Sitting;10 reps;AROM;Bilateral   Balance training  sit to stand transfers: 2x   Assessment   Prognosis Good   Problem List Decreased strength;Decreased endurance;Impaired balance;Decreased mobility;Impaired sensation;Obesity;Pain   Assessment Chart reviewed. Patient was received supine in bed in NAD and agreeable to PT session. Today's PT treatment session consisted of therapeutic activity for facilitation of transitional movements and safe performance of correct technique for bed mobility and sit to stand transfers, therapeutic exercise to increase lower extremity muscle strength, and gait training to promote safe and functional ambulation on level surfaces. In comparison to the previous session the patient continues to require assist of two for all functional mobility. Pt's ambulation distance remains limited secondary to pt's complaints of pain and fatigue. Pt tolerated therapeutic exercise well without complaints. Overall, patient tolerated today's session well. Patient's prognosis for achieving their STG's is good as evident by pt's motivation. Co-treatment was performed with OT secondary to complex medical condition of pt. Pt requires assist of two to achieve and maintain transitional movements; requiring the need of skilled therapeutic intervention of two therapists to achieve  the delivery of services. PT/OT goals were addressed separately. PT intervention continues to be appropriate as the patient continues to be limited by pain, decreased lower extremity strength, impaired balance, decreased endurance, gait deviations, and decreased functional mobility. Continue to recommend level 1, maximum resource intensity. PT to continue to see patient in order to address the deficits listed above and provide interventions consistent with the POC in order to achieve STG's and optimize the patient's independence with functional mobility.   Barriers to Discharge Inaccessible home environment;Decreased caregiver support   Goals   STG Expiration Date 03/02/24   PT Treatment Day 1   Plan   Treatment/Interventions Functional transfer training;LE strengthening/ROM;Elevations;Therapeutic exercise;Endurance training;Patient/family training;Bed mobility;Gait training;Spoke to nursing;OT   Progress No functional improvements   PT Frequency 3-5x/wk   Discharge Recommendation   Rehab Resource Intensity Level, PT I (Maximum Resource Intensity)   AM-PAC Basic Mobility Inpatient   Turning in Flat Bed Without Bedrails 2   Lying on Back to Sitting on Edge of Flat Bed Without Bedrails 1   Moving Bed to Chair 2   Standing Up From Chair Using Arms 2   Walk in Room 2   Climb 3-5 Stairs With Railing 1   Basic Mobility Inpatient Raw Score 10   Turning Head Towards Sound 4   Follow Simple Instructions 4   Low Function Basic Mobility Raw Score  18   Low Function Basic Mobility Standardized Score  29.25   Highest Level Of Mobility   JH-HLM Goal 4: Move to chair/commode   JH-HLM Achieved 4: Move to chair/commode   Education   Education Provided Mobility training;Home exercise program;Assistive device   Patient Reinforcement needed   End of Consult   Patient Position at End of Consult Bedside chair;Bed/Chair alarm activated;All needs within reach  (RN aware)     Nancy Tang, PT, DPT    Time of PT treatment session:  [id number]  25 [id number]

## 2024-02-25 NOTE — PLAN OF CARE
Problem: PHYSICAL THERAPY ADULT  Goal: Performs mobility at highest level of function for planned discharge setting.  See evaluation for individualized goals.  Description: Treatment/Interventions: Functional transfer training, LE strengthening/ROM, Elevations, Therapeutic exercise, Endurance training, Patient/family training, Bed mobility, Gait training, Spoke to nursing, OT          See flowsheet documentation for full assessment, interventions and recommendations.  Note: Prognosis: Good  Problem List: Decreased strength, Decreased endurance, Impaired balance, Decreased mobility, Impaired sensation, Obesity, Pain  Assessment: Chart reviewed. Patient was received supine in bed in NAD and agreeable to PT session. Today's PT treatment session consisted of therapeutic activity for facilitation of transitional movements and safe performance of correct technique for bed mobility and sit to stand transfers, therapeutic exercise to increase lower extremity muscle strength, and gait training to promote safe and functional ambulation on level surfaces. In comparison to the previous session the patient continues to require assist of two for all functional mobility. Pt's ambulation distance remains limited secondary to pt's complaints of pain and fatigue. Pt tolerated therapeutic exercise well without complaints. Overall, patient tolerated today's session well. Patient's prognosis for achieving their STG's is good as evident by pt's motivation. Co-treatment was performed with OT secondary to complex medical condition of pt. Pt requires assist of two to achieve and maintain transitional movements; requiring the need of skilled therapeutic intervention of two therapists to achieve the delivery of services. PT/OT goals were addressed separately. PT intervention continues to be appropriate as the patient continues to be limited by pain, decreased lower extremity strength, impaired balance, decreased endurance, gait deviations,  and decreased functional mobility. Continue to recommend level 1, maximum resource intensity. PT to continue to see patient in order to address the deficits listed above and provide interventions consistent with the POC in order to achieve STG's and optimize the patient's independence with functional mobility.    Barriers to Discharge: Inaccessible home environment, Decreased caregiver support     Rehab Resource Intensity Level, PT: I (Maximum Resource Intensity)    See flowsheet documentation for full assessment.

## 2024-02-25 NOTE — PROGRESS NOTES
[facility]  Progress Note  Name: Robyn Lyons I  MRN: [id number]  Unit/Bed#: MS [place]-01 I Date of Admission: 2/17/2024   Date of Service: 2/25/2024 I Hospital Day: 8    Assessment/Plan   * Cellulitis of right lower extremity  Assessment & Plan  Significant improvement since initiation of antibiotics  Received IV vancomycin from 2/17-2/21, then transitioned to keflex, with completion of course of antibiotics on 2/26  Does have baseline erythema from chronic venous stasis dermatitis  But overall warmth and tenderness has resolved  Patient was initially supposed to be discharged home   Patient now agreeable to rehab      Fever  Assessment & Plan  Likely viral etiology, no leukocytosis or other infectious symptoms noted in association  Also has been receiving antibiotics for the last 9 days  Low suspicion for bacterial etiology  Also low suspicion for recurrence of severe cellulitis     Obesity hypoventilation syndrome (HCC)  Assessment & Plan  Patient complaining of significant shortness of breath while sleeping  May be attributed to obesity  Trial CPAP HS- she is non compliant with it during hospitalization  She has Cpap ordered but refuses to wear it    Asymptomatic HIV infection, with no history of HIV-related illness (HCC)  Assessment & Plan  Continue home [place] [phone]    Acute respiratory failure with hypoxia (HCC)  Assessment & Plan  Currently on 3 liters nasal canula  No history of oxygen requirements   Likely in the setting of ANA MARIA and OHS  Non compliant with cpap  Reports having sleep study pending as an outpatient  Home oxygen facilitated, currently on 2 liters               VTE Pharmacologic Prophylaxis: VTE Score: 5 High Risk (Score >/= 5) - Pharmacological DVT Prophylaxis Ordered: enoxaparin (Lovenox). Sequential Compression Devices Ordered.    Mobility:   Basic Mobility Inpatient Raw Score: 10  -M Goal: 4: Move to chair/commode  -HLM Achieved: 4: Move to  chair/commode  HLM Goal achieved. Continue to encourage appropriate mobility.    Patient Centered Rounds: I performed bedside rounds with nursing staff today.   Discussions with Specialists or Other Care Team Provider: sadi    Education and Discussions with Family / Patient: Patient declined call to .     Total Time Spent on Date of Encounter in care of patient: 30+ mins. This time was spent on one or more of the following: performing physical exam; counseling and coordination of care; obtaining or reviewing history; documenting in the medical record; reviewing/ordering tests, medications or procedures; communicating with other healthcare professionals and discussing with patient's family/caregivers.    Current Length of Stay: 8 day(s)  Current Patient Status: Inpatient   Certification Statement: The patient will continue to require additional inpatient hospital stay due to placement to rehab  Discharge Plan: Anticipate discharge in 24-48 hrs to rehab facility.    Code Status: Level 1 - Full Code    Subjective:   Patient feels chills today     Objective:     Vitals:   Temp (24hrs), Av °F (37.2 °C), Min:97.8 °F (36.6 °C), Max:100.3 °F (37.9 °C)    Temp:  [97.8 °F (36.6 °C)-100.3 °F (37.9 °C)] 98.3 °F (36.8 °C)  HR:  [86-88] 86  Resp:  [16] 16  BP: (130-143)/() 133/69  SpO2:  [94 %-96 %] 96 %  Body mass index is 69.06 kg/m².     Input and Output Summary (last 24 hours):   No intake or output data in the 24 hours ending 24 1229    Physical Exam:   Physical Exam  Constitutional:       General: She is not in acute distress.     Appearance: Normal appearance. She is not toxic-appearing.   Cardiovascular:      Rate and Rhythm: Normal rate and regular rhythm.      Heart sounds: Normal heart sounds. No murmur heard.  Pulmonary:      Effort: Pulmonary effort is normal. No respiratory distress.      Breath sounds: Normal breath sounds. No wheezing.   Abdominal:      General: Abdomen is flat. There  is no distension.      Palpations: Abdomen is soft.      Tenderness: There is no abdominal tenderness.   Neurological:      General: No focal deficit present.      Mental Status: She is alert and oriented to person, place, and time. Mental status is at baseline.      Motor: No weakness.          Additional Data:     Labs:  Results from last 7 days   Lab Units 02/25/24  0615   WBC Thousand/uL 5.13   HEMOGLOBIN g/dL 9.9*   HEMATOCRIT % 34.2*   PLATELETS Thousands/uL 275   NEUTROS PCT % 62   LYMPHS PCT % 21   MONOS PCT % 12   EOS PCT % 3     Results from last 7 days   Lab Units 02/25/24  0615   SODIUM mmol/L 138   POTASSIUM mmol/L 4.2   CHLORIDE mmol/L 100   CO2 mmol/L 33*   BUN mg/dL 12   CREATININE mg/dL 0.71   ANION GAP mmol/L 5   CALCIUM mg/dL 9.5   GLUCOSE RANDOM mg/dL 97                 Results from last 7 days   Lab Units 02/20/24  0454   PROCALCITONIN ng/ml 0.06       Lines/Drains:  Invasive Devices       Peripheral Intravenous Line  Duration             Peripheral IV 02/23/24 Left Antecubital 1 day              Drain  Duration             External Urinary Catheter 3 days                          Imaging: No pertinent imaging reviewed.    Recent Cultures (last 7 days):         Last 24 Hours Medication List:   Current Facility-Administered Medications   Medication Dose Route Frequency Provider Last Rate    acetaminophen  650 mg Oral Q6H PRN Som Ceron,       albuterol  2.5 mg Nebulization Q6H PRN LUC Downs      baclofen  20 mg Oral TID LUC Mukherjee      cephalexin  500 mg Oral Q6H [facility] Eran Benedict MD      Njebt-Lmcip-Kirhcsqs-TenofAF  1 tablet Oral Daily Som Ceron, DO      Diclofenac Sodium  2 g Topical 4x Daily Eran Benedict MD      enoxaparin  60 mg Subcutaneous Q12H [facility] Som Ceron, DO      gabapentin  100 mg Oral TID Som Ceron, DO      hydrALAZINE  10 mg Intravenous Q6H PRN Som Ceron,       labetalol  10 mg Intravenous Q6H  PRN Som Ceron DO      ondansetron  4 mg Intravenous Q6H PRN Daljit Bolton MD      oxyCODONE  30 mg Oral Q6H PRN Eran Benedict MD          Today, Patient Was Seen By: Eran Andre MD    **Please Note: This note may have been constructed using a voice recognition system.**

## 2024-02-25 NOTE — OCCUPATIONAL THERAPY NOTE
Occupational Therapy Treatment Note     Patient Name: Robyn Lyons  Today's Date: 2/25/2024  Problem List  Principal Problem:    Cellulitis of right lower extremity  Active Problems:    Class 3 severe obesity due to excess calories with serious comorbidity and body mass index (BMI) of 60.0 to 69.9 in adult (HCC)    Anemia    Acute respiratory failure with hypoxia (HCC)    Asymptomatic HIV infection, with no history of HIV-related illness (HCC)    Obesity hypoventilation syndrome (HCC)    Ambulatory dysfunction        02/25/24 0959   OT Last Visit   OT Visit Date 02/25/24   Note Type   Note Type Treatment for insurance authorization   Pain Assessment   Pain Assessment Tool 0-10   Pain Score 9   Pain Location/Orientation Orientation: Left;Location: Leg   Pain Onset/Description Onset: Ongoing;Frequency: Constant/Continuous   Hospital Pain Intervention(s) Ambulation/increased activity;Repositioned;Medication (See MAR)   Restrictions/Precautions   Weight Bearing Precautions Per Order No   Other Precautions Chair Alarm;Bed Alarm;O2;Fall Risk;Pain  (4 L O2 via NC)   ADL   Where Assessed Edge of bed   UB Bathing Assistance 3  Moderate Assistance   UB Bathing Deficit Setup;Verbal cueing;Supervision/safety;Increased time to complete;Abdomen;Chest;Right arm;Left arm   LB Bathing Assistance 2  Maximal Assistance   LB Bathing Deficit Setup;Steadying;Verbal cueing;Supervision/safety;Increased time to complete;Perineal area;Buttocks;Right lower leg including foot;Left lower leg including foot   UB Dressing Assistance 3  Moderate Assistance   UB Dressing Deficit Setup;Verbal cueing;Supervision/safety;Increased time to complete;Thread RUE;Thread LUE;Pull around back;Fasteners   UB Dressing Comments Pt donned hospital gown   LB Dressing Assistance 2  Maximal Assistance   LB Dressing Deficit Don/doff R shoe;Don/doff L shoe   LB Dressing Comments Pt donned shoes only   Bed Mobility   Supine to Sit 3  Moderate assistance    Additional items Assist x 2;HOB elevated;Bedrails;Increased time required;Verbal cues;LE management   Sit to Supine   (DNT: pt seated OOB in recliner at end of session)   Transfers   Sit to Stand 4  Minimal assistance   Additional items Assist x 2;Bedrails;Increased time required;Verbal cues   Stand to Sit 4  Minimal assistance   Additional items Assist x 2;Increased time required;Verbal cues;Armrests   Functional Mobility   Functional Mobility 4  Minimal assistance  (Assist of 2)   Additional Comments Pt ambulated short distance to recliner. Pt unsteady and requires cues for sequencing/safety.   Additional items Rolling walker   Cognition   Overall Cognitive Status WFL   Arousal/Participation Alert;Responsive;Cooperative   Attention Attends with cues to redirect   Orientation Level Oriented X4   Memory Decreased recall of recent events   Following Commands Follows all commands and directions without difficulty   Comments Pt agreeable to OT session   Activity Tolerance   Activity Tolerance Patient limited by fatigue;Patient limited by pain   Medical Staff Made Aware During session, pt benefited from two skilled therapists due to extensive physical assistance to achieve transitional movements and pt's decreased activity tolerance.   Assessment   Assessment Patient participated in Skilled OT session this date with interventions consisting of ADL re training with the use of correct body mechnaics,  therapeutic activities to: increase activity tolerance, and increase dynamic sit/ stand balance during functional activity  . Patient agreeable to OT treatment session, upon arrival patient was found supine in bed and in no apparent distress. Patient completed bed mobility with mod assist of 2 and functional transfers with min assist of 2. Pt ambulated short distance to recliner with min assist of 2 utilizing RW. While seated at EOB, pt required mod assist for UB ADLs and max assist for LB ADLs. In comparison to previous  session, patient with improvements in sitting tolerance. Patient requiring verbal cues for safety, verbal cues for correct technique, one step directives, and frequent rest periods. Patient continues to be functioning below baseline level, occupational performance remains limited secondary to factors listed above and increased risk for falls and injury.   From OT standpoint, recommendation at time of d/c would be Level I (maximum resource intensity).   Patient to benefit from continued Occupational Therapy treatment while in the hospital to address deficits as defined above and maximize level of functional independence with ADLs and functional mobility.   Plan   Treatment Interventions ADL retraining;Functional transfer training;Endurance training;Patient/family training;Compensatory technique education   Goal Expiration Date 03/03/24   OT Treatment Day 2   OT Frequency 3-5x/wk   Discharge Recommendation   Rehab Resource Intensity Level, OT I (Maximum Resource Intensity)   AM-PAC Daily Activity Inpatient   Lower Body Dressing 1   Bathing 1   Toileting 1   Upper Body Dressing 2   Grooming 3   Eating 3   Daily Activity Raw Score 11   Daily Activity Standardized Score (Calc for Raw Score >=11) 29.04   AM-PAC Applied Cognition Inpatient   Following a Speech/Presentation 3   Understanding Ordinary Conversation 4   Taking Medications 3   Remembering Where Things Are Placed or Put Away 3   Remembering List of 4-5 Errands 3   Taking Care of Complicated Tasks 3   Applied Cognition Raw Score 19   Applied Cognition Standardized Score 39.77   Modified Manjeet Scale   Modified [place] Scale 4   End of Consult   Patient Position at End of Consult All needs within reach;Bedside chair;Bed/Chair alarm activated   Nurse Communication Nurse aware of consult     Elham Cotto OTSHEMAR, OTR/L

## 2024-02-25 NOTE — NURSING NOTE
"Upon placing pt on bed pan, home bottle of gabapentin found in bed. Pt denies taking any home pills states \"I only take home meds given to me here by nurse\" pt denies having any other home meds at this time.  "

## 2024-02-25 NOTE — PLAN OF CARE
Problem: Nutrition/Hydration-ADULT  Goal: Nutrient/Hydration intake appropriate for improving, restoring or maintaining nutritional needs  Description: Monitor and assess patient's nutrition/hydration status for malnutrition. Collaborate with interdisciplinary team and initiate plan and interventions as ordered.  Monitor patient's weight and dietary intake as ordered or per policy. Utilize nutrition screening tool and intervene as necessary. Determine patient's food preferences and provide high-protein, high-caloric foods as appropriate.     INTERVENTIONS:  - Monitor oral intake, urinary output, labs, and treatment plans  - Assess nutrition and hydration status and recommend course of action  - Evaluate amount of meals eaten  - Assist patient with eating if necessary   - Allow adequate time for meals  - Recommend/ encourage appropriate diets, oral nutritional supplements, and vitamin/mineral supplements  - Order, calculate, and assess calorie counts as needed  - Recommend, monitor, and adjust tube feedings and TPN/PPN based on assessed needs  - Assess need for intravenous fluids  - Provide specific nutrition/hydration education as appropriate  - Include patient/family/caregiver in decisions related to nutrition  Outcome: Progressing     Problem: PAIN - ADULT  Goal: Verbalizes/displays adequate comfort level or baseline comfort level  Description: Interventions:  - Encourage patient to monitor pain and request assistance  - Assess pain using appropriate pain scale  - Administer analgesics based on type and severity of pain and evaluate response  - Implement non-pharmacological measures as appropriate and evaluate response  - Consider cultural and social influences on pain and pain management  - Notify physician/advanced practitioner if interventions unsuccessful or patient reports new pain  Outcome: Progressing     Problem: Knowledge Deficit  Goal: Patient/family/caregiver demonstrates understanding of disease  process, treatment plan, medications, and discharge instructions  Description: Complete learning assessment and assess knowledge base.  Interventions:  - Provide teaching at level of understanding  - Provide teaching via preferred learning methods  Outcome: Progressing

## 2024-02-26 LAB
ANION GAP SERPL CALCULATED.3IONS-SCNC: 5 MMOL/L
BASOPHILS # BLD AUTO: 0.04 THOUSANDS/ÂΜL (ref 0–0.1)
BASOPHILS NFR BLD AUTO: 1 % (ref 0–1)
BUN SERPL-MCNC: 16 MG/DL (ref 5–25)
CALCIUM SERPL-MCNC: 9.1 MG/DL (ref 8.4–10.2)
CHLORIDE SERPL-SCNC: 99 MMOL/L (ref 96–108)
CO2 SERPL-SCNC: 36 MMOL/L (ref 21–32)
CREAT SERPL-MCNC: 0.77 MG/DL (ref 0.6–1.3)
DME PARACHUTE DELIVERY DATE ACTUAL: NORMAL
DME PARACHUTE DELIVERY DATE EXPECTED: NORMAL
DME PARACHUTE DELIVERY DATE REQUESTED: NORMAL
DME PARACHUTE ITEM DESCRIPTION: NORMAL
DME PARACHUTE ORDER STATUS: NORMAL
DME PARACHUTE SUPPLIER NAME: NORMAL
DME PARACHUTE SUPPLIER PHONE: NORMAL
EOSINOPHIL # BLD AUTO: 0.18 THOUSAND/ÂΜL (ref 0–0.61)
EOSINOPHIL NFR BLD AUTO: 5 % (ref 0–6)
ERYTHROCYTE [DISTWIDTH] IN BLOOD BY AUTOMATED COUNT: 17.4 % (ref 11.6–15.1)
GFR SERPL CREATININE-BSD FRML MDRD: 88 ML/MIN/1.73SQ M
GLUCOSE SERPL-MCNC: 112 MG/DL (ref 65–140)
HCT VFR BLD AUTO: 32.5 % (ref 34.8–46.1)
HGB BLD-MCNC: 9.4 G/DL (ref 11.5–15.4)
IMM GRANULOCYTES # BLD AUTO: 0.07 THOUSAND/UL (ref 0–0.2)
IMM GRANULOCYTES NFR BLD AUTO: 2 % (ref 0–2)
LYMPHOCYTES # BLD AUTO: 1.09 THOUSANDS/ÂΜL (ref 0.6–4.47)
LYMPHOCYTES NFR BLD AUTO: 29 % (ref 14–44)
MAGNESIUM SERPL-MCNC: 1.9 MG/DL (ref 1.9–2.7)
MCH RBC QN AUTO: 24.5 PG (ref 26.8–34.3)
MCHC RBC AUTO-ENTMCNC: 28.9 G/DL (ref 31.4–37.4)
MCV RBC AUTO: 85 FL (ref 82–98)
MONOCYTES # BLD AUTO: 0.52 THOUSAND/ÂΜL (ref 0.17–1.22)
MONOCYTES NFR BLD AUTO: 14 % (ref 4–12)
NEUTROPHILS # BLD AUTO: 1.9 THOUSANDS/ÂΜL (ref 1.85–7.62)
NEUTS SEG NFR BLD AUTO: 49 % (ref 43–75)
NRBC BLD AUTO-RTO: 1 /100 WBCS
PLATELET # BLD AUTO: 273 THOUSANDS/UL (ref 149–390)
PMV BLD AUTO: 9.5 FL (ref 8.9–12.7)
POTASSIUM SERPL-SCNC: 4.2 MMOL/L (ref 3.5–5.3)
RBC # BLD AUTO: 3.83 MILLION/UL (ref 3.81–5.12)
SODIUM SERPL-SCNC: 140 MMOL/L (ref 135–147)
WBC # BLD AUTO: 3.8 THOUSAND/UL (ref 4.31–10.16)

## 2024-02-26 PROCEDURE — 99232 SBSQ HOSP IP/OBS MODERATE 35: CPT | Performed by: STUDENT IN AN ORGANIZED HEALTH CARE EDUCATION/TRAINING PROGRAM

## 2024-02-26 PROCEDURE — 80048 BASIC METABOLIC PNL TOTAL CA: CPT | Performed by: STUDENT IN AN ORGANIZED HEALTH CARE EDUCATION/TRAINING PROGRAM

## 2024-02-26 PROCEDURE — 83735 ASSAY OF MAGNESIUM: CPT | Performed by: STUDENT IN AN ORGANIZED HEALTH CARE EDUCATION/TRAINING PROGRAM

## 2024-02-26 PROCEDURE — 94760 N-INVAS EAR/PLS OXIMETRY 1: CPT

## 2024-02-26 PROCEDURE — 94664 DEMO&/EVAL PT USE INHALER: CPT

## 2024-02-26 PROCEDURE — 94640 AIRWAY INHALATION TREATMENT: CPT

## 2024-02-26 PROCEDURE — 85025 COMPLETE CBC W/AUTO DIFF WBC: CPT | Performed by: STUDENT IN AN ORGANIZED HEALTH CARE EDUCATION/TRAINING PROGRAM

## 2024-02-26 RX ORDER — OLANZAPINE 2.5 MG/1
2.5 TABLET, FILM COATED ORAL ONCE
Status: COMPLETED | OUTPATIENT
Start: 2024-02-26 | End: 2024-02-27

## 2024-02-26 RX ADMIN — CEPHALEXIN 500 MG: 500 CAPSULE ORAL at 00:40

## 2024-02-26 RX ADMIN — GABAPENTIN 100 MG: 100 CAPSULE ORAL at 22:16

## 2024-02-26 RX ADMIN — CEPHALEXIN 500 MG: 500 CAPSULE ORAL at 11:59

## 2024-02-26 RX ADMIN — GABAPENTIN 100 MG: 100 CAPSULE ORAL at 08:00

## 2024-02-26 RX ADMIN — Medication 2 G: at 08:04

## 2024-02-26 RX ADMIN — BACLOFEN 20 MG: 10 TABLET ORAL at 22:16

## 2024-02-26 RX ADMIN — ALBUTEROL SULFATE 2.5 MG: 2.5 SOLUTION RESPIRATORY (INHALATION) at 21:06

## 2024-02-26 RX ADMIN — CEPHALEXIN 500 MG: 500 CAPSULE ORAL at 05:43

## 2024-02-26 RX ADMIN — OXYCODONE HYDROCHLORIDE 30 MG: 10 TABLET ORAL at 20:10

## 2024-02-26 RX ADMIN — OXYCODONE HYDROCHLORIDE 30 MG: 10 TABLET ORAL at 01:42

## 2024-02-26 RX ADMIN — GABAPENTIN 100 MG: 100 CAPSULE ORAL at 17:20

## 2024-02-26 RX ADMIN — ACETAMINOPHEN 650 MG: 325 TABLET, FILM COATED ORAL at 07:57

## 2024-02-26 RX ADMIN — ACETAMINOPHEN 650 MG: 325 TABLET, FILM COATED ORAL at 17:20

## 2024-02-26 RX ADMIN — BACLOFEN 20 MG: 10 TABLET ORAL at 17:19

## 2024-02-26 RX ADMIN — ENOXAPARIN SODIUM 60 MG: 60 INJECTION SUBCUTANEOUS at 22:16

## 2024-02-26 RX ADMIN — BACLOFEN 20 MG: 10 TABLET ORAL at 08:00

## 2024-02-26 RX ADMIN — ENOXAPARIN SODIUM 60 MG: 60 INJECTION SUBCUTANEOUS at 08:00

## 2024-02-26 RX ADMIN — DARUNAVIR, COBICISTAT, EMTRICITABINE, AND TENOFOVIR ALAFENAMIDE 1 TABLET: 800; 150; 200; 10 TABLET, FILM COATED ORAL at 08:02

## 2024-02-26 RX ADMIN — OXYCODONE HYDROCHLORIDE 30 MG: 10 TABLET ORAL at 11:59

## 2024-02-26 NOTE — CASE MANAGEMENT
Case Management Discharge Planning Note    Patient name Robyn Lyons  Location /-01 MRN [id number]  : 1970 Date 2024       Current Admission Date: 2024  Current Admission Diagnosis:Cellulitis of right lower extremity   Patient Active Problem List    Diagnosis Date Noted    Fever 2024    Cellulitis of right lower extremity 2024    Class 3 severe obesity due to excess calories with serious comorbidity and body mass index (BMI) of 60.0 to 69.9 in adult (HCC) 2024    Anemia 2024    Acute respiratory failure with hypoxia ([facility]) 2024    Asymptomatic HIV infection, with no history of HIV-related illness ([facility]) 2024    Obesity hypoventilation syndrome ([facility]) 2024    Ambulatory dysfunction 2024    Venous insufficiency 2022    Anxiety 2022    Gastroesophageal reflux disease 2022    Hypercholesterolemia 2022    Lower leg edema 2022    Vitamin D deficiency 2022    History of Aris-en-Y gastric bypass 2020      LOS (days): 9  Geometric Mean LOS (GMLOS) (days): 4.8  Days to GMLOS:-3.8     OBJECTIVE:  Risk of Unplanned Readmission Score: 10.36         Current admission status: Inpatient   Preferred Pharmacy:   [facility] - [address]  [address]  [address]  [place] [address]  Phone: [phone] Fax: [phone]    Primary Care Provider: No primary care provider on file.    Primary Insurance: [facility] [place] DUAL COMPLETE  REP  Secondary Insurance: [facility]    DISCHARGE DETAILS:    Discharge planning discussed with:: Pt at bedside  [place] of Choice: Yes  Comments - Freedom of Choice: As per SLIM, pt is medically cleared for dc. Auth was submitted for pt to go to [place]. Ananya Delgado made aware and met with pt at bedside. CM continues to follow and asssist with pt dc plans.  CM contacted family/caregiver?: Yes  Were Treatment Team  discharge recommendations reviewed with patient/caregiver?: Yes  Did patient/caregiver verbalize understanding of patient care needs?: Yes  Were patient/caregiver advised of the risks associated with not following Treatment Team discharge recommendations?: Yes    Contacts  Patient Contacts: Peewee Lyons  Relationship to Patient:: Family  Contact Method: Phone  Reason/Outcome: Continuity of Care, Emergency Contact, Referral, Discharge Planning    Requested Home Health Care         Is the patient interested in HHC at discharge?: No    DME Referral Provided  Referral made for DME?: No          Treatment Team Recommendation: Short Term Rehab  Discharge Destination Plan:: Short Term Rehab  Transport at Discharge : [facility]  Dispatcher Contacted: No         IMM Given (Date):: 02/26/24  IMM Given to:: Patient  Family notified:: Pt at bedside  Additional Comments: CM reviewed IMM with pt at bedside. Pt expressed full and complete understanding. verbal consent obtained and copy given. Original placed in MR

## 2024-02-26 NOTE — WOUND OSTOMY CARE
Progress Note - Wound   Robyn Lyons 53 y.o. female MRN: [id number]  Unit/Bed#: -01 Encounter: [id number]      Assessment:   Patient admitted to [facility] due to cellulitis of right lower extremity. History of HIV, anemia, obesity. Wound care follow-up for right lower extremity wound. Patient agreeable to assessment, alert and oriented x4, continent of bowel and bladder, on a bariatric low air loss mattress, assist of 1 to turn for assessment, is an assist with care.      1. Bilateral sacrum, buttock, hips, elbows, and heels- skin is dry, intact, blanchable.     2. Left abdominal pannus and beneath breast MASD/ITD- Wounds pictured and measured together, Wounds are linear in shape, partial thickness, 100% pink tissue, with small amount of serous drainage noted. [place]-wound is macerated skin, intact, blanchable, no rash noted.     3. Right posterior tibial distal- Wound on assessment has resolved, skin is dry, intact, pink epithelialization.      Educated patient on importance of frequent offloading of pressure via turning, repositioning, and weight-shifting. Verbalized understanding of plan of care.     No induration, fluctuance, odor, or purulence noted to the above noted wound. New dressing applied. Patient tolerated well, reports mild pain to the wound. Primary nurse aware of plan of care. See flow sheets for more detailed assessment findings. Will follow along.        Skin care plans:  1-Hydraguard to bilateral sacrum, buttock and heels BID and PRN  2-Elevate heels to offload pressure.  3-Ehob cushion in chair when out of bed.  4-Moisturize skin daily with skin nourishing cream.  5-Turn/reposition q2h for pressure re-distribution on skin.   6-Right posterior distal tibial- Cleanse skin with soap and water, pat dry. Apply Silicone foam dressing for protection. Change every 3 days and as needed for soilage/displacement.  7-B/L breast and abdominal pannus- Cleanse with soap and water, and pat completely dry.  Cut strips of InterDry and place the fabric flat in the skin folds, leaving 2 inches outside of the skin fold. Take InterDry out and cleanse skin with soap and water daily, pat dry and place InterDry back in place. Replace every 3 days or sooner if soiled. Do not place any creams or powders on the skin that InterDry is being placed.     Wound 02/19/24 Tibial Posterior;Right;Distal (Active)   Wound Image   02/26/24 1109   Wound Description Dry;Intact;Epithelialization;Pink 02/26/24 1109   [place]-wound Assessment Dry;Intact;Ricardo 02/26/24 1109   Wound Length (cm) 0 cm 02/26/24 1109   Wound Width (cm) 0 cm 02/26/24 1109   Wound Depth (cm) 0 cm 02/26/24 1109   Wound Surface Area (cm^2) 0 cm^2 02/26/24 1109   Wound Volume (cm^3) 0 cm^3 02/26/24 1109   Calculated Wound Volume (cm^3) 0 cm^3 02/26/24 1109   Change in Wound Size % 100 02/26/24 1109   Drainage Amount None 02/26/24 1109        Non-staged Wound Description Not applicable 02/26/24 1109   Treatments Cleansed;Site care 02/26/24 1109   Dressing Foam, Silicon (eg. Allevyn, etc) 02/26/24 1109   Wound packed? No 02/26/24 1109   Dressing Changed Changed 02/26/24 1109   Patient Tolerance Tolerated well 02/26/24 1109   Dressing Status Clean;Dry;Intact 02/26/24 1109       Wound 02/26/24 MASD Breast Left (Active)   Wound Image   02/26/24 1113   Wound Description Ricardo 02/26/24 1113   [place]uy[place]-wound Assessment Dry;Intact 02/26/24 1113   Wound Length (cm) 0.5 cm 02/26/24 1113   Wound Width (cm) 6 cm 02/26/24 1113   Wound Depth (cm) 0.1 cm 02/26/24 1113   Wound Surface Area (cm^2) 3 cm^2 02/26/24 1113   Wound Volume (cm^3) 0.3 cm^3 02/26/24 1113   Calculated Wound Volume (cm^3) 0.3 cm^3 02/26/24 1113   Drainage Amount Small 02/26/24 1113   Drainage Description Serous 02/26/24 1113   Non-staged Wound Description Partial thickness 02/26/24 1113   Treatments Cleansed;Site care 02/26/24 1113   Dressing Other (Comment);Silver dressing 02/26/24 1113   Wound packed? No 02/26/24 1113    Dressing Changed New 02/26/24 1113   Patient Tolerance Tolerated well 02/26/24 1113   Dressing Status Clean;Dry;Intact 02/26/24 1113       Wound 02/26/24 MASD Pelvis Anterior;Left (Active)   Wound Image   02/26/24 1118   Wound Description [place] 02/26/24 1118   Chuy[place]-wound Assessment Dry;Intact 02/26/24 1118   Wound Length (cm) 0.5 cm 02/26/24 1118   Wound Width (cm) 10 cm 02/26/24 1118   Wound Depth (cm) 0.1 cm 02/26/24 1118   Wound Surface Area (cm^2) 5 cm^2 02/26/24 1118   Wound Volume (cm^3) 0.5 cm^3 02/26/24 1118   Calculated Wound Volume (cm^3) 0.5 cm^3 02/26/24 1118   Drainage Amount Small 02/26/24 1118   Drainage Description Serous 02/26/24 1118   Non-staged Wound Description Partial thickness 02/26/24 1118   Treatments Cleansed;Site care 02/26/24 1118   Dressing Silver dressing;Other (Comment) 02/26/24 1118   Wound packed? No 02/26/24 1118   Dressing Changed New 02/26/24 1118   Patient Tolerance Tolerated well 02/26/24 1118       Call or tigertext with any questions  Wound Care will continue to follow    Natalie KINGN RN CWON  Wound and Ostomy care

## 2024-02-26 NOTE — PLAN OF CARE
Problem: Nutrition/Hydration-ADULT  Goal: Nutrient/Hydration intake appropriate for improving, restoring or maintaining nutritional needs  Description: Monitor and assess patient's nutrition/hydration status for malnutrition. Collaborate with interdisciplinary team and initiate plan and interventions as ordered.  Monitor patient's weight and dietary intake as ordered or per policy. Utilize nutrition screening tool and intervene as necessary. Determine patient's food preferences and provide high-protein, high-caloric foods as appropriate.     INTERVENTIONS:  - Monitor oral intake, urinary output, labs, and treatment plans  - Assess nutrition and hydration status and recommend course of action  - Evaluate amount of meals eaten  - Assist patient with eating if necessary   - Allow adequate time for meals  - Recommend/ encourage appropriate diets, oral nutritional supplements, and vitamin/mineral supplements  - Order, calculate, and assess calorie counts as needed  - Recommend, monitor, and adjust tube feedings and TPN/PPN based on assessed needs  - Assess need for intravenous fluids  - Provide specific nutrition/hydration education as appropriate  - Include patient/family/caregiver in decisions related to nutrition  Outcome: Progressing     Problem: PAIN - ADULT  Goal: Verbalizes/displays adequate comfort level or baseline comfort level  Description: Interventions:  - Encourage patient to monitor pain and request assistance  - Assess pain using appropriate pain scale  - Administer analgesics based on type and severity of pain and evaluate response  - Implement non-pharmacological measures as appropriate and evaluate response  - Consider cultural and social influences on pain and pain management  - Notify physician/advanced practitioner if interventions unsuccessful or patient reports new pain  Outcome: Progressing     Problem: SAFETY ADULT  Goal: Patient will remain free of falls  Description: INTERVENTIONS:  -  Educate patient/family on patient safety including physical limitations  - Instruct patient to call for assistance with activity   - Consult OT/PT to assist with strengthening/mobility   - Keep Call bell within reach  - Keep bed low and locked with side rails adjusted as appropriate  - Keep care items and personal belongings within reach  - Initiate and maintain comfort rounds  - Make Fall Risk Sign visible to staff  - Offer Toileting every 2 Hours, in advance of need  - Initiate/Maintain bed alarm  - Obtain necessary fall risk management equipment:   - Apply yellow socks and bracelet for high fall risk patients  - Consider moving patient to room near nurses station  Outcome: Progressing

## 2024-02-26 NOTE — CASE MANAGEMENT
[place] [facility] has received approved authorization from insurance: [facility]   Called in by Rep: Shantanu SHEPARD#: [phone]  Authorization received for: SNF  Facility: [facility]   Authorization #: [id number]   Start of Care: 02/26  Next Review Date: 02/28 Regina   Continued Stay Care Coordinator: Regina #: [phone]  Submit next review to #: [phone]  Care Manager notified: Marlen Erickson

## 2024-02-26 NOTE — ASSESSMENT & PLAN NOTE
Likely viral etiology, no leukocytosis or other infectious symptoms noted in association  Also has been receiving antibiotics for the last 9 days  Low suspicion for bacterial etiology  Also low suspicion for recurrence of severe cellulitis   Resolved

## 2024-02-26 NOTE — PROGRESS NOTES
[facility]  Progress Note  Name: Robyn Lyons I  MRN: [id number]  Unit/Bed#: MS [facility]-01 I Date of Admission: 2/17/2024   Date of Service: 2/26/2024 I Hospital Day: 9    Assessment/Plan   * Cellulitis of right lower extremity  Assessment & Plan  Significant improvement since initiation of antibiotics  Received IV vancomycin from 2/17-2/21, then transitioned to keflex, with completion of course of antibiotics on 2/26  Does have baseline erythema from chronic venous stasis dermatitis  But overall warmth and tenderness has resolved  Patient was initially supposed to be discharged home   Patient now agreeable to rehab      Fever  Assessment & Plan  Likely viral etiology, no leukocytosis or other infectious symptoms noted in association  Also has been receiving antibiotics for the last 9 days  Low suspicion for bacterial etiology  Also low suspicion for recurrence of severe cellulitis   Resolved    Asymptomatic HIV infection, with no history of HIV-related illness (HCC)  Assessment & Plan  Continue home [place] [phone]    Acute respiratory failure with hypoxia (HCC)  Assessment & Plan  Currently on 3 liters nasal canula  No history of oxygen requirements   Likely in the setting of ANA MARIA and OHS  Non compliant with cpap  Reports having sleep study pending as an outpatient  Home oxygen facilitated, currently on 2 liters    Anemia  Assessment & Plan  Recent Labs     02/24/24  0554 02/25/24  0615 02/26/24  0514   HGB 10.7* 9.9* 9.4*   MCV 86 85 85       Stable Hb  Cont to monitor               VTE Pharmacologic Prophylaxis: VTE Score: 5 High Risk (Score >/= 5) - Pharmacological DVT Prophylaxis Ordered: enoxaparin (Lovenox). Sequential Compression Devices Ordered.    Mobility:   Basic Mobility Inpatient Raw Score: 12  JH-HLM Goal: 4: Move to chair/commode  JH-HLM Achieved: 4: Move to chair/commode  HLM Goal achieved. Continue to encourage appropriate mobility.    Patient Centered Rounds: I  performed bedside rounds with nursing staff today.   Discussions with Specialists or Other Care Team Provider: sadi    Education and Discussions with Family / Patient: Patient declined call to .     Total Time Spent on Date of Encounter in care of patient: 30+ mins. This time was spent on one or more of the following: performing physical exam; counseling and coordination of care; obtaining or reviewing history; documenting in the medical record; reviewing/ordering tests, medications or procedures; communicating with other healthcare professionals and discussing with patient's family/caregivers.    Current Length of Stay: 9 day(s)  Current Patient Status: Inpatient   Certification Statement: The patient will continue to require additional inpatient hospital stay due to placement  Discharge Plan: Anticipate discharge in 24-48 hrs to rehab facility.    Code Status: Level 1 - Full Code    Subjective:   Patient is nodding off from sleep apnea    Objective:     Vitals:   Temp (24hrs), Av.1 °F (36.7 °C), Min:97.5 °F (36.4 °C), Max:98.4 °F (36.9 °C)    Temp:  [97.5 °F (36.4 °C)-98.4 °F (36.9 °C)] 98.4 °F (36.9 °C)  HR:  [79-89] 79  Resp:  [15] 15  BP: (128-136)/(66-68) 135/67  SpO2:  [83 %-96 %] 96 %  Body mass index is 69.06 kg/m².     Input and Output Summary (last 24 hours):     Intake/Output Summary (Last 24 hours) at 2024 1323  Last data filed at 2024 0900  Gross per 24 hour   Intake 240 ml   Output --   Net 240 ml       Physical Exam:   Physical Exam  Constitutional:       General: She is not in acute distress.     Appearance: Normal appearance. She is not toxic-appearing.   Cardiovascular:      Rate and Rhythm: Normal rate and regular rhythm.      Heart sounds: Normal heart sounds. No murmur heard.  Pulmonary:      Effort: Pulmonary effort is normal. No respiratory distress.      Breath sounds: Normal breath sounds. No wheezing.   Abdominal:      General: Abdomen is flat. There is no  distension.      Palpations: Abdomen is soft.      Tenderness: There is no abdominal tenderness.   Neurological:      General: No focal deficit present.      Mental Status: She is oriented to person, place, and time. Mental status is at baseline.      Motor: No weakness.          Additional Data:     Labs:  Results from last 7 days   Lab Units 02/26/24  0514   WBC Thousand/uL 3.80*   HEMOGLOBIN g/dL 9.4*   HEMATOCRIT % 32.5*   PLATELETS Thousands/uL 273   NEUTROS PCT % 49   LYMPHS PCT % 29   MONOS PCT % 14*   EOS PCT % 5     Results from last 7 days   Lab Units 02/26/24  0514   SODIUM mmol/L 140   POTASSIUM mmol/L 4.2   CHLORIDE mmol/L 99   CO2 mmol/L 36*   BUN mg/dL 16   CREATININE mg/dL 0.77   ANION GAP mmol/L 5   CALCIUM mg/dL 9.1   GLUCOSE RANDOM mg/dL 112                 Results from last 7 days   Lab Units 02/20/24  0454   PROCALCITONIN ng/ml 0.06       Lines/Drains:  Invasive Devices       Peripheral Intravenous Line  Duration             Peripheral IV 02/23/24 Left Antecubital 2 days              Drain  Duration             External Urinary Catheter 4 days                          Imaging: No pertinent imaging reviewed.    Recent Cultures (last 7 days):         Last 24 Hours Medication List:   Current Facility-Administered Medications   Medication Dose Route Frequency Provider Last Rate    acetaminophen  650 mg Oral Q6H PRN Som Ceron, DO      albuterol  2.5 mg Nebulization Q6H PRN LUC Downs      baclofen  20 mg Oral TID LUC Mukherjee      Iqfqn-Snabi-Jkpaxpgc-TenofAF  1 tablet Oral Daily Som Ceron,       Diclofenac Sodium  2 g Topical 4x Daily Eran Benedict MD      enoxaparin  60 mg Subcutaneous Q12H [facility] Som Ceron, DO      gabapentin  100 mg Oral TID Som Ceron, DO      hydrALAZINE  10 mg Intravenous Q6H PRN Som Ceron, DO      labetalol  10 mg Intravenous Q6H PRN Som Ceron, DO      ondansetron  4 mg Intravenous Q6H PRN  Daljit Bolton MD      oxyCODONE  30 mg Oral Q6H PRN Eran Benedict MD          Today, Patient Was Seen By: Eran Andre MD    **Please Note: This note may have been constructed using a voice recognition system.**

## 2024-02-26 NOTE — PLAN OF CARE
Problem: PAIN - ADULT  Goal: Verbalizes/displays adequate comfort level or baseline comfort level  Description: Interventions:  - Encourage patient to monitor pain and request assistance  - Assess pain using appropriate pain scale  - Administer analgesics based on type and severity of pain and evaluate response  - Implement non-pharmacological measures as appropriate and evaluate response  - Consider cultural and social influences on pain and pain management  - Notify physician/advanced practitioner if interventions unsuccessful or patient reports new pain  Outcome: Progressing     Problem: SAFETY ADULT  Goal: Maintain or return to baseline ADL function  Description: INTERVENTIONS:  -  Assess patient's ability to carry out ADLs; assess patient's baseline for ADL function and identify physical deficits which impact ability to perform ADLs (bathing, care of mouth/teeth, toileting, grooming, dressing, etc.)  - Assess/evaluate cause of self-care deficits   - Assess range of motion  - Assess patient's mobility; develop plan if impaired  - Assess patient's need for assistive devices and provide as appropriate  - Encourage maximum independence but intervene and supervise when necessary  - Involve family in performance of ADLs  - Assess for home care needs following discharge   - Consider OT consult to assist with ADL evaluation and planning for discharge  - Provide patient education as appropriate  Outcome: Progressing     Problem: DISCHARGE PLANNING  Goal: Discharge to home or other facility with appropriate resources  Description: INTERVENTIONS:  - Identify barriers to discharge w/patient and caregiver  - Arrange for needed discharge resources and transportation as appropriate  - Identify discharge learning needs (meds, wound care, etc.)  - Arrange for interpretive services to assist at discharge as needed  - Refer to Case Management Department for coordinating discharge planning if the patient needs post-hospital  services based on physician/advanced practitioner order or complex needs related to functional status, cognitive ability, or social support system  Outcome: Progressing     Problem: Knowledge Deficit  Goal: Patient/family/caregiver demonstrates understanding of disease process, treatment plan, medications, and discharge instructions  Description: Complete learning assessment and assess knowledge base.  Interventions:  - Provide teaching at level of understanding  - Provide teaching via preferred learning methods  Outcome: Progressing

## 2024-02-26 NOTE — CASE MANAGEMENT
Case Management Discharge Planning Note    Patient name Robyn Lyons  Location /-01 MRN [id number]  : 1970 Date 2024       Current Admission Date: 2024  Current Admission Diagnosis:Cellulitis of right lower extremity   Patient Active Problem List    Diagnosis Date Noted    Fever 2024    Cellulitis of right lower extremity 2024    Class 3 severe obesity due to excess calories with serious comorbidity and body mass index (BMI) of 60.0 to 69.9 in adult (HCC) 2024    Anemia 2024    Acute respiratory failure with hypoxia ([facility]) 2024    Asymptomatic HIV infection, with no history of HIV-related illness ([facility]) 2024    Obesity hypoventilation syndrome (HCC) 2024    Ambulatory dysfunction 2024    Venous insufficiency 2022    Anxiety 2022    Gastroesophageal reflux disease 2022    Hypercholesterolemia 2022    Lower leg edema 2022    Vitamin D deficiency 2022    History of Aris-en-Y gastric bypass 2020      LOS (days): 9  Geometric Mean LOS (GMLOS) (days): 4.8  Days to GMLOS:-3.9     OBJECTIVE:  Risk of Unplanned Readmission Score: 10.36         Current admission status: Inpatient   Preferred Pharmacy:   [facility] - [address]  [address]  [address]  [place] [address]  Phone: [phone] Fax: [phone]    Primary Care Provider: No primary care provider on file.    Primary Insurance: [facility] [place] DUAL COMPLETE  REP  Secondary Insurance: [facility]    DISCHARGE DETAILS:                                                                                                               Facility Insurance Auth Number: [id number]

## 2024-02-26 NOTE — UTILIZATION REVIEW
Continued Stay Review    Date: 2/23                          Current Patient Class: IP   Current Level of Care: MS    HPI:53 y.o. female initially admitted on  2/17 for cellulitis , acute resp failure .     Assessment/Plan:     2/23 IM Note   Pt is currently on 3l NC , will need home O2 testing prior to DC . On po abx -  bc purulent drainage . Warmth and redness has resolved. O2 sat 93-99 % on 4l NC .     2/23 Quick Note   Since discharge order patient has been reporting that she does not feel comfortable going home. She reported chest pain which appears to be chronic based on nursing supervisor report. She has not reported any chest pain over last 3-4 days. Will order stat troponin.      2/23 IM Note   Pt is now agreeable to rehab placement on DC . CM following .    Vital Signs:   02/23/24 23:33:02     Vitals   Temp 99.5 °F (37.5 °C)  -.4 °F (38 °C)  -DI   Temp src -- Oral  -DB   Pulse 95  -  -DI   Resp -- 16  -DI   /63  -/58  -DI   MAP (mmHg) 88  -DI 85  -DI   BP Location -- Right arm  -DB   BP Method -- Automatic  -DB   Patient Position - Orthostatic VS -- Lying  -DB   SpO2 96 %  -DI 93 %  -DI   SpO2 Activity -- At Rest  -DB   O2 Device -- Nasal cannula  -DB   Nasal Cannula O2 Flow Rate (L/min) -- 4 L/min  -DB   MAP (mmHg) 88  -DI 85  -DI   Pain       Pertinent Labs/Diagnostic Results:     Results from last 7 days   Lab Units 02/23/24  0628 02/22/24  0600   WBC Thousand/uL 5.58 3.64*   HEMOGLOBIN g/dL 11.2* 9.9*   HEMATOCRIT % 38.7 34.4*   PLATELETS Thousands/uL 332 276   NEUTROS ABS Thousands/µL 2.98 1.67*         Results from last 7 days   Lab Units 02/23/24  0628 02/22/24  0600   SODIUM mmol/L 139 140   POTASSIUM mmol/L 4.3 4.3   CHLORIDE mmol/L 101 104   CO2 mmol/L 32 30   ANION GAP mmol/L 6 6   BUN mg/dL 16 10   CREATININE mg/dL 1.04 0.74   EGFR ml/min/1.73sq m 61 92   CALCIUM mg/dL 9.7 9.0   MAGNESIUM mg/dL 2.1 2.0             Results from last 7 days   Lab Units 02/23/24  0666  02/22/24  0600 02/20/24  0454   GLUCOSE RANDOM mg/dL 119 97 91         Results from last 7 days   Lab Units 02/22/24  0600 02/22/24  0404 02/22/24  0203   HS TNI 0HR ng/L  --   --  2   HS TNI 2HR ng/L  --  2  --    HSTNI D2 ng/L  --  0  --    HS TNI 4HR ng/L 4  --   --    HSTNI D4 ng/L 2  --   --          Results from last 7 days   Lab Units 02/20/24  0454   PROCALCITONIN ng/ml 0.06         Medications:   Scheduled Medications:  baclofen, 20 mg, Oral, TID  cephalexin, 500 mg, Oral, Q6H LISS  Eeypc-Bkjzs-Fbeeeuku-TenofAF, 1 tablet, Oral, Daily  Diclofenac Sodium, 2 g, Topical, 4x Daily  enoxaparin, 60 mg, Subcutaneous, Q12H LISS  gabapentin, 100 mg, Oral, TID      Continuous IV Infusions:     PRN Meds:  acetaminophen, 650 mg, Oral, Q6H PRN  albuterol, 2.5 mg, Nebulization, Q6H PRN  hydrALAZINE, 10 mg, Intravenous, Q6H PRN  labetalol, 10 mg, Intravenous, Q6H PRN  ondansetron, 4 mg, Intravenous, Q6H PRN  oxyCODONE, 30 mg, Oral, Q6H PRN        Discharge Plan: D     Network Utilization Review Department  ATTENTION: Please call with any questions or concerns to [phone] and carefully listen to the prompts so that you are directed to the right person. All voicemails are confidential.   For Discharge needs, contact Care Management DC Support Team at [phone] opt. 2  Send all requests for admission clinical reviews, approved or denied determinations and any other requests to dedicated fax number below belonging to the campus where the patient is receiving treatment. List of dedicated fax numbers for the Facilities:  FACILITY NAME UR FAX NUMBER   ADMISSION DENIALS (Administrative/Medical Necessity) [phone]   DISCHARGE SUPPORT TEAM (NETWORK) [phone]   PARENT CHILD HEALTH (Maternity/NICU/Pediatrics) [phone]   [facility] [phone]   [facility] [phone]   [facility] [phone]   [facility]  [phone]   [facility] [phone]   [facility] [phone]   [facility] [phone]   [facility] [phone]   [facility] [phone]   [facility] [phone]   [facility] [phone]   [facility] [phone]

## 2024-02-26 NOTE — CASE MANAGEMENT
[place] [facility] received request for authorization from Care Manager.  Authorization request for: SNF  Facility Name: [facility] NPI: [id number]  Facility MD: Johnna Pinto NPI: [id number]  Authorization initiated by contacting insurance: [facility]  Via: [employer]   Clinicals submitted via: fax # [phone]  Pending Reference #: [id number]  CM notified: Marlen Erickson

## 2024-02-26 NOTE — ASSESSMENT & PLAN NOTE
Recent Labs     02/24/24  0554 02/25/24  0615 02/26/24  0514   HGB 10.7* 9.9* 9.4*   MCV 86 85 85       Stable Hb  Cont to monitor

## 2024-02-27 VITALS
BODY MASS INDEX: 45.99 KG/M2 | HEIGHT: 67 IN | DIASTOLIC BLOOD PRESSURE: 75 MMHG | WEIGHT: 293 LBS | RESPIRATION RATE: 18 BRPM | TEMPERATURE: 98.4 F | SYSTOLIC BLOOD PRESSURE: 98 MMHG | OXYGEN SATURATION: 86 % | HEART RATE: 78 BPM

## 2024-02-27 LAB
ANION GAP SERPL CALCULATED.3IONS-SCNC: 7 MMOL/L
BASOPHILS # BLD AUTO: 0.04 THOUSANDS/ÂΜL (ref 0–0.1)
BASOPHILS NFR BLD AUTO: 1 % (ref 0–1)
BUN SERPL-MCNC: 16 MG/DL (ref 5–25)
CALCIUM SERPL-MCNC: 9.2 MG/DL (ref 8.4–10.2)
CHLORIDE SERPL-SCNC: 100 MMOL/L (ref 96–108)
CO2 SERPL-SCNC: 34 MMOL/L (ref 21–32)
CREAT SERPL-MCNC: 0.77 MG/DL (ref 0.6–1.3)
EOSINOPHIL # BLD AUTO: 0.21 THOUSAND/ÂΜL (ref 0–0.61)
EOSINOPHIL NFR BLD AUTO: 5 % (ref 0–6)
ERYTHROCYTE [DISTWIDTH] IN BLOOD BY AUTOMATED COUNT: 17.4 % (ref 11.6–15.1)
GFR SERPL CREATININE-BSD FRML MDRD: 88 ML/MIN/1.73SQ M
GLUCOSE SERPL-MCNC: 112 MG/DL (ref 65–140)
HCT VFR BLD AUTO: 33.3 % (ref 34.8–46.1)
HGB BLD-MCNC: 9.6 G/DL (ref 11.5–15.4)
IMM GRANULOCYTES # BLD AUTO: 0.09 THOUSAND/UL (ref 0–0.2)
IMM GRANULOCYTES NFR BLD AUTO: 2 % (ref 0–2)
LYMPHOCYTES # BLD AUTO: 1.22 THOUSANDS/ÂΜL (ref 0.6–4.47)
LYMPHOCYTES NFR BLD AUTO: 30 % (ref 14–44)
MAGNESIUM SERPL-MCNC: 1.9 MG/DL (ref 1.9–2.7)
MCH RBC QN AUTO: 24.2 PG (ref 26.8–34.3)
MCHC RBC AUTO-ENTMCNC: 28.8 G/DL (ref 31.4–37.4)
MCV RBC AUTO: 84 FL (ref 82–98)
MONOCYTES # BLD AUTO: 0.6 THOUSAND/ÂΜL (ref 0.17–1.22)
MONOCYTES NFR BLD AUTO: 15 % (ref 4–12)
NEUTROPHILS # BLD AUTO: 1.93 THOUSANDS/ÂΜL (ref 1.85–7.62)
NEUTS SEG NFR BLD AUTO: 47 % (ref 43–75)
NRBC BLD AUTO-RTO: 1 /100 WBCS
PLATELET # BLD AUTO: 292 THOUSANDS/UL (ref 149–390)
PMV BLD AUTO: 9.6 FL (ref 8.9–12.7)
POTASSIUM SERPL-SCNC: 4.1 MMOL/L (ref 3.5–5.3)
RBC # BLD AUTO: 3.97 MILLION/UL (ref 3.81–5.12)
SODIUM SERPL-SCNC: 141 MMOL/L (ref 135–147)
WBC # BLD AUTO: 4.09 THOUSAND/UL (ref 4.31–10.16)

## 2024-02-27 PROCEDURE — 85025 COMPLETE CBC W/AUTO DIFF WBC: CPT | Performed by: STUDENT IN AN ORGANIZED HEALTH CARE EDUCATION/TRAINING PROGRAM

## 2024-02-27 PROCEDURE — 83735 ASSAY OF MAGNESIUM: CPT | Performed by: STUDENT IN AN ORGANIZED HEALTH CARE EDUCATION/TRAINING PROGRAM

## 2024-02-27 PROCEDURE — 80048 BASIC METABOLIC PNL TOTAL CA: CPT | Performed by: STUDENT IN AN ORGANIZED HEALTH CARE EDUCATION/TRAINING PROGRAM

## 2024-02-27 PROCEDURE — 99239 HOSP IP/OBS DSCHRG MGMT >30: CPT | Performed by: FAMILY MEDICINE

## 2024-02-27 RX ORDER — GABAPENTIN 100 MG/1
100 CAPSULE ORAL 3 TIMES DAILY
Start: 2024-02-27

## 2024-02-27 RX ADMIN — DARUNAVIR, COBICISTAT, EMTRICITABINE, AND TENOFOVIR ALAFENAMIDE 1 TABLET: 800; 150; 200; 10 TABLET, FILM COATED ORAL at 09:14

## 2024-02-27 RX ADMIN — OLANZAPINE 2.5 MG: 2.5 TABLET, FILM COATED ORAL at 04:44

## 2024-02-27 RX ADMIN — GABAPENTIN 100 MG: 100 CAPSULE ORAL at 09:12

## 2024-02-27 RX ADMIN — OXYCODONE HYDROCHLORIDE 30 MG: 10 TABLET ORAL at 04:44

## 2024-02-27 RX ADMIN — Medication 2 G: at 09:14

## 2024-02-27 RX ADMIN — ENOXAPARIN SODIUM 60 MG: 60 INJECTION SUBCUTANEOUS at 09:19

## 2024-02-27 RX ADMIN — BACLOFEN 20 MG: 10 TABLET ORAL at 09:12

## 2024-02-27 RX ADMIN — OXYCODONE HYDROCHLORIDE 30 MG: 10 TABLET ORAL at 09:18

## 2024-02-27 NOTE — ASSESSMENT & PLAN NOTE
Currently on 3-4 liters nasal canula  No history of oxygen requirements   Likely in the setting of ANA MARIA and OHS  Non compliant with cpap  Reports having sleep study pending as an outpatient

## 2024-02-27 NOTE — CASE MANAGEMENT
Case Management Discharge Planning Note    Patient name Robyn Lyons  Location /-01 MRN [id number]  : 1970 Date 2024       Current Admission Date: 2024  Current Admission Diagnosis:Cellulitis of right lower extremity   Patient Active Problem List    Diagnosis Date Noted    Fever 2024    Cellulitis of right lower extremity 2024    Class 3 severe obesity due to excess calories with serious comorbidity and body mass index (BMI) of 60.0 to 69.9 in adult (HCC) 2024    Anemia 2024    Acute respiratory failure with hypoxia ([facility]) 2024    Asymptomatic HIV infection, with no history of HIV-related illness ([facility]) 2024    Obesity hypoventilation syndrome (HCC) 2024    Ambulatory dysfunction 2024    Venous insufficiency 2022    Anxiety 2022    Gastroesophageal reflux disease 2022    Hypercholesterolemia 2022    Lower leg edema 2022    Vitamin D deficiency 2022    History of Aris-en-Y gastric bypass 2020      LOS (days): 10  Geometric Mean LOS (GMLOS) (days): 4.8  Days to GMLOS:-4.7     OBJECTIVE:  Risk of Unplanned Readmission Score: 12.21         Current admission status: Inpatient   Preferred Pharmacy:   [facility] - [address]  [address]  [address]  [place] [address]  Phone: [phone] Fax: [phone]    Primary Care Provider: No primary care provider on file.    Primary Insurance: [facility] [place] DUAL COMPLETE  REP  Secondary Insurance: [facility]    DISCHARGE DETAILS:    Discharge planning discussed with:: Pt at bedside  [place] of Choice: Yes  Comments - Freedom of Choice: Auth was obtained for pt to go to [facility]. Pt has transportation arranged for a 1pm  with [facility]. Pt is aware that she needs to bring her HIV medication with her in which she has at bedside. Ananya made aware via [employer]. CM continues to follow and  assist with pt dc plans.  CM contacted family/caregiver?: Yes  Were Treatment Team discharge recommendations reviewed with patient/caregiver?: Yes  Did patient/caregiver verbalize understanding of patient care needs?: Yes  Were patient/caregiver advised of the risks associated with not following Treatment Team discharge recommendations?: Yes    Contacts  Patient Contacts: Peewee Lyons  Relationship to Patient:: Family  Contact Method: Phone, In Person  Phone Number: [phone]  Reason/Outcome: Continuity of Care, Emergency Contact, Referral, Discharge Planning    Requested Home Health Care         Is the patient interested in HHC at discharge?: No       Treatment Team Recommendation: Short Term Rehab  Discharge Destination Plan:: Short Term Rehab  Transport at Discharge : [facility]  Dispatcher Contacted: Yes  Number/Name of Dispatcher: MERLENE     ETA of Transport (Date): 02/27/24  ETA of Transport (Time): 1300              Accepting Facility Name, City & State : [address] [address]  Receiving Facility/Agency Phone Number: Phone: [phone]  Facility/Agency Fax Number: Fax: [phone]

## 2024-02-27 NOTE — DISCHARGE SUMMARY
[facility]  Discharge- Robyn Lyons 1970, 53 y.o. female MRN: [id number]  Unit/Bed#: [facility] Encounter: [id number]  Primary Care Provider: No primary care provider on file.   Date and time admitted to hospital: 2/17/2024  7:44 PM    Acute respiratory failure with hypoxia (HCC)  Assessment & Plan  Currently on 3-4 liters nasal canula  No history of oxygen requirements   Likely in the setting of ANA MARIA and OHS  Non compliant with cpap  Reports having sleep study pending as an outpatient      Asymptomatic HIV infection, with no history of HIV-related illness (HCC)  Assessment & Plan  Continue home Symtuza [phone]    * Cellulitis of right lower extremity  Assessment & Plan  Significant improvement since initiation of antibiotics  Received IV vancomycin from 2/17-2/21, then transitioned to keflex, with completion of course of antibiotics on 2/26  Does have baseline erythema from chronic venous stasis dermatitis  But overall warmth and tenderness has resolved  Patient was initially supposed to be discharged home , now agreeable to rehab  Going to [facility]       Ambulatory dysfunction  Assessment & Plan  Seen by PT/OT  Plan to go to [facility] today    Obesity hypoventilation syndrome (HCC)  Assessment & Plan  Patient complaining of shortness of breath while sleeping  May be attributed to obesity  Trial CPAP HS- she is non compliant with it during hospitalization  She has Cpap ordered but refuses to wear it  Will need OP fu with pulmonology    Anemia  Assessment & Plan  Recent Labs     02/25/24  0615 02/26/24  0514 02/27/24  0503   HGB 9.9* 9.4* 9.6*   MCV 85 85 84       Stable Hb  Cont to monitor      Class 3 severe obesity due to excess calories with serious comorbidity and body mass index (BMI) of 60.0 to 69.9 in adult ([facility])  Assessment & Plan  Body mass index is 69.06 kg/m².    Recommend incorporating a more whole foods plant-predominant diet along with decreasing consumption of red  meats and processed foods  Per AHA guidelines, recommend moderate-vigorous intensity exercise for 30 minutes a day for 5 days a week or a total of 150 min/week      Discharging Physician / Practitioner: Aicha Baird MD  PCP: No primary care provider on file.  Admission Date:   Admission Orders (From admission, onward)       Ordered        02/17/24 2213  Inpatient Admission  Once                          Discharge Date: 02/27/24    Disposition:      STR    Reason for Admission: rt LE pain    Discharge Diagnoses:     Please see assessment and plan section above for further details regarding discharge diagnoses.     Medical Problems       Resolved Problems  Date Reviewed: 2/19/2024   None         Medication Adjustments and Discharge Medications:  Summary of Medication Adjustments made as a result of this hospitalization: see med rec  Medication Dosing Tapers - Please refer to Discharge Medication List for details on any medication dosing tapers (if applicable to patient).  Medications being temporarily held (include recommended restart time): see med rec  Discharge Medication List: See after visit summary for reconciled discharge medications.       Diet Recommendations at Discharge:  Diet -        Diet Orders   (From admission, onward)                 Start     Ordered    02/17/24 2223  Diet Regular; Regular House  Diet effective now        References:    Adult Nutrition Support Algorithm    RD Therapeutic Diet Order Protocol   Question Answer Comment   Diet Type Regular    Regular Regular House    RD to adjust diet per protocol? Yes        02/17/24 2222                      Hospital Course:     Robyn Lyons is a 53 y.o. female patient who originally presented to the hospital on 2/17/2024 with underlying history of HIV/morbid obesity/obesity hypoventilation syndrome who presents with worsening right lower extremity pain swelling redness.  She reported she had a blister in her right lower extremity which popped a  "few days prior to presentation which she was treating with OTC medications but noticed worsening symptoms.  She denied any fevers chills.  She was admitted for IV antibiotics.    -For her cellulitis right lower extremity, significant improvement since initiation of antibiotics.  Initially received IV vancomycin and then transition to Keflex with completion of antibiotics on 2/26.  She does have chronic underlying venous stasis dermatitis.    -Also noted to have acute hypoxic respiratory failure.  Not on O2 typically at home.  Likely in the setting of ANA MARIA/OHS.  Patient noncompliant with CPAP.  Requiring 3 to 4 L nasal cannula.  Has outpatient sleep study pending currently.    - She was seen by PT OT with recommendation to go to rehab.  Patient going to [place] today.    - Above plan of care discussed in detail with patient.  She verbalizes understanding and is in agreement.  DC to [place].    Condition at Discharge: fair     Discharge Day Visit / Exam:     Vitals: Blood Pressure: 98/75 (02/27/24 0811)  Pulse: 78 (02/27/24 0811)  Temperature: 98.4 °F (36.9 °C) (02/27/24 0811)  Temp Source: Oral (02/23/24 2133)  Respirations: 18 (02/26/24 2215)  Height: 5' 7\" (170.2 cm) (02/17/24 2351)  Weight - Scale: (!) 200 kg (440 lb 14.7 oz) (02/17/24 2351)  SpO2: (!) 86 % (02/27/24 0811)  Exam:   Physical Exam  Constitutional:       General: She is not in acute distress.     Appearance: She is obese. She is not toxic-appearing.   HENT:      Mouth/Throat:      Mouth: Mucous membranes are moist.      Pharynx: Oropharynx is clear.   Cardiovascular:      Rate and Rhythm: Normal rate and regular rhythm.      Pulses: Normal pulses.   Pulmonary:      Effort: Pulmonary effort is normal. No respiratory distress.      Breath sounds: Normal breath sounds.   Abdominal:      General: Abdomen is flat. Bowel sounds are normal.      Palpations: Abdomen is soft.      Tenderness: There is no abdominal tenderness. There is no guarding. "   Musculoskeletal:      Right lower leg: Edema present.      Left lower leg: Edema present.      Comments: Erythema noted B/L LE likely due to chronic venous stasis   Neurological:      General: No focal deficit present.      Mental Status: She is alert and oriented to person, place, and time.           Goals of Care Discussions:  Code Status at Discharge: Level 1 - Full Code    Discharge instructions/Information to patient and family:   See after visit summary section titled Discharge Instructions for information provided to patient and family.       Discharge Statement:  I spent 40 minutes discharging the patient. This time was spent on the day of discharge. I had direct contact with the patient on the day of discharge. Greater than 50% of the total time was spent examining patient, answering all patient questions, arranging and discussing plan of care with patient as well as directly providing post-discharge instructions.  Additional time then spent on discharge activities.    ** Please Note: This note has been constructed using a voice recognition system **

## 2024-02-27 NOTE — PLAN OF CARE
Problem: SAFETY ADULT  Goal: Patient will remain free of falls  Description: INTERVENTIONS:  - Educate patient/family on patient safety including physical limitations  - Instruct patient to call for assistance with activity   - Consult OT/PT to assist with strengthening/mobility   - Keep Call bell within reach  - Keep bed low and locked with side rails adjusted as appropriate  - Keep care items and personal belongings within reach  - Initiate and maintain comfort rounds  - Make Fall Risk Sign visible to staff  - Offer Toileting every 2 Hours, in advance of need  - Initiate/Maintain bed alarm  Problem: PAIN - ADULT  Goal: Verbalizes/displays adequate comfort level or baseline comfort level  Description: Interventions:  - Encourage patient to monitor pain and request assistance  - Assess pain using appropriate pain scale  - Administer analgesics based on type and severity of pain and evaluate response  - Implement non-pharmacological measures as appropriate and evaluate response  - Consider cultural and social influences on pain and pain management  - Notify physician/advanced practitioner if interventions unsuccessful or patient reports new pain  Outcome: Progressing     - Apply yellow socks and bracelet for high fall risk patients  - Consider moving patient to room near nurses station  Outcome: Progressing

## 2024-02-27 NOTE — ASSESSMENT & PLAN NOTE
Patient complaining of shortness of breath while sleeping  May be attributed to obesity  Trial CPAP HS- she is non compliant with it during hospitalization  She has Cpap ordered but refuses to wear it  Will need OP fu with pulmonology

## 2024-02-27 NOTE — ASSESSMENT & PLAN NOTE
Recent Labs     02/25/24  0615 02/26/24  0514 02/27/24  0503   HGB 9.9* 9.4* 9.6*   MCV 85 85 84       Stable Hb  Cont to monitor

## 2024-02-28 ENCOUNTER — HOSPITAL ENCOUNTER (EMERGENCY)
Facility: HOSPITAL | Age: 54
Discharge: HOME | End: 2024-02-29
Attending: EMERGENCY MEDICINE
Payer: COMMERCIAL

## 2024-02-28 DIAGNOSIS — S81.801A WOUND OF RIGHT LOWER EXTREMITY, INITIAL ENCOUNTER: ICD-10-CM

## 2024-02-28 DIAGNOSIS — R06.02 SHORTNESS OF BREATH: Primary | ICD-10-CM

## 2024-02-28 PROCEDURE — 99285 EMERGENCY DEPT VISIT HI MDM: CPT

## 2024-02-28 PROCEDURE — 93005 ELECTROCARDIOGRAM TRACING: CPT

## 2024-02-28 NOTE — UTILIZATION REVIEW
NOTIFICATION OF ADMISSION DISCHARGE   This is a Notification of Discharge from [facility]. Please be advised that this patient has been discharge from our facility. Below you will find the admission and discharge date and time including the patient’s disposition.   UTILIZATION REVIEW CONTACT:  Nury Farmer  Utilization   Network Utilization Review Department  Phone: [phone] x carefully listen to the prompts. All voicemails are confidential.  Email: [email or web address]     ADMISSION INFORMATION  PRESENTATION DATE: 2/17/2024  7:44 PM  OBERVATION ADMISSION DATE:   INPATIENT ADMISSION DATE: 2/17/24 10:13 PM   DISCHARGE DATE: 2/27/2024  3:01 PM   DISPOSITION:Non [facility] SNF/TCU/SNU    Network Utilization Review Department  ATTENTION: Please call with any questions or concerns to [phone] and carefully listen to the prompts so that you are directed to the right person. All voicemails are confidential.   For Discharge needs, contact Care Management DC Support Team at [phone] opt. 2  Send all requests for admission clinical reviews, approved or denied determinations and any other requests to dedicated fax number below belonging to the campus where the patient is receiving treatment. List of dedicated fax numbers for the Facilities:  FACILITY NAME UR FAX NUMBER   ADMISSION DENIALS (Administrative/Medical Necessity) [phone]   DISCHARGE SUPPORT TEAM ([facility]) [phone]   PARENT CHILD HEALTH (Maternity/NICU/Pediatrics) [phone]   [facility] [phone]   [facility] [phone]   [facility] [phone]   [facility] [phone]   [facility] [phone]   [facility] [phone]   [facility] [phone]   [facility]  [phone]   [facility] [phone]   [facility] [phone]   [facility] [phone]   [facility] [phone]

## 2024-02-29 ENCOUNTER — APPOINTMENT (EMERGENCY)
Dept: RADIOLOGY | Facility: HOSPITAL | Age: 54
End: 2024-02-29
Payer: COMMERCIAL

## 2024-02-29 ENCOUNTER — APPOINTMENT (EMERGENCY)
Dept: CT IMAGING | Facility: HOSPITAL | Age: 54
End: 2024-02-29
Payer: COMMERCIAL

## 2024-02-29 VITALS
TEMPERATURE: 98.3 F | RESPIRATION RATE: 22 BRPM | HEART RATE: 87 BPM | SYSTOLIC BLOOD PRESSURE: 139 MMHG | DIASTOLIC BLOOD PRESSURE: 72 MMHG | OXYGEN SATURATION: 97 %

## 2024-02-29 LAB
2HR DELTA HS TROPONIN: -3 NG/L
ALBUMIN SERPL BCP-MCNC: 4.1 G/DL (ref 3.5–5)
ALP SERPL-CCNC: 44 U/L (ref 34–104)
ALT SERPL W P-5'-P-CCNC: 43 U/L (ref 7–52)
ANION GAP SERPL CALCULATED.3IONS-SCNC: 8 MMOL/L
APTT PPP: 21 SECONDS (ref 23–37)
AST SERPL W P-5'-P-CCNC: 53 U/L (ref 13–39)
BASOPHILS # BLD AUTO: 0.04 THOUSANDS/ÂΜL (ref 0–0.1)
BASOPHILS NFR BLD AUTO: 1 % (ref 0–1)
BILIRUB SERPL-MCNC: 0.54 MG/DL (ref 0.2–1)
BNP SERPL-MCNC: 20 PG/ML (ref 0–100)
BUN SERPL-MCNC: 11 MG/DL (ref 5–25)
CALCIUM SERPL-MCNC: 9.4 MG/DL (ref 8.4–10.2)
CARDIAC TROPONIN I PNL SERPL HS: 3 NG/L
CARDIAC TROPONIN I PNL SERPL HS: 6 NG/L
CHLORIDE SERPL-SCNC: 99 MMOL/L (ref 96–108)
CO2 SERPL-SCNC: 32 MMOL/L (ref 21–32)
CREAT SERPL-MCNC: 0.81 MG/DL (ref 0.6–1.3)
EOSINOPHIL # BLD AUTO: 0.14 THOUSAND/ÂΜL (ref 0–0.61)
EOSINOPHIL NFR BLD AUTO: 3 % (ref 0–6)
ERYTHROCYTE [DISTWIDTH] IN BLOOD BY AUTOMATED COUNT: 17.9 % (ref 11.6–15.1)
GFR SERPL CREATININE-BSD FRML MDRD: 83 ML/MIN/1.73SQ M
GLUCOSE SERPL-MCNC: 109 MG/DL (ref 65–140)
HCG SERPL QL: NEGATIVE
HCT VFR BLD AUTO: 32.3 % (ref 34.8–46.1)
HGB BLD-MCNC: 9.4 G/DL (ref 11.5–15.4)
IMM GRANULOCYTES # BLD AUTO: 0.06 THOUSAND/UL (ref 0–0.2)
IMM GRANULOCYTES NFR BLD AUTO: 2 % (ref 0–2)
INR PPP: 0.89 (ref 0.84–1.19)
LACTATE SERPL-SCNC: 1.8 MMOL/L (ref 0.5–2)
LYMPHOCYTES # BLD AUTO: 1.2 THOUSANDS/ÂΜL (ref 0.6–4.47)
LYMPHOCYTES NFR BLD AUTO: 30 % (ref 14–44)
MCH RBC QN AUTO: 24 PG (ref 26.8–34.3)
MCHC RBC AUTO-ENTMCNC: 29.1 G/DL (ref 31.4–37.4)
MCV RBC AUTO: 83 FL (ref 82–98)
MONOCYTES # BLD AUTO: 0.63 THOUSAND/ÂΜL (ref 0.17–1.22)
MONOCYTES NFR BLD AUTO: 16 % (ref 4–12)
NEUTROPHILS # BLD AUTO: 2 THOUSANDS/ÂΜL (ref 1.85–7.62)
NEUTS SEG NFR BLD AUTO: 48 % (ref 43–75)
NRBC BLD AUTO-RTO: 1 /100 WBCS
PLATELET # BLD AUTO: 303 THOUSANDS/UL (ref 149–390)
PMV BLD AUTO: 9.5 FL (ref 8.9–12.7)
POTASSIUM SERPL-SCNC: 4.6 MMOL/L (ref 3.5–5.3)
PROCALCITONIN SERPL-MCNC: 0.11 NG/ML
PROT SERPL-MCNC: 7.9 G/DL (ref 6.4–8.4)
PROTHROMBIN TIME: 12.6 SECONDS (ref 11.6–14.5)
RBC # BLD AUTO: 3.91 MILLION/UL (ref 3.81–5.12)
SODIUM SERPL-SCNC: 139 MMOL/L (ref 135–147)
WBC # BLD AUTO: 4.07 THOUSAND/UL (ref 4.31–10.16)

## 2024-02-29 PROCEDURE — 36415 COLL VENOUS BLD VENIPUNCTURE: CPT

## 2024-02-29 PROCEDURE — 74177 CT ABD & PELVIS W/CONTRAST: CPT

## 2024-02-29 PROCEDURE — 83880 ASSAY OF NATRIURETIC PEPTIDE: CPT

## 2024-02-29 PROCEDURE — 85025 COMPLETE CBC W/AUTO DIFF WBC: CPT

## 2024-02-29 PROCEDURE — 84484 ASSAY OF TROPONIN QUANT: CPT

## 2024-02-29 PROCEDURE — 87040 BLOOD CULTURE FOR BACTERIA: CPT

## 2024-02-29 PROCEDURE — 99285 EMERGENCY DEPT VISIT HI MDM: CPT

## 2024-02-29 PROCEDURE — 85610 PROTHROMBIN TIME: CPT

## 2024-02-29 PROCEDURE — 71045 X-RAY EXAM CHEST 1 VIEW: CPT

## 2024-02-29 PROCEDURE — 80053 COMPREHEN METABOLIC PANEL: CPT

## 2024-02-29 PROCEDURE — 84703 CHORIONIC GONADOTROPIN ASSAY: CPT

## 2024-02-29 PROCEDURE — 83605 ASSAY OF LACTIC ACID: CPT

## 2024-02-29 PROCEDURE — 85730 THROMBOPLASTIN TIME PARTIAL: CPT

## 2024-02-29 PROCEDURE — 71260 CT THORAX DX C+: CPT

## 2024-02-29 PROCEDURE — 84145 PROCALCITONIN (PCT): CPT

## 2024-02-29 PROCEDURE — 94761 N-INVAS EAR/PLS OXIMETRY MLT: CPT

## 2024-02-29 RX ORDER — CEPHALEXIN 250 MG/1
500 CAPSULE ORAL ONCE
Status: COMPLETED | OUTPATIENT
Start: 2024-02-29 | End: 2024-02-29

## 2024-02-29 RX ORDER — CEPHALEXIN 500 MG/1
500 CAPSULE ORAL EVERY 6 HOURS SCHEDULED
Qty: 16 CAPSULE | Refills: 0 | Status: SHIPPED | OUTPATIENT
Start: 2024-02-29 | End: 2024-03-04

## 2024-02-29 RX ADMIN — IOHEXOL 100 ML: 350 INJECTION, SOLUTION INTRAVENOUS at 06:50

## 2024-02-29 RX ADMIN — CEPHALEXIN 500 MG: 250 CAPSULE ORAL at 07:48

## 2024-02-29 NOTE — ED PROVIDER NOTES
History  Chief Complaint   Patient presents with    Shortness of Breath     Patient arrives to the ER with complaints of shortness of breath. Pt. States O2 was not delivered and she was recently discharged from rehab. Lungs clear. Pt wears 3lnc oxygen. Pt also has a chronic wound to RLE.      The patient is a 53 y.o. female with a history of HIV, acute respiratory failure, who presents to [facility] with a chief complaint of hypoxia. Symptoms began after leaving rehab. She states that she was discharged from the hospital to [facility]. She states that she was supposed to have home O2 but when she went to the rehab they never dropped off her home O2. Patient states that she did not like it at the rehab and only stayed a day. She went home and due to not having her home O2 was becoming short of breath with daily activities. She states that she was in the hospital for cellulitis of the right lower leg. Associated symptoms include shortness of breath. Symptoms are aggravated with movement and alleviating factors include none noted. The patient denies fever, chills, night sweats, chest pain, palpitations, . No other reported symptoms at this time.  Patient denies allergies to anything           History provided by:  Patient   used: No        Prior to Admission Medications   Prescriptions Last Dose Informant Patient Reported? Taking?   Pppop-Clxzk-Tnnclsgm-TenofAF (Symtuza) [phone] MG TABS   Yes No   Sig: Take 1 tablet by mouth daily   baclofen 20 mg tablet   Yes No   Sig: Take 20 mg by mouth Three times a day   gabapentin (NEURONTIN) 100 mg capsule   No No   Sig: Take 1 capsule (100 mg total) by mouth 3 (three) times a day      Facility-Administered Medications: None       Past Medical History:   Diagnosis Date    HIV (human immunodeficiency virus infection) ([facility])     Leg wound, right     Obesity        History reviewed. No pertinent surgical  history.    History reviewed. No pertinent family history.  I have reviewed and agree with the history as documented.    E-Cigarette/Vaping     E-Cigarette/Vaping Substances     Social History     Tobacco Use    Smoking status: Never    Smokeless tobacco: Never   Substance Use Topics    Alcohol use: Never    Drug use: Never       Review of Systems   Constitutional:  Negative for chills and fever.   HENT:  Negative for ear pain and sore throat.    Eyes:  Negative for pain and visual disturbance.   Respiratory:  Positive for shortness of breath. Negative for cough, choking, chest tightness, wheezing and stridor.    Cardiovascular:  Positive for leg swelling. Negative for chest pain and palpitations.   Gastrointestinal:  Negative for abdominal pain and vomiting.   Genitourinary:  Negative for dysuria and hematuria.   Musculoskeletal:  Positive for myalgias. Negative for arthralgias and back pain.   Skin:  Positive for wound. Negative for color change and rash.   Neurological:  Negative for seizures and syncope.   All other systems reviewed and are negative.      Physical Exam  Physical Exam  Vitals reviewed.   Constitutional:       Appearance: She is obese. She is ill-appearing.   HENT:      Head: Normocephalic and atraumatic.      Mouth/Throat:      Mouth: Mucous membranes are moist.   Eyes:      Pupils: Pupils are equal, round, and reactive to light.   Cardiovascular:      Rate and Rhythm: Normal rate.   Pulmonary:      Effort: Pulmonary effort is normal. No tachypnea, bradypnea, accessory muscle usage or respiratory distress.      Breath sounds: Normal breath sounds. No decreased breath sounds, wheezing, rhonchi or rales.   Chest:      Chest wall: No tenderness.   Abdominal:      General: Abdomen is protuberant. Bowel sounds are normal.      Palpations: Abdomen is soft.       Musculoskeletal:      Cervical back: Normal range of motion.      Right lower leg: Tenderness present. Edema present.      Left lower leg: No  tenderness. Edema present.        Legs:    Skin:     General: Skin is warm.      Capillary Refill: Capillary refill takes less than 2 seconds.      Coloration: Skin is not cyanotic.      Findings: No ecchymosis.   Neurological:      Mental Status: She is alert and oriented to person, place, and time.         Vital Signs  ED Triage Vitals   Temperature Pulse Respirations Blood Pressure SpO2   02/29/24 0046 02/29/24 0015 02/29/24 0015 02/29/24 0021 02/29/24 0015   98.3 °F (36.8 °C) 82 15 147/91 96 %      Temp src Heart Rate Source Patient Position - Orthostatic VS BP Location FiO2 (%)   -- -- -- -- --             Pain Score       --                  Vitals:    02/29/24 0345 02/29/24 0445 02/29/24 0500 02/29/24 0600   BP:       Pulse: 81 85 91 81         Visual Acuity      ED Medications  Medications - No data to display    Diagnostic Studies  Results Reviewed       Procedure Component Value Units Date/Time    hCG, qualitative pregnancy [[id number]] Collected: 02/29/24 0007    Lab Status: In process Specimen: Blood from Arm, Left Updated: 02/29/24 0610    HS Troponin I 2hr [[id number]]  (Normal) Collected: 02/29/24 0241    Lab Status: Final result Specimen: Blood from Arm, Right Updated: 02/29/24 0309     hs TnI 2hr 3 ng/L      Delta 2hr hsTnI -3 ng/L     B-Type Natriuretic Peptide(BNP) [[id number]]  (Normal) Collected: 02/29/24 0007    Lab Status: Final result Specimen: Blood from Arm, Left Updated: 02/29/24 0215     BNP 20 pg/mL     Procalcitonin [[id number]]  (Normal) Collected: 02/29/24 0007    Lab Status: Final result Specimen: Blood from Arm, Left Updated: 02/29/24 0138     Procalcitonin 0.11 ng/ml     CBC and differential [[id number]]  (Abnormal) Collected: 02/29/24 0107    Lab Status: Final result Specimen: Blood from Arm, Right Updated: 02/29/24 0126     WBC 4.07 Thousand/uL      RBC 3.91 Million/uL      Hemoglobin 9.4 g/dL      Hematocrit 32.3 %      MCV 83 fL      MCH 24.0 pg      MCHC 29.1 g/dL      RDW 17.9 %       MPV 9.5 fL      Platelets 303 Thousands/uL      nRBC 1 /100 WBCs      Neutrophils Relative 48 %      Immat GRANS % 2 %      Lymphocytes Relative 30 %      Monocytes Relative 16 %      Eosinophils Relative 3 %      Basophils Relative 1 %      Neutrophils Absolute 2.00 Thousands/µL      Immature Grans Absolute 0.06 Thousand/uL      Lymphocytes Absolute 1.20 Thousands/µL      Monocytes Absolute 0.63 Thousand/µL      Eosinophils Absolute 0.14 Thousand/µL      Basophils Absolute 0.04 Thousands/µL     Lactic acid [[id number]]  (Normal) Collected: 02/29/24 0007    Lab Status: Final result Specimen: Blood from Arm, Left Updated: 02/29/24 0045     LACTIC ACID 1.8 mmol/L     Narrative:      Result may be elevated if tourniquet was used during collection.    Comprehensive metabolic panel [[id number]]  (Abnormal) Collected: 02/29/24 0007    Lab Status: Final result Specimen: Blood from Arm, Left Updated: 02/29/24 0044     Sodium 139 mmol/L      Potassium 4.6 mmol/L      Chloride 99 mmol/L      CO2 32 mmol/L      ANION GAP 8 mmol/L      BUN 11 mg/dL      Creatinine 0.81 mg/dL      Glucose 109 mg/dL      Calcium 9.4 mg/dL      AST 53 U/L      ALT 43 U/L      Alkaline Phosphatase 44 U/L      Total Protein 7.9 g/dL      Albumin 4.1 g/dL      Total Bilirubin 0.54 mg/dL      eGFR 83 ml/min/1.73sq m     Narrative:      National Kidney Disease Foundation guidelines for Chronic Kidney Disease (CKD):     Stage 1 with normal or high GFR (GFR > 90 mL/min/1.73 square meters)    Stage 2 Mild CKD (GFR = 60-89 mL/min/1.73 square meters)    Stage 3A Moderate CKD (GFR = 45-59 mL/min/1.73 square meters)    Stage 3B Moderate CKD (GFR = 30-44 mL/min/1.73 square meters)    Stage 4 Severe CKD (GFR = 15-29 mL/min/1.73 square meters)    Stage 5 End Stage CKD (GFR <15 mL/min/1.73 square meters)  Note: GFR calculation is accurate only with a steady state creatinine    Protime-INR [[id number]]  (Normal) Collected: 02/29/24 0007    Lab Status: Final  result Specimen: Blood from Arm, Left Updated: 02/29/24 0042     Protime 12.6 seconds      INR 0.89    APTT [[id number]]  (Abnormal) Collected: 02/29/24 0007    Lab Status: Final result Specimen: Blood from Arm, Left Updated: 02/29/24 0042     PTT 21 seconds     HS Troponin 0hr (reflex protocol) [[id number]]  (Normal) Collected: 02/29/24 0007    Lab Status: Final result Specimen: Blood from Arm, Left Updated: 02/29/24 0041     hs TnI 0hr 6 ng/L     Blood culture #2 [[id number]] Collected: 02/29/24 0007    Lab Status: In process Specimen: Blood from Arm, Right Updated: 02/29/24 0015    Blood culture #1 [[id number]] Collected: 02/29/24 0007    Lab Status: In process Specimen: Blood from Arm, Left Updated: 02/29/24 0014    UA w Reflex to Microscopic w Reflex to Culture [[id number]]     Lab Status: No result Specimen: Urine                    XR chest 1 view portable   ED Interpretation by Leonardo Hunt PA-C (02/29 0450)   Poor inspiratory effort      CT chest abdomen pelvis w contrast    (Results Pending)              Procedures  Procedures         ED Course  ED Course as of 02/29/24 0647   Thu Feb 29, 2024   0216 BNP: 20   0311 Delta 2hr hsTnI: -3   0631 PREGNANCY, SERUM: Negative             HEART Risk Score      Flowsheet Row Most Recent Value   Heart Score Risk Calculator    History 0 Filed at: 02/29/2024 0625   ECG 1 Filed at: 02/29/2024 0625   Age 1 Filed at: 02/29/2024 0625   Risk Factors 1 Filed at: 02/29/2024 0625   Troponin 0 Filed at: 02/29/2024 0625   HEART Score 3 Filed at: 02/29/2024 0625                                        Medical Decision Making  The patient is a 53 y.o. female with a history of HIV, acute respiratory failure, who presents to [facility] with a chief complaint of hypoxia. Ddx infection vs chronic hypoxia vs cellulitis  Patient was recently admitted to the hospital for right lower leg cellulitis and discharged on 2/17/2024.  Patient was sent to [place] for  rehabilitation.  Patient was requiring 3 to 4 L of oxygen while admitted to the hospital.  She states that after a day at [facility] she wanted to go home.  However at home she did not have home oxygen.  She was having shortness of breath with daily activities and even at rest.  Right lower leg looks very similar to when she was in the hospital, erythematous, hot and swollen.  Patient requiring oxygen while in the ER. No WBC count, procal normal, lactic normal, trop and BNP unremarkable. Discussed case and the condition of the right lower leg with SLIM who believes admission for more IV abx is not warranted at this time. Previous admission note states that she may now have chronic erythema from venous stasis in the leg. Will obtain CT chest abdomen and pelvis due to SOB and painful abdominal adipose tissue. Patient has HIV which may chronically suppress WBC count and make her more susceptible to infection. Pending CT patient can go home after case management sets up home O2. Patient will continue to monitor right lower leg erythema and if it spreads past the lines she will return to the ER immediately. Signed out to Leann Yan PA-C pending CT scan and Case management consult.     Amount and/or Complexity of Data Reviewed  Labs: ordered. Decision-making details documented in ED Course.  Radiology: ordered and independent interpretation performed.    Risk  Prescription drug management.             Disposition  Final diagnoses:   None     ED Disposition       None          Follow-up Information    None         Patient's Medications   Discharge Prescriptions    No medications on file       No discharge procedures on file.    PDMP Review         Value Time User    PDMP Reviewed  Yes 2/17/2024 11:50 PM LUC Mukherjee            ED Provider  Electronically Signed by             Leonardo Hunt PA-C  02/29/24 2006

## 2024-02-29 NOTE — ED NOTES
Pt trialed off O2 for 5 min and spo2 dropped to 87% RA. Pt returned to 94% on 3L/     Cassidy Pastrana RN  02/29/24 1468

## 2024-02-29 NOTE — RESPIRATORY THERAPY NOTE
Home Oxygen Qualifying Test     Patient name: Robyn Lyons        : 1970   Date of Test:  2024  Diagnosis:    Home Oxygen Test:    **Medicare Guidelines require item(s) 1-5 on all ambulatory patients or 1 and 2 on non-ambulatory patients.    1. Baseline SPO2 on Room Air at rest 91 %   If <= 88% on Room Air add O2 via NC to obtain SpO2 >=88%. If LPM needed, document LPM  needed to reach =>88%    SPO2 during exertion on Room Air 90  %  During exertion monitor SPO2. If SPO2 increases >=89%, do not add supplemental oxygen    SPO2 on Oxygen at Rest 100 % at  3LPM    SPO2 during exertion on Oxygen  % at  LPM    Test performed during exertion activity.      []  Supplemental Home Oxygen is indicated.    [x]  Client does not qualify for home oxygen.    Respiratory Additional Notes- pt was on RA for 10 minutes in the chair and maintained a sat between 89-94.  When standing on RA put stayed about 90%.  Pt does not qualify for home O2    Mariely Cummings, RT

## 2024-02-29 NOTE — ED CARE HANDOFF
Emergency Department Sign Out Note        Sign out and transfer of care from Leonardo Hunt PA-C. See Separate Emergency Department note.     The patient, Robyn Lyons, was evaluated by the previous provider for SOB.    Workup Completed:  Labs    Results Reviewed       Procedure Component Value Units Date/Time    Blood culture #1 [[id number]] Collected: 02/29/24 0007    Lab Status: Preliminary result Specimen: Blood from Arm, Left Updated: 02/29/24 1001     Blood Culture Received in Microbiology Lab. Culture in Progress.    Blood culture #2 [[id number]] Collected: 02/29/24 0007    Lab Status: Preliminary result Specimen: Blood from Arm, Right Updated: 02/29/24 1001     Blood Culture Received in Microbiology Lab. Culture in Progress.    hCG, qualitative pregnancy [[id number]]  (Normal) Collected: 02/29/24 0007    Lab Status: Final result Specimen: Blood from Arm, Left Updated: 02/29/24 0631     Preg, Serum Negative    HS Troponin I 2hr [[id number]]  (Normal) Collected: 02/29/24 0241    Lab Status: Final result Specimen: Blood from Arm, Right Updated: 02/29/24 0309     hs TnI 2hr 3 ng/L      Delta 2hr hsTnI -3 ng/L     B-Type Natriuretic Peptide(BNP) [[id number]]  (Normal) Collected: 02/29/24 0007    Lab Status: Final result Specimen: Blood from Arm, Left Updated: 02/29/24 0215     BNP 20 pg/mL     Procalcitonin [[id number]]  (Normal) Collected: 02/29/24 0007    Lab Status: Final result Specimen: Blood from Arm, Left Updated: 02/29/24 0138     Procalcitonin 0.11 ng/ml     CBC and differential [[id number]]  (Abnormal) Collected: 02/29/24 0107    Lab Status: Final result Specimen: Blood from Arm, Right Updated: 02/29/24 0126     WBC 4.07 Thousand/uL      RBC 3.91 Million/uL      Hemoglobin 9.4 g/dL      Hematocrit 32.3 %      MCV 83 fL      MCH 24.0 pg      MCHC 29.1 g/dL      RDW 17.9 %      MPV 9.5 fL      Platelets 303 Thousands/uL      nRBC 1 /100 WBCs      Neutrophils Relative 48 %      Immat GRANS % 2 %       Lymphocytes Relative 30 %      Monocytes Relative 16 %      Eosinophils Relative 3 %      Basophils Relative 1 %      Neutrophils Absolute 2.00 Thousands/µL      Immature Grans Absolute 0.06 Thousand/uL      Lymphocytes Absolute 1.20 Thousands/µL      Monocytes Absolute 0.63 Thousand/µL      Eosinophils Absolute 0.14 Thousand/µL      Basophils Absolute 0.04 Thousands/µL     Lactic acid [[id number]]  (Normal) Collected: 02/29/24 0007    Lab Status: Final result Specimen: Blood from Arm, Left Updated: 02/29/24 0045     LACTIC ACID 1.8 mmol/L     Narrative:      Result may be elevated if tourniquet was used during collection.    Comprehensive metabolic panel [[id number]]  (Abnormal) Collected: 02/29/24 0007    Lab Status: Final result Specimen: Blood from Arm, Left Updated: 02/29/24 0044     Sodium 139 mmol/L      Potassium 4.6 mmol/L      Chloride 99 mmol/L      CO2 32 mmol/L      ANION GAP 8 mmol/L      BUN 11 mg/dL      Creatinine 0.81 mg/dL      Glucose 109 mg/dL      Calcium 9.4 mg/dL      AST 53 U/L      ALT 43 U/L      Alkaline Phosphatase 44 U/L      Total Protein 7.9 g/dL      Albumin 4.1 g/dL      Total Bilirubin 0.54 mg/dL      eGFR 83 ml/min/1.73sq m     Narrative:      National Kidney Disease Foundation guidelines for Chronic Kidney Disease (CKD):     Stage 1 with normal or high GFR (GFR > 90 mL/min/1.73 square meters)    Stage 2 Mild CKD (GFR = 60-89 mL/min/1.73 square meters)    Stage 3A Moderate CKD (GFR = 45-59 mL/min/1.73 square meters)    Stage 3B Moderate CKD (GFR = 30-44 mL/min/1.73 square meters)    Stage 4 Severe CKD (GFR = 15-29 mL/min/1.73 square meters)    Stage 5 End Stage CKD (GFR <15 mL/min/1.73 square meters)  Note: GFR calculation is accurate only with a steady state creatinine    Protime-INR [[id number]]  (Normal) Collected: 02/29/24 0007    Lab Status: Final result Specimen: Blood from Arm, Left Updated: 02/29/24 0042     Protime 12.6 seconds      INR 0.89    APTT [[id number]]  (Abnormal)  Collected: 02/29/24 0007    Lab Status: Final result Specimen: Blood from Arm, Left Updated: 02/29/24 0042     PTT 21 seconds     HS Troponin 0hr (reflex protocol) [[id number]]  (Normal) Collected: 02/29/24 0007    Lab Status: Final result Specimen: Blood from Arm, Left Updated: 02/29/24 0041     hs TnI 0hr 6 ng/L     UA w Reflex to Microscopic w Reflex to Culture [[id number]]     Lab Status: No result Specimen: Urine, Clean Catch               ED Course / Workup Pending (followup):  CT and case management consult      CT chest abdomen pelvis w contrast    Result Date: 2/29/2024  Impression: Study somewhat limited by respiratory motion artifact and patient's body habitus. No evidence of acute abnormality in the chest, abdomen or pelvis. Hepatomegaly with fatty infiltration. Workstation performed: [id number]         HEART Risk Score      Flowsheet Row Most Recent Value   Heart Score Risk Calculator    History 0 Filed at: 02/29/2024 0625   ECG 1 Filed at: 02/29/2024 0625   Age 1 Filed at: 02/29/2024 0625   Risk Factors 1 Filed at: 02/29/2024 0625   Troponin 0 Filed at: 02/29/2024 0625   HEART Score 3 Filed at: 02/29/2024 0625                      ED Course as of 02/29/24 1355   Thu Feb 29, 2024   1338 Respiratory completed home O2 evaluation and patient does not qualify. Case management able to set up home nursing and PT/OT. Previous provider discussed with internal medicine and they did not feel she required admission.          Procedures  Medical Decision Making  Assumed care of patient at shift change. See previous provider note for full details. In brief, this is a 53yoF who presented with SOB. Hx of morbid obesity. Recently admitted for a RLE wound/cellulitis and discharged 2 days ago to rehab. She was started on oxygen during her hospitalization and plan was to continue home oxygen. Left AMA from rehab yesterday and did not have home oxygen set up. Presented overnight for shortness of breath. Her leg wound was  noted to appear unchanged from her recent hospital visit.     Labs unremarkable including normal white count, lactate, procal. Previous provider  discussed case with internal medicine team. Hospitalist team did not recommend admission. CT CAP pending. Plan for case management consult in the morning.    CT unremarkable. Respiratory evaluated patient and she did not drop below 89% and therefore did not qualify for home oxygen. Case management able to set up home nursing and home PT/OT. Patient was unaware that she was supposed to continue antibiotics at home. Script for Keflex sent to pharmacy. Referral placed for pulmonology. Patient signed out to Dr. Crane awaiting transport home.         Amount and/or Complexity of Data Reviewed  Labs: ordered.  Radiology: ordered and independent interpretation performed.    Risk  Prescription drug management.            Disposition  Final diagnoses:   Shortness of breath   Wound of right lower extremity, initial encounter     Time reflects when diagnosis was documented in both MDM as applicable and the Disposition within this note       Time User Action Codes Description Comment    2/29/2024  1:36 PM Leann Yan Add [R06.02] Shortness of breath     2/29/2024  1:36 PM Leann Yan Add [S81.801A] Wound of right lower extremity, initial encounter           ED Disposition       ED Disposition   Discharge    Condition   Stable    Date/Time   Thu Feb 29, 2024 1336    Comment   Robyn Lyons discharge to home/self care.                   Follow-up Information       Follow up With Specialties Details Why Contact Info Additional Information    [facility]  Follow up  [facility] [address] [facility] [id number] [phone]     your family doctor  Schedule an appointment as soon as possible for a visit        [facility] Emergency Department Emergency Medicine  If symptoms worsen [address] [facility]  [place] [address]  [phone] [facility] Emergency Department, [address], [place], [address]          Patient's Medications   Discharge Prescriptions    CEPHALEXIN (KEFLEX) 500 MG CAPSULE    Take 1 capsule (500 mg total) by mouth every 6 (six) hours for 4 days       Start Date: 2/29/2024 End Date: 3/4/2024       Order Dose: 500 mg       Quantity: 16 capsule    Refills: 0     No discharge procedures on file.       ED Provider  Electronically Signed by     Leann Yan PA-C  02/29/24 8813

## 2024-02-29 NOTE — CASE MANAGEMENT
Case Management Discharge Planning Note    Patient name Robyn Lyons  Location ED 15/ED 15 MRN [id number]  : 1970 Date 2024       Current Admission Date: 2024  Current Admission Diagnosis:SOB (shortness of breath)   Patient Active Problem List    Diagnosis Date Noted    Fever 2024    Cellulitis of right lower extremity 2024    Class 3 severe obesity due to excess calories with serious comorbidity and body mass index (BMI) of 60.0 to 69.9 in adult (HCC) 2024    Anemia 2024    Acute respiratory failure with hypoxia (HCC) 2024    Asymptomatic HIV infection, with no history of HIV-related illness (HCC) 2024    Obesity hypoventilation syndrome (HCC) 2024    Ambulatory dysfunction 2024    Venous insufficiency 2022    Anxiety 2022    Gastroesophageal reflux disease 2022    Hypercholesterolemia 2022    Lower leg edema 2022    Vitamin D deficiency 2022    History of Aris-en-Y gastric bypass 2020      LOS (days): 0  Geometric Mean LOS (GMLOS) (days):   Days to GMLOS:     OBJECTIVE:            Current admission status: Emergency   Preferred Pharmacy:   [facility] - [address]  [address]  [address]  [place] [address]  Phone: [phone] Fax: [phone]    Primary Care Provider: No primary care provider on file.    Primary Insurance: [facility] [place] D[place] COM[place]  [place]P  Secondary Insurance: [facility]    DISCHARGE DETAILS:     Additional Comments: CM responded to CM consult for resources. Per chart review pt was discharged from [facility] and her home 02 was not delivered. CM called [facility] liaison ([phone]) who reports that pt left their facility AMA yesterday and that she will check with their team about the delivery of the home 02. CM department to follow.

## 2024-02-29 NOTE — CASE MANAGEMENT
Case Management Discharge Planning Note    Patient name Robyn Lyons  Location ED 15/ED 15 MRN [id number]  : 1970 Date 2024       Current Admission Date: 2024  Current Admission Diagnosis:SOB (shortness of breath)   Patient Active Problem List    Diagnosis Date Noted    Fever 2024    Cellulitis of right lower extremity 2024    Class 3 severe obesity due to excess calories with serious comorbidity and body mass index (BMI) of 60.0 to 69.9 in adult (HCC) 2024    Anemia 2024    Acute respiratory failure with hypoxia (HCC) 2024    Asymptomatic HIV infection, with no history of HIV-related illness (HCC) 2024    Obesity hypoventilation syndrome (HCC) 2024    Ambulatory dysfunction 2024    Venous insufficiency 2022    Anxiety 2022    Gastroesophageal reflux disease 2022    Hypercholesterolemia 2022    Lower leg edema 2022    Vitamin D deficiency 2022    History of Aris-en-Y gastric bypass 2020      LOS (days): 0  Geometric Mean LOS (GMLOS) (days):   Days to GMLOS:     OBJECTIVE:            Current admission status: Emergency   Preferred Pharmacy:   [facility] - [address]  [address]  [facility] [address]  [place] [address]  Phone: [phone] Fax: [phone]    Primary Care Provider: No primary care provider on file.    Primary Insurance: [facility] [place]  [place]P  Secondary Insurance: [facility]    DISCHARGE DETAILS:    Discharge planning discussed with:: Patient  Freedom of Choice: Yes  Comments - Freedom of Choice: Pt reports that she is not interested in looking for a different STR and would like to return home with VNA services.    Requested Home Health Care         Is the patient interested in HHC at discharge?: Yes  Home Health Discipline requested:: Nursing, Occupational Therapy, Physical Therapy  Home Health Agency Name:: Other  (Awaiting accepting)  Home Health Follow-Up Provider:: PCP  Home Health Services Needed:: Evaluate Functional Status and Safety, Gait/ADL Training, Oxygen Via Nasal Cannula, Strengthening/Theraputic Exercises to Improve Function  Homebound Criteria Met:: Uses an Assist Device (i.e. cane, walker, etc)  Supporting Clincal Findings:: Fatigues Easliy in Short Distances, Limited Endurance, Requires Oxygen    Additional Comments: CM spoke with pt at bedside who reports that she is not interested in looking for an alterate SNF, that she would like to return home with VNA services. Awaiting respiratory eval to complete home oxygen order. CM department to follow.

## 2024-02-29 NOTE — CASE MANAGEMENT
Case Management Discharge Planning Note    Patient name Robyn Lyons  Location ED 15/ED 15 MRN [id number]  : 1970 Date 2024       Current Admission Date: 2024  Current Admission Diagnosis:SOB (shortness of breath)   Patient Active Problem List    Diagnosis Date Noted    Fever 2024    Cellulitis of right lower extremity 2024    Class 3 severe obesity due to excess calories with serious comorbidity and body mass index (BMI) of 60.0 to 69.9 in adult (HCC) 2024    Anemia 2024    Acute respiratory failure with hypoxia (HCC) 2024    Asymptomatic HIV infection, with no history of HIV-related illness (HCC) 2024    Obesity hypoventilation syndrome (HCC) 2024    Ambulatory dysfunction 2024    Venous insufficiency 2022    Anxiety 2022    Gastroesophageal reflux disease 2022    Hypercholesterolemia 2022    Lower leg edema 2022    Vitamin D deficiency 2022    History of Aris-en-Y gastric bypass 2020      LOS (days): 0  Geometric Mean LOS (GMLOS) (days):   Days to GMLOS:     OBJECTIVE:            Current admission status: Emergency   Preferred Pharmacy:   [facility] - [address]  [address] [facility] [address]  [place] [address]  Phone: [phone] Fax: [phone]    Primary Care Provider: No primary care provider on file.    Primary Insurance: [facility] [place] D[place]TE  [place]P  Secondary Insurance: [facility]    DISCHARGE DETAILS:    Transport at Discharge : Wheelchair van  Dispatcher Contacted: Yes  Number/Name of Dispatcher: [employer]  Transported by (Company and Unit #): TBD     Additional Comments: Pt in need of WCV transportation home, WCV requested via [employer] and RN added to be notified of transportation confirmation.

## 2024-02-29 NOTE — CASE MANAGEMENT
Case Management Discharge Planning Note    Patient name Robyn Lyons  Location ED 15/ED 15 MRN [id number]  : 1970 Date 2024       Current Admission Date: 2024  Current Admission Diagnosis:SOB (shortness of breath)   Patient Active Problem List    Diagnosis Date Noted    Fever 2024    Cellulitis of right lower extremity 2024    Class 3 severe obesity due to excess calories with serious comorbidity and body mass index (BMI) of 60.0 to 69.9 in adult (HCC) 2024    Anemia 2024    Acute respiratory failure with hypoxia (HCC) 2024    Asymptomatic HIV infection, with no history of HIV-related illness (HCC) 2024    Obesity hypoventilation syndrome (HCC) 2024    Ambulatory dysfunction 2024    Venous insufficiency 2022    Anxiety 2022    Gastroesophageal reflux disease 2022    Hypercholesterolemia 2022    Lower leg edema 2022    Vitamin D deficiency 2022    History of Aris-en-Y gastric bypass 2020      LOS (days): 0  Geometric Mean LOS (GMLOS) (days):   Days to GMLOS:     OBJECTIVE:            Current admission status: Emergency   Preferred Pharmacy:   [facility] - [address]  [address]  [facility] [address]  [place] [address]  Phone: [phone] Fax: [phone]    Primary Care Provider: No primary care provider on file.    Primary Insurance: [facility] [place] D[place]TE  [place]P  Secondary Insurance: [facility]    DISCHARGE DETAILS:    DME Referral Provided  Referral made for DME?: Yes  DME referral completed for the following items:: Walker  DME Supplier Name:: [employer]    Other Referral/Resources/Interventions Provided:  Interventions: DME  Referral Comments: Pt requesting a RW but reports she did get one through insurance not too long ago. Per [employer] communication, pt is not eligible for another rolling walker through insurance and they are not  able to provide a private pay option. CM advised pt of alternative options for purchase other than [employer].

## 2024-02-29 NOTE — DISCHARGE INSTRUCTIONS
Take antibiotics as prescribed.     Please follow-up with your family doctor. Return to the ER with any worsening symptoms.

## 2024-02-29 NOTE — ED NOTES
Pt uncomfortable in ED stretcher. Up and OOB into lounge chair, assist x2     Cassidy Pastrana RN  02/29/24 2205

## 2024-03-01 LAB
ATRIAL RATE: 85 BPM
P AXIS: 47 DEGREES
PR INTERVAL: 178 MS
QRS AXIS: 67 DEGREES
QRSD INTERVAL: 90 MS
QT INTERVAL: 414 MS
QTC INTERVAL: 492 MS
T WAVE AXIS: 54 DEGREES
VENTRICULAR RATE: 85 BPM

## 2024-03-01 PROCEDURE — 93010 ELECTROCARDIOGRAM REPORT: CPT | Performed by: INTERNAL MEDICINE

## 2024-03-03 LAB
BACTERIA BLD CULT: NORMAL
BACTERIA BLD CULT: NORMAL

## 2024-03-05 LAB
BACTERIA BLD CULT: NORMAL
BACTERIA BLD CULT: NORMAL

## 2024-03-25 PROBLEM — K91.2 POSTSURGICAL MALABSORPTION: Status: ACTIVE | Noted: 2024-03-25

## 2024-03-25 PROBLEM — Z48.815 ENCOUNTER FOR SURGICAL AFTERCARE FOLLOWING SURGERY OF DIGESTIVE SYSTEM: Status: ACTIVE | Noted: 2024-03-25

## 2024-03-25 NOTE — ASSESSMENT & PLAN NOTE
-Avoid fried foods and trans fat, limit saturated fats and refined carbohydrates  -Increase fish/omega 3 FA consumption  -Increase physical activity  -obtain lipid panel

## 2024-03-25 NOTE — ASSESSMENT & PLAN NOTE
-At risk for malabsorption of vitamins/minerals secondary to malabsorption and restriction of intake from bariatric surgery  -NOT Currently taking adequate postop bariatric surgery vitamin supplementation: not taking any vitamins - gave handout and stressed importance of starting sarah MVI with iron and calcium citrate 500mg TID    -Obtain CBC/Metabolic panel  -Patient received education about the importance of adhering to a lifelong supplementation regimen to avoid vitamin/mineral deficiencies

## 2024-03-25 NOTE — ASSESSMENT & PLAN NOTE
-s/p Aris-En-Y Gastric Bypass in 2001 in [facility].     Patient is struggling with weight regain and ready to get back on track. She will enroll in Level 1 revision pathway - consult with surgical RD, BEATAW, and MWROME. Advised her that s/p open surgical cases there is no guarantee that we will be able to safely perform a revision for her. She verbalizes understanding.    She will work on diet and lifestyle changes - especially 30/60 rule, avoid mindless snacking, increase protein and fiber, avoid sugary drinks, avoid meal skipping, track/log, and exercise. Recommend UGI to r/o GGF and evaluate anatomy.    Initial: 420lbs  Current: 410.6lbs  [facility] E: 250lbs  Current BMI is Body mass index is 64.45 kg/m².    Tolerating a regular diet-yes  Eating at least 60 grams of protein per day-no  Following 30/60 minute rule with liquids-no  Drinking at least 64 ounces of fluid per day-no  Drinking carbonated beverages-no  Sufficient exercise-no disabled   Using NSAIDs regularly-no  Using nicotine-no  Using alcohol-no. Advised about the risks of alcohol s/p bariatric surgery and recommend avoiding all alcohol

## 2024-03-29 ENCOUNTER — OFFICE VISIT (OUTPATIENT)
Dept: BARIATRICS | Facility: CLINIC | Age: 54
End: 2024-03-29
Payer: COMMERCIAL

## 2024-03-29 VITALS
RESPIRATION RATE: 16 BRPM | DIASTOLIC BLOOD PRESSURE: 80 MMHG | SYSTOLIC BLOOD PRESSURE: 132 MMHG | HEIGHT: 67 IN | BODY MASS INDEX: 45.99 KG/M2 | WEIGHT: 293 LBS | HEART RATE: 90 BPM

## 2024-03-29 DIAGNOSIS — R63.5 WEIGHT GAIN FOLLOWING GASTRIC BYPASS SURGERY: ICD-10-CM

## 2024-03-29 DIAGNOSIS — Z21 ASYMPTOMATIC HIV INFECTION, WITH NO HISTORY OF HIV-RELATED ILLNESS (HCC): ICD-10-CM

## 2024-03-29 DIAGNOSIS — E78.00 HYPERCHOLESTEROLEMIA: ICD-10-CM

## 2024-03-29 DIAGNOSIS — K91.2 POSTSURGICAL MALABSORPTION: ICD-10-CM

## 2024-03-29 DIAGNOSIS — Z98.84 WEIGHT GAIN FOLLOWING GASTRIC BYPASS SURGERY: ICD-10-CM

## 2024-03-29 DIAGNOSIS — Z48.815 ENCOUNTER FOR SURGICAL AFTERCARE FOLLOWING SURGERY OF DIGESTIVE SYSTEM: Primary | ICD-10-CM

## 2024-03-29 DIAGNOSIS — E66.9 OBESITY: ICD-10-CM

## 2024-03-29 DIAGNOSIS — G47.33 OSA (OBSTRUCTIVE SLEEP APNEA): ICD-10-CM

## 2024-03-29 DIAGNOSIS — E66.01 MORBID OBESITY WITH BMI OF 60.0-69.9, ADULT (HCC): ICD-10-CM

## 2024-03-29 PROCEDURE — 99204 OFFICE O/P NEW MOD 45 MIN: CPT | Performed by: PHYSICIAN ASSISTANT

## 2024-03-29 RX ORDER — MORPHINE SULFATE 30 MG/1
30 CAPSULE, EXTENDED RELEASE ORAL 2 TIMES DAILY PRN
COMMUNITY

## 2024-03-29 RX ORDER — FLUTICASONE PROPIONATE 50 MCG
SPRAY, SUSPENSION (ML) NASAL
COMMUNITY
Start: 2024-02-07

## 2024-03-29 NOTE — PROGRESS NOTES
Assessment/Plan:    Encounter for surgical aftercare following surgery of digestive system  -s/p Aris-En-Y Gastric Bypass in 2001 in [facility].     Patient is struggling with weight regain and ready to get back on track. She will enroll in Level 1 revision pathway - consult with surgical RD, BEATAW, and MWM. Advised her that s/p open surgical cases there is no guarantee that we will be able to safely perform a revision for her. She verbalizes understanding.    She will work on diet and lifestyle changes - especially 30/60 rule, avoid mindless snacking, increase protein and fiber, avoid sugary drinks, avoid meal skipping, track/log, and exercise. Recommend UGI to r/o GGF and evaluate anatomy.    Initial: 420lbs  Current: 410.6lbs  Jose E: 250lbs  Current BMI is Body mass index is 64.45 kg/m².    Tolerating a regular diet-yes  Eating at least 60 grams of protein per day-no  Following 30/60 minute rule with liquids-no  Drinking at least 64 ounces of fluid per day-no  Drinking carbonated beverages-no  Sufficient exercise-no disabled   Using NSAIDs regularly-no  Using nicotine-no  Using alcohol-no. Advised about the risks of alcohol s/p bariatric surgery and recommend avoiding all alcohol    Postsurgical malabsorption  -At risk for malabsorption of vitamins/minerals secondary to malabsorption and restriction of intake from bariatric surgery  -NOT Currently taking adequate postop bariatric surgery vitamin supplementation: not taking any vitamins - gave handout and stressed importance of starting sarah MVI with iron and calcium citrate 500mg TID    -Obtain CBC/Metabolic panel  -Patient received education about the importance of adhering to a lifelong supplementation regimen to avoid vitamin/mineral deficiencies       Asymptomatic HIV infection, with no history of HIV-related illness (HCC)  -on symtuza    Hypercholesterolemia  -Avoid fried foods and trans fat, limit saturated fats and refined carbohydrates  -Increase fish/omega 3 FA  consumption  -Increase physical activity  -obtain lipid panel    ANA MARIA (obstructive sleep apnea)  -Hx of CPAP, no longer has one d/t moving and lost it  -Sleep medicine consult      Diagnoses and all orders for this visit:    Encounter for surgical aftercare following surgery of digestive system    Postsurgical malabsorption  -     CBC and differential; Future  -     Comprehensive metabolic panel; Future  -     Folate; Future  -     Hemoglobin A1C; Future  -     Iron Panel (Includes Ferritin, Iron Sat%, Iron, and TIBC); Future  -     Lipid panel; Future  -     PTH, intact; Future  -     Zinc; Future  -     Vitamin D 25 hydroxy; Future  -     Vitamin B12; Future  -     Vitamin B1, whole blood; Future  -     Vitamin A; Future    Asymptomatic HIV infection, with no history of HIV-related illness (HCC)    Hypercholesterolemia  -     Lipid panel; Future    Weight gain following gastric bypass surgery  -     FL UPPER GI UGI; Future    Obesity    Morbid obesity with BMI of 60.0-69.9, adult (HCC)  -     Hemoglobin A1C; Future  -     Lipid panel; Future    ANA MARIA (obstructive sleep apnea)  -     Ambulatory referral to Sleep Medicine; Future    Other orders  -     fluticasone (FLONASE) 50 mcg/act nasal spray  -     morphine (NITIN) 30 MG 24 hr capsule; Take 30 mg by mouth 2 (two) times a day as needed          Subjective:      Patient ID: Robyn Lyons is a 53 y.o. female.    -s/[facility]  Presents to the office today to establish care and for concerns of weight regain.  Tolerating diet without issues; denies N/V, dysphagia, reflux.     The patient lost about 170lbs s/p surgery and then she was maintaining her weight around 250lbs and then got pregnant 10 months after her surgery. Has since had 5 kids. Gained at most 30lbs during pregnancies but has since been steadily regaining until recent 20lbs weight loss d/t liquid diet. She is now almost regained all of her weight loss. Has been on  "ozempic in the past - did not lose weight. Was being worked up for revision to DS surgery in [facility] but then moved here about 2 years ago. Recent cut on leg caused sepsis and had multiple surgeries and skin grafts.     No NSAIDS, quit smoking in December - vows she will never restart. Takes morphine and gabapentin for chronic joint pains. On disability. Wishes to be around for her children.    Has 5 kids 2 sons - youngest is 6; 1 daughter.    Diet Recall:   B - pureed soup or smart ones breakfast meal  L - smart ones lunch   D - chicken leg or some vegetables or frozen or chinese food    Fluids - 32-48oz water, 8oz coffee w/ almond milk w/ equal or 1% milk    The following portions of the patient's history were reviewed and updated as appropriate: allergies, current medications, past family history, past medical history, past social history, past surgical history and problem list.    Review of Systems   Constitutional:  Positive for unexpected weight change (weight regain). Negative for chills and fever.   HENT:  Negative for trouble swallowing.    Respiratory:  Negative for cough and shortness of breath.    Cardiovascular:  Negative for chest pain and palpitations.   Gastrointestinal:  Negative for abdominal pain, constipation, diarrhea, nausea and vomiting.   Neurological:  Negative for dizziness.   Psychiatric/Behavioral:          Denies anxiety and depression         Objective:      /80 (BP Location: Left arm, Patient Position: Sitting, Cuff Size: Large)   Pulse 90   Resp 16   Ht 5' 6.93\" (1.7 m)   Wt (!) 186 kg (410 lb 9.6 oz)   BMI 64.45 kg/m²     Colonoscopy-Completed       Physical Exam  Vitals reviewed.   Constitutional:       General: She is not in acute distress.     Appearance: She is well-developed.   HENT:      Head: Normocephalic and atraumatic.   Eyes:      General: No scleral icterus.  Cardiovascular:      Rate and Rhythm: Normal rate and regular rhythm.   Pulmonary:      Effort: Pulmonary " effort is normal. No respiratory distress.   Abdominal:      General: There is no distension.      Palpations: Abdomen is soft.      Comments: Large well healed midline incision with large reducible ventral/incisional hernia midline   Musculoskeletal:      Right lower leg: Edema (wearing compression stocking; dusky skin and healing wound intact) present.      Comments: Using wheelchair   Skin:     General: Skin is warm and dry.   Neurological:      Mental Status: She is alert.   Psychiatric:         Mood and Affect: Mood normal.         Behavior: Behavior normal.           BARRIERS: none identified    GOALS:   Continued/Maintain healthy weight loss with good nutrition intakes.  Adequate hydration with at least 64oz. fluid intake.  Normal vitamin and mineral levels.  Exercise as tolerated.    Follow-up in 6 months. We kindly ask that your arrive 15 minutes before your scheduled appointment time with your provider to allow our staff to room you, get your vital signs and update your chart.    Follow diet as discussed.      Get lab work done in the next 2 weeks.  You have been given a lab slip today.  Please call the office if you need a replacement.  It is recommended to check with your insurance BEFORE getting labs done to make sure they are covered by your policy.  Also, please check with your PCP and other providers before getting labs to avoid duplicate labs. Make sure to HOLD any multivitamins that may contain biotin and any biotin supplements FOR 5 DAYS before any labs since it can affect the results.    Follow vitamin and mineral recommendations as reviewed with you.    Call our office if you have any problems with abdominal pain especially associated with fever, chills, nausea, vomiting or any other concerns.    All  Post-bariatric surgery patients should be aware that very small quantities of any alcohol can cause impairment and it is very possible not to feel the effect. The effect can be in the system for  several hours.  It is also a stomach irritant.     It is advised to AVOID alcohol, Nonsteroidal antiinflammatory drugs (NSAIDS) and nicotine of all forms . Any of these can cause stomach irritation/pain.

## 2024-03-29 NOTE — PATIENT INSTRUCTIONS
GOALS:   Continued/Maintain healthy weight loss with good nutrition intakes.  Adequate hydration with at least 64oz. fluid intake.  Normal vitamin and mineral levels.  Exercise as tolerated.    Follow-up in 6 months. We kindly ask that your arrive 15 minutes before your scheduled appointment time with your provider to allow our staff to room you, get your vital signs and update your chart.    Follow diet as discussed.      Get lab work done in the next 2 weeks.  You have been given a lab slip today.  Please call the office if you need a replacement.  It is recommended to check with your insurance BEFORE getting labs done to make sure they are covered by your policy.  Also, please check with your PCP and other providers before getting labs to avoid duplicate labs. Make sure to HOLD any multivitamins that may contain biotin and any biotin supplements FOR 5 DAYS before any labs since it can affect the results.    Follow vitamin and mineral recommendations as reviewed with you.    Call our office if you have any problems with abdominal pain especially associated with fever, chills, nausea, vomiting or any other concerns.    All  Post-bariatric surgery patients should be aware that very small quantities of any alcohol can cause impairment and it is very possible not to feel the effect. The effect can be in the system for several hours.  It is also a stomach irritant.     It is advised to AVOID alcohol, Nonsteroidal antiinflammatory drugs (NSAIDS) and nicotine of all forms . Any of these can cause stomach irritation/pain.

## 2024-04-09 ENCOUNTER — TELEPHONE (OUTPATIENT)
Dept: NEUROLOGY | Facility: CLINIC | Age: 54
End: 2024-04-09

## 2024-04-11 ENCOUNTER — TELEPHONE (OUTPATIENT)
Dept: SLEEP CENTER | Facility: CLINIC | Age: 54
End: 2024-04-11

## 2024-04-11 NOTE — TELEPHONE ENCOUNTER
4/11 LM FOR PT TO CALL - NEED NAME OF PCP TO REQ PHYS ORDER. ONE ON FILE IS NEUROLOGY & ORDER MUST COME FROM PCP

## 2024-04-25 PROBLEM — R50.9 FEVER: Status: RESOLVED | Noted: 2024-02-25 | Resolved: 2024-04-25

## 2024-08-30 NOTE — RESPIRATORY THERAPY NOTE
02/19/24 2228   Respiratory Assessment   Resp Comments Pt refusing to wear cpap at this time   Non-Invasive Information   SpO2 95 %     RT Ventilator Management Note  Robyn Lyons 53 y.o. female MRN: [id number]  Unit/Bed#: -01 Encounter: [id number]      Daily Screen    No data found in the last 10 encounters.           Physical Exam:          Resp Comments: Pt refusing to wear cpap at this time     Discharge orders acknowledged by RN . Discharge teaching completed with pt about follow ups and CHF education. AVS reviewed and all questions answered. Medication regimen reviewed and pt understands schedule. IV removed. Bedside monitor removed from pt. Pt discharged with all belongings to home with spouse. Pt transported off of unit via wheelchair. Patient sent with diet instructions and verbalized understanding. List of home care agencies provided per family request. Son had questions about pace maker surgery 9/3, instructed to call primary cardiologist in AM to verify if team still would like to based on recent admission. Son verbalized understanding. No complications.     Electronically signed by Deepali Faustin RN on 8/30/2024 at 5:36 PM

## 2024-09-23 ENCOUNTER — APPOINTMENT (OUTPATIENT)
Age: 54
End: 2024-09-23
Payer: COMMERCIAL

## 2024-09-23 DIAGNOSIS — R60.0 LOCALIZED EDEMA: ICD-10-CM

## 2024-09-23 DIAGNOSIS — M25.512 LEFT SHOULDER PAIN, UNSPECIFIED CHRONICITY: ICD-10-CM

## 2024-09-23 DIAGNOSIS — M25.511 RIGHT SHOULDER PAIN, UNSPECIFIED CHRONICITY: ICD-10-CM

## 2024-09-23 DIAGNOSIS — L03.115 CELLULITIS OF RIGHT FOOT: ICD-10-CM

## 2024-09-23 PROCEDURE — 73600 X-RAY EXAM OF ANKLE: CPT

## 2024-09-23 PROCEDURE — 73590 X-RAY EXAM OF LOWER LEG: CPT

## 2024-09-23 PROCEDURE — 73030 X-RAY EXAM OF SHOULDER: CPT

## 2024-10-08 NOTE — ASSESSMENT & PLAN NOTE
Continue home [facility] [phone]   Health Maintenance Topic(s) Outreach Outcomes & Actions Taken:    Eye Exam - Outreach Outcomes & Actions Taken  : Diabetic Eye External Records Uploaded, Care Team & History Updated if Applicable       Additional Notes:  Diabetic Eye Exam 10/8/24

## 2025-01-09 NOTE — ASSESSMENT & PLAN NOTE
Electromyography Laboratory Report       Accreditation with Exemplary Status     Impression:    This is an abnormal study.     1. There is electrophysiologic evidence consistent with a moderate-to-severe, left median neuropathy across the wrist with active denervation. These electrophysiologic findings are consistent with a clinical diagnosis of carpal tunnel syndrome.     2. There is electrophysiologic evidence consistent with a left ulnar neuropathy. There is evidence of focal slowing across the elbow; however, there is also evidence of a conduction block in the forearm segment.  A neuromuscular ultrasound can be considered for further localization given these findings.    There is no electrophysiologic evidence of cervical radiculopathy in the left upper extremity.    The complete report can be found under the procedure tab.    Reviewed and approved by HOMERO HANNA on 1/9/25 at 3:03 PM.   Significant improvement since initiation of antibiotics  Does have baseline erythema from chronic venous stasis dermatitis  But overall warmth and tenderness has resolved  Transition antibiotics to PO on 2/23  Patient now agreeable to rehab

## 2025-03-18 ENCOUNTER — TELEPHONE (OUTPATIENT)
Age: 55
End: 2025-03-18

## 2025-03-18 NOTE — TELEPHONE ENCOUNTER
Patient call to reschedule appointment she missed first available 4/29/2025 at 10:30 am. Patient would like something sooner please call patient at [phone]

## 2025-04-09 ENCOUNTER — TELEPHONE (OUTPATIENT)
Age: 55
End: 2025-04-09

## 2025-04-09 NOTE — TELEPHONE ENCOUNTER
Patient called in regarding notifications she receives for sooner appointments. Advised patient unfortunately the appointments may already end up taken before she responds. Patient would also like to know if the office has wheelchairs available for her upcoming appointment. Please advise.

## 2025-04-16 ENCOUNTER — TELEPHONE (OUTPATIENT)
Age: 55
End: 2025-04-16

## 2025-04-16 NOTE — TELEPHONE ENCOUNTER
Patient was returning call regarding her scheduled appt on Friday with Claudette.  Ptsaid she did see Claudette last year because she did have surgery along time ago elsewhere.  Please call her back so that she can get scheduled accordingly.

## 2025-04-16 NOTE — ASSESSMENT & PLAN NOTE
-s/p Aris-En-Y Gastric Bypass in 2001 in [facility].     Patient is struggling with weight regain and ready to get back on track. She will enroll in Level 1 revision pathway - consult with surgical RD, BEATAW, and DAVIS. Advised her that s/p open surgical cases there is no guarantee that we will be able to safely perform a revision for her. She verbalizes understanding.    She will work on diet and lifestyle changes - especially 30/60 rule, avoid mindless snacking, increase protein and fiber, avoid sugary drinks, avoid meal skipping, track/log, and exercise. Recommend UGI to r/o GGF and evaluate anatomy.    Will also trial Zepbound. Patient denies personal history of pancreatitis. Patient also denies personal and family history of medullary thyroid cancer and multiple endocrine neoplasia type 2 (MEN 2 tumor).     Initial: 420lbs  Current: 411.4lbs  Jose E: 250lbs  Current BMI is Body mass index is 63.86 kg/m².    Tolerating a regular diet-yes  Eating at least 60 grams of protein per day-no  Following 30/60 minute rule with liquids-no  Drinking at least 64 ounces of fluid per day-no  Drinking carbonated beverages-no  Sufficient exercise-no disabled and wheelchair bound  Using NSAIDs regularly-no  Using nicotine-no  Using alcohol-no. Advised about the risks of alcohol s/p bariatric surgery and recommend avoiding all alcohol

## 2025-04-16 NOTE — ASSESSMENT & PLAN NOTE
-At risk for malabsorption of vitamins/minerals secondary to malabsorption and restriction of intake from bariatric surgery  -NOT Currently taking adequate postop bariatric surgery vitamin supplementation: Vitron C - not taking any other vitamins - gave handout and stressed importance of starting sarah MVI with iron and calcium citrate 500mg TID    -Noted labs from last month - Vitamin D deficiency (15) and iron deficiency (ferritin 10) - iron infusions and Vitamin D repletion Rx - repeat labs in 3 [facility]    -Obtain CBC/Metabolic panel  -Patient received education about the importance of adhering to a lifelong supplementation regimen to avoid vitamin/mineral deficiencies

## 2025-04-18 ENCOUNTER — OFFICE VISIT (OUTPATIENT)
Dept: BARIATRICS | Facility: CLINIC | Age: 55
End: 2025-04-18
Payer: COMMERCIAL

## 2025-04-18 VITALS
HEART RATE: 101 BPM | WEIGHT: 293 LBS | HEIGHT: 67 IN | BODY MASS INDEX: 45.99 KG/M2 | DIASTOLIC BLOOD PRESSURE: 74 MMHG | SYSTOLIC BLOOD PRESSURE: 128 MMHG

## 2025-04-18 DIAGNOSIS — E66.01 MORBID OBESITY WITH BMI OF 60.0-69.9, ADULT (HCC): ICD-10-CM

## 2025-04-18 DIAGNOSIS — G47.33 OSA (OBSTRUCTIVE SLEEP APNEA): ICD-10-CM

## 2025-04-18 DIAGNOSIS — K91.2 POSTSURGICAL MALABSORPTION: ICD-10-CM

## 2025-04-18 DIAGNOSIS — E78.00 HYPERCHOLESTEROLEMIA: ICD-10-CM

## 2025-04-18 DIAGNOSIS — Z48.815 ENCOUNTER FOR SURGICAL AFTERCARE FOLLOWING SURGERY OF DIGESTIVE SYSTEM: Primary | ICD-10-CM

## 2025-04-18 DIAGNOSIS — D64.9 ANEMIA, UNSPECIFIED TYPE: ICD-10-CM

## 2025-04-18 DIAGNOSIS — Z98.84 WEIGHT GAIN FOLLOWING GASTRIC BYPASS SURGERY: ICD-10-CM

## 2025-04-18 DIAGNOSIS — R63.5 WEIGHT GAIN FOLLOWING GASTRIC BYPASS SURGERY: ICD-10-CM

## 2025-04-18 PROCEDURE — 99214 OFFICE O/P EST MOD 30 MIN: CPT | Performed by: PHYSICIAN ASSISTANT

## 2025-04-18 RX ORDER — SODIUM CHLORIDE 9 MG/ML
20 INJECTION, SOLUTION INTRAVENOUS ONCE
OUTPATIENT
Start: 2025-04-25

## 2025-04-18 RX ORDER — TIRZEPATIDE 2.5 MG/.5ML
2.5 INJECTION, SOLUTION SUBCUTANEOUS WEEKLY
Qty: 2 ML | Refills: 0 | Status: SHIPPED | OUTPATIENT
Start: 2025-04-18 | End: 2025-05-16

## 2025-04-18 NOTE — PROGRESS NOTES
Assessment/Plan:    Encounter for surgical aftercare following surgery of digestive system  -s/p Aris-En-Y Gastric Bypass in 2001 in [facility].     Patient is struggling with weight regain and ready to get back on track. She will enroll in Level 1 revision pathway - consult with surgical RD, LCSW, and MWM. Advised her that s/p open surgical cases there is no guarantee that we will be able to safely perform a revision for her. She verbalizes understanding.    She will work on diet and lifestyle changes - especially 30/60 rule, avoid mindless snacking, increase protein and fiber, avoid sugary drinks, avoid meal skipping, track/log, and exercise. Recommend UGI to r/o GGF and evaluate anatomy.    Will also trial Zepbound. Patient denies personal history of pancreatitis. Patient also denies personal and family history of medullary thyroid cancer and multiple endocrine neoplasia type 2 (MEN 2 tumor).     Initial: 420lbs  Current: 411.4lbs  Jose E: 250lbs  Current BMI is Body mass index is 63.86 kg/m².    Tolerating a regular diet-yes  Eating at least 60 grams of protein per day-no  Following 30/60 minute rule with liquids-no  Drinking at least 64 ounces of fluid per day-no  Drinking carbonated beverages-no  Sufficient exercise-no disabled and wheelchair bound  Using NSAIDs regularly-no  Using nicotine-no  Using alcohol-no. Advised about the risks of alcohol s/p bariatric surgery and recommend avoiding all alcohol    ANA MARIA (obstructive sleep apnea)  -Hx of CPAP, no longer has one d/t moving and lost it  -Sleep medicine consult     Postsurgical malabsorption  -At risk for malabsorption of vitamins/minerals secondary to malabsorption and restriction of intake from bariatric surgery  -NOT Currently taking adequate postop bariatric surgery vitamin supplementation: Vitron C - not taking any other vitamins - gave handout and stressed importance of starting sarah MVI with iron and calcium citrate 500mg TID    -Noted labs from last month -  Vitamin D deficiency (15) and iron deficiency (ferritin 10) - iron infusions and Vitamin D repletion Rx - repeat labs in 3 [facility]    -Obtain CBC/Metabolic panel  -Patient received education about the importance of adhering to a lifelong supplementation regimen to avoid vitamin/mineral deficiencies       Hypercholesterolemia  -Avoid fried foods and trans fat, limit saturated fats and refined carbohydrates  -Increase fish/omega 3 FA consumption  -Increase physical activity  -obtain lipid panel     Diagnoses and all orders for this visit:    Encounter for surgical aftercare following surgery of digestive system    ANA MARIA (obstructive sleep apnea)    Postsurgical malabsorption  -     sodium chloride 0.9 % infusion  -     iron sucrose (VENOFER) 300 mg in sodium chloride 0.9 % 250 mL IVPB    Hypercholesterolemia    Morbid obesity with BMI of 60.0-69.9, adult (HCC)  -     tirzepatide (Zepbound) 2.5 mg/0.5 mL auto-injector; Inject 0.5 mL (2.5 mg total) under the skin once a week for 28 days    Anemia, unspecified type  -     sodium chloride 0.9 % infusion  -     iron sucrose (VENOFER) 300 mg in sodium chloride 0.9 % 250 mL IVPB    Weight gain following gastric bypass surgery  -     FL UPPER GI UGI; Future    Other orders  -     Cancel: iron sucrose (VENOFER) 200 mg in sodium chloride 0.9 % IVPB          Subjective:      Patient ID: Robyn Lyons is a 54 y.o. female.    -s/p OPEN Aris-En-Y Gastric Bypass in 2001 in [facility].  Presents to the office today for routine follow up. Tolerating diet without issues; denies N/V, dysphagia, reflux. Overall doing Poorly - has not been able to lose any weight but stable over the last year.    She consulted with me 1 year ago - enrolled in Level 1 revision pathway but did not follow up d/t life stressors. She is now ready to pursue revision pathway and lose weight and get back on track.    Has been on ozempic in the past - did not lose weight.     Still not smoking - quit last year. No ETOH,  "no NSAIDS. Takes morphine and gabapentin for chronic joint pains. On disability. Wishes to be around for her children.     Has 5 kids 2 sons - youngest is 6; 1 daughter.     Diet Recall:   B - 2 toast and avocado and eggs  L - Green salad and fish  D - fish or chicken and veggies with small pasta or lasagne  HS - SF candy    Fluids - 16oz water, 12oz coffee, sometimes crystal lite    The following portions of the patient's history were reviewed and updated as appropriate: allergies, current medications, past family history, past medical history, past social history, past surgical history and problem list.    Review of Systems   Constitutional:  Positive for unexpected weight change (weight regain). Negative for chills and fever.   HENT:  Negative for trouble swallowing.    Respiratory:  Negative for cough and shortness of breath.    Cardiovascular:  Positive for leg swelling. Negative for chest pain and palpitations.   Gastrointestinal:  Negative for abdominal pain, constipation, diarrhea, nausea and vomiting.   Musculoskeletal:  Positive for arthralgias and back pain.   Neurological:  Negative for dizziness.   Psychiatric/Behavioral:          Denies anxiety and depression         Objective:      /74 (Patient Position: Sitting, Cuff Size: Large)   Pulse 101   Ht 5' 7.3\" (1.709 m)   Wt (!) 187 kg (411 lb 6.4 oz)   BMI 63.86 kg/m²     Colonoscopy-Needs to complete Cologuard        Physical Exam  Vitals reviewed.   Constitutional:       General: She is not in acute distress.     Appearance: She is well-developed. She is obese.   HENT:      Head: Normocephalic and atraumatic.   Eyes:      General: No scleral icterus.  Cardiovascular:      Rate and Rhythm: Normal rate and regular rhythm.   Pulmonary:      Effort: Pulmonary effort is normal. No respiratory distress.   Abdominal:      General: There is no distension.      Palpations: Abdomen is soft.      Tenderness: There is no abdominal tenderness.      Hernia: " A hernia (incisional hernia - easily reducible) is present.      Comments: Well healed open midline incision   Musculoskeletal:      Right lower leg: Edema (wearing compression stocking) present.      Comments: Wheelchair bound   Skin:     General: Skin is warm and dry.   Neurological:      Mental Status: She is alert.   Psychiatric:         Mood and Affect: Mood normal.         Behavior: Behavior normal.           BARRIERS: none identified    GOALS:   Continued/Maintain healthy weight loss with good nutrition intakes.  Adequate hydration with at least 64oz. fluid intake.  Normal vitamin and mineral levels.  Exercise as tolerated.    Follow-up in 6 months. We kindly ask that your arrive 15 minutes before your scheduled appointment time with your provider to allow our staff to room you, get your vital signs and update your chart.    Follow diet as discussed.      Get lab work done in the next 2 weeks.  You have been given a lab slip today.  Please call the office if you need a replacement.  It is recommended to check with your insurance BEFORE getting labs done to make sure they are covered by your policy.  Also, please check with your PCP and other providers before getting labs to avoid duplicate labs. Make sure to HOLD any multivitamins that may contain biotin and any biotin supplements FOR 5 DAYS before any labs since it can affect the results.    Follow vitamin and mineral recommendations as reviewed with you.    Call our office if you have any problems with abdominal pain especially associated with fever, chills, nausea, vomiting or any other concerns.    All  Post-bariatric surgery patients should be aware that very small quantities of any alcohol can cause impairment and it is very possible not to feel the effect. The effect can be in the system for several hours.  It is also a stomach irritant.     It is advised to AVOID alcohol, Nonsteroidal antiinflammatory drugs (NSAIDS) and nicotine of all forms . Any of  these can cause stomach irritation/pain.

## 2025-04-22 ENCOUNTER — TELEPHONE (OUTPATIENT)
Dept: SURGERY | Facility: CLINIC | Age: 55
End: 2025-04-22

## 2025-04-22 NOTE — TELEPHONE ENCOUNTER
Reason for call:   [x] Prior Auth  [] Other:     Caller:  [x] Patient  [] Pharmacy  Name:   Address:   Callback Number:     Medication: Zepbound    Dose/Frequency: 2.5 mg weekly    Quantity: 2 ml    Ordering Provider:   [] PCP/Provider -   [x] Speciality/Provider - Claudette Sierra PA-C    Has the patient tried other medications and failed? If failed, which medications did they fail?    [] No   [x] Yes - Ozempic    Is the patient's insurance updated in EPIC?   [x] Yes   [] No     Is a copy of the patient's insurance scanned in EPIC?   [x] Yes   [] No

## 2025-04-24 NOTE — TELEPHONE ENCOUNTER
Patient called in to say that she thinks the prior auth was sent to the wrong insurance, that it should go to [facility]

## 2025-04-28 ENCOUNTER — TELEPHONE (OUTPATIENT)
Dept: BARIATRICS | Facility: CLINIC | Age: 55
End: 2025-04-28

## 2025-04-28 NOTE — TELEPHONE ENCOUNTER
LVM informing pt Marlen the dietician that was doing 2nd part of eval will be out but we have Frances covering virtually for that portion of Eval. Patient is still to come to the office for her 10 am appointment with Misa for 1st part of Eval.

## 2025-04-29 ENCOUNTER — TELEMEDICINE (OUTPATIENT)
Dept: BARIATRICS | Facility: CLINIC | Age: 55
End: 2025-04-29

## 2025-04-29 ENCOUNTER — TELEPHONE (OUTPATIENT)
Age: 55
End: 2025-04-29

## 2025-04-29 ENCOUNTER — CLINICAL SUPPORT (OUTPATIENT)
Dept: BARIATRICS | Facility: CLINIC | Age: 55
End: 2025-04-29

## 2025-04-29 DIAGNOSIS — K91.2 POSTSURGICAL MALABSORPTION: Primary | ICD-10-CM

## 2025-04-29 DIAGNOSIS — Z98.84 BARIATRIC SURGERY STATUS: Primary | ICD-10-CM

## 2025-04-29 DIAGNOSIS — F41.9 ANXIETY AND DEPRESSION: ICD-10-CM

## 2025-04-29 DIAGNOSIS — F32.A ANXIETY AND DEPRESSION: ICD-10-CM

## 2025-04-29 PROCEDURE — RECHECK

## 2025-04-29 NOTE — TELEPHONE ENCOUNTER
Pt called about 10:00 appointment with Misa. Office is going to call her back to let her know if it can be virtual or needs to be rescheduled. Reminded her of her 11:00 virtual appt with Frances.
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
## 2025-04-29 NOTE — PROGRESS NOTES
"Bariatric Nutrition Assessment Note--VIRTUAL     Patient's name and  were verified during visit. Pt present in a state that I hold active licensure.   Provider explained that [employer] is a HIPPA compliant platform and to further protect their confidentiality the provider was alone and the office door was closed. Patient consented to the virtual video visit Patient consented to the [employer] visit.  This visit is free.     Type of surgery    Gastric bypass: open  Surgery Date:   24 years  post-op  Surgeon: Karel Surgeons: [facility]      Nutrition Assessment   Robyn Eloy  54 y.o.  female     Wt with BMI of 25: 159.3  Pre-Op Excess Wt: 252    Sx wt in :  420#  Jose E:  255# w/in 10 mins, then got pregnant with 1st child, now has 5 kids--gained with each pregnancie and gained a lot more during covid, then moved to [place] (3 yres ago) and then got infection in leg which decreased activity the most.   Weight:  411 on  in office with PA-c  BMI:  63.8    [address] Bills Equation:    [id number]  Weight Maintenance 3000  Estimated calories for weight loss 0617-1694 ( 1-2# per wk wt loss - sedentary )  Estimated protein needs 72-108g(1.0-1.5 gms/kg IBW )   Fluids:  160  oz total ( Meseret-Segar method )   Fluid Requirement Calculator  Free Fluid  128  oz free fluid ([place]-Segar method; -20%)    Weight History   Onset of Obesity: Childhood, was always \"chunky\" then really increased around 22yrs old  Family history of obesity: Yes  Wt Loss Attempts: Commercial Programs (Weight Watchers, Gimmie, etc.)  Counseling with  MD  Exercise  Self Created Diets (Portion Control, Healthy Food Choices, etc.)  Patient has tried the above for 6 months or more with insufficient weight loss or weight regain, which is why patient has requested to be evaluated for weight loss surgery today  Maximum Wt Lost: some, but not much.     Review of History and Medications   Past Medical History:   Diagnosis Date    " HIV (human immunodeficiency virus infection) (HCC)     Leg wound, right     Obesity     Sleep apnea      No past surgical history on file.  Social History     Socioeconomic History    Marital status: Single     Spouse name: Not on file    Number of children: Not on file    Years of education: Not on file    Highest education level: Not on file   Occupational History    Not on file   Tobacco Use    Smoking status: Former     Current packs/day: 0.50     Average packs/day: 0.5 packs/day for 1.3 years (0.7 ttl pk-yrs)     Types: Cigarettes     Start date: 12/29/2023    Smokeless tobacco: Never   Vaping Use    Vaping status: Never Used   Substance and Sexual Activity    Alcohol use: Never    Drug use: Never    Sexual activity: Not on file   Other Topics Concern    Not on file   Social History Narrative    Not on file     Social Drivers of Health     Financial Resource Strain: Not on file   Food Insecurity: No Food Insecurity (2/19/2024)    Nursing - Inadequate Food Risk Classification     Worried About Running Out of Food in the Last Year: Never true     Ran Out of Food in the Last Year: Never true     Ran Out of Food in the Last Year: Not on file   Recent Concern: Food Insecurity - Food Insecurity Present (2/17/2024)    Hunger Vital Sign     Worried About Running Out of Food in the Last Year: Sometimes true     Ran Out of Food in the Last Year: Never true   Transportation Needs: No Transportation Needs (2/19/2024)    PRAPARE - Transportation     Lack of Transportation (Medical): No     Lack of Transportation (Non-Medical): No   Recent Concern: Transportation Needs - Unmet Transportation Needs (2/17/2024)    PRAPARE - Transportation     Lack of Transportation (Medical): Yes     Lack of Transportation (Non-Medical): Yes   Physical Activity: Not on file   Stress: Not on file   Social Connections: Unknown (6/18/2024)    Received from [facility]    Social Connections     How often do you feel lonely or isolated from those  around you? (Adult - for ages 18 years and over): Not on file   Intimate Partner Violence: Not on file   Housing Stability: High Risk (2/19/2024)    Housing Stability Vital Sign     Unable to Pay for Housing in the Last Year: Yes     Number of Times Moved in the Last Year: 1     Homeless in the Last Year: No       Current Outpatient Medications:     baclofen 20 mg tablet, Take 20 mg by mouth Three times a day (Patient not taking: Reported on 4/18/2025), Disp: , Rfl:     Voovo-Okqee-Kapdivjg-TenofAF (Symtuza) [phone] MG TABS, Take 1 tablet by mouth daily, Disp: , Rfl:     fluticasone (FLONASE) 50 mcg/act nasal spray, , Disp: , Rfl:     gabapentin (NEURONTIN) 100 mg capsule, Take 1 capsule (100 mg total) by mouth 3 (three) times a day (Patient not taking: Reported on 4/18/2025), Disp: , Rfl:     morphine (NITIN) 30 MG 24 hr capsule, Take 30 mg by mouth 2 (two) times a day as needed, Disp: , Rfl:     tirzepatide (Zepbound) 2.5 mg/0.5 mL auto-injector, Inject 0.5 mL (2.5 mg total) under the skin once a week for 28 days, Disp: 2 mL, Rfl: 0    Food Intake and Lifestyle Assessment   Food Intake Assessment completed via usual diet recall  Took first Zepbound injection on Sunday  Wake:  7:15am  Home attendant comes  Breakfast: 9am-  2soft boiled eggs, 2 toast,1 avocado  Snack: -   Lunch: 12p-chicken soup with some white rice on side + salad OR cauliflower airfried w/tomato and cheese OR fish/salmon w/salad w/veggies, feta, olives, balsamic or mixed veggies   Snack: -  Dinner: 5p-similar to dinner--fish/salmon + cauliflower rice OR last night had smart ones dinner  Snack: Steven (1 pack 110cals)  Reports no snacking    Beverage intake: water, sugar free beverages, and coffee/tea  Protein supplement: smoothie with coconut water + fruits (doesn't add protein)  Estimated protein intake per day: <60gm  Estimated fluid intake per day: 32oz water per day, occ coffee  Meals eaten away from home: on home aid  not around to  "cook--chinese (shrimp + sauce) OR pizza (Saturday)  Typical meal pattern: 3 meals per day and reports no snacks snacks per day  Eating Behaviors: Consumption of high calorie/ high fat foods, Large portion sizes, Frequent snacking/ grazing, Mindless eating, Emotional eating, Craves sweet foods, and Craves salty foods  Food allergies or intolerances: No Known Allergies  Cultural or Zoroastrian considerations: -    Vitamins:  None at this time, just completed the Vit rx    Physical Assessment  Physical Activity  Types of exercise: None  Current physical limitations:  due to weight, neuropathy     Psychosocial Assessment   Support systems: children has home adie  Socioeconomic factors: disability    Nutrition Diagnosis  Diagnosis: Overweight / Obesity (NC-3.3)  Related to: Physical inactivity and Excessive energy intake  As Evidenced by: BMI >25     Nutrition Prescription: Recommend the following diet  Regular    Interventions and Teaching   Discussed pre-op and post-op nutrition guidelines.       Patient educated and handouts provided.  Surgical changes to stomach / GI  Capacity of post-surgery stomach  Diet progression  Adequate hydration  Sugar and fat restriction to decrease \"dumping syndrome\"  Fat restriction to decrease steatorrhea  Expected weight loss  Weight loss plateaus/ possibility of weight regain  Exercise  Suggestions for pre-op diet  Nutrition considerations after surgery  Protein supplements  Meal planning and preparation  Appropriate carbohydrate, protein, and fat intake, and food/fluid choices to maximize safe weight loss, nutrient intake, and tolerance   Dietary and lifestyle changes  Possible problems with poor eating habits  Intuitive eating  Techniques for self monitoring and keeping daily food journal  Potential for food intolerance after surgery, and ways to deal with them including: lactose intolerance, nausea, reflux, vomiting, diarrhea, food intolerance, appetite changes, gas  Vitamin / Mineral " supplementation of Multivitamin with minerals, Calcium, Vitamin B12, Iron, and Vitamin D    Patient is not currently pregnant and doesn't desire to become pregnant a minimum of one year post-op    Education provided to: patient    Barriers to learning: No barriers identified    Readiness to change: preparation    Prior research on procedure: books, internet, discussed with provider, and friends or family    Comprehension: verbalizes understanding     Expected Compliance: fair    Recommendations  Pt is an appropriate candidate for surgery. Not at this time. Pt is level 1 revision and needs to show compliance with vitamins with increase in current vitamin levels and adhering to bariatric lifestyle     Evaluation / Monitoring  Dietitian to Monitor: Eating pattern as discussed Tolerance of nutrition prescription Body weight Lab values Physical activity    Pre-op weight goals:  Do NOT Gain  Encouraged weight loss w/ diet and lifestyle changes  Will be started on a 2 week liver shrinking diet, possibly shorter/longer as per the discretion of the surgeon/team, directly prior to surgery    Pt presents today as a level 1 virtual eval. Pt had lost significant weight within 10 months s/p RYGB in 2001 but then got pregnant 10 months after surgery and since has started to regain. She gained about 30# with each one of her 5 pregnancies and gained more during COVID.  Pt currently is on disability, lives in a home with 4 of her 5 children and has a home health aid that comes in 6 days per week. The home health aid cooks and helps her around the house.      Pt was started on Zepbound by surgical PA and will be followed up by MW provider to manage. Pt had first injection on Sunday. She had some nausea but overall tolerated well. Reviewed diet recall and pt reports only 3 meals per day and very rarely snacking. Discussed ensuring she is getting proper protein due to her h/o of RYGB and now on Zepbound.     Pt reports she is  interested in the DS. Advised pt the the DS results in more malabsorption than the RYGB therefore post-op vitamins are vital. She is not currently taking any vitamins other than the Vit D rx her PCP prescribed that she just finished.  Reviewed proper post-op vitamins to include a bariatric multi and calcium at minimum. Pt was concerned about the cost of the vitamins. Advised that there is a brand that is $99 for a one year supply, but after DS there may be other vitamins she may need to add to her regimen and purchase. Pt appeared concerned about the cost.  Sent pt a Mems-ID message with recommendations.     Pt scheduled with MWM provider on 5/7. Pt questioning if that was virtual or in the office.  Appears the visit is in the office. Encouraged pt to keep the visit as an in office visit so we can assess weight changes on the medication. Scheduled pt to meet with RD on 6/6.  Can decide if she prefer that in person or virtual closer to appt date.     Goals  Eliminate sugar sweetened beverages--continue non-calorie beverages  Food journal via baritastic  Exercise 30 minutes 5 times per week--reach out to PT   Complete lesson plans 1-6  Eat 3 meals per day with protein at each meal  Try some protein shakes and have as a mid-afternoon snack  Eliminate mindless snacking  MWM appt on 5/7  RD appt on 6/6    Time Spent:   1 Hour

## 2025-05-13 ENCOUNTER — TELEPHONE (OUTPATIENT)
Age: 55
End: 2025-05-13

## 2025-05-13 NOTE — TELEPHONE ENCOUNTER
Pt calling in asking if she can take flonase and amoxicillin at the same time as zepbound. Confirmed.

## 2025-05-19 DIAGNOSIS — E66.01 MORBID OBESITY WITH BMI OF 60.0-69.9, ADULT (HCC): ICD-10-CM

## 2025-05-19 NOTE — TELEPHONE ENCOUNTER
Patient is requesting Zepbound be sent to [facility] [phone]     How are you tolerating the medication?   [x] Nausea -slight  [] Vomiting  [] Diarrhea  [x] Asymptomatic  [] Other:    Last visit weight:411    Current weight: 394    Date of last injection:5/18/25    How many injections do you have left:0

## 2025-05-21 DIAGNOSIS — E66.01 MORBID OBESITY WITH BMI OF 60.0-69.9, ADULT (HCC): Primary | ICD-10-CM

## 2025-05-21 RX ORDER — TIRZEPATIDE 5 MG/.5ML
5 INJECTION, SOLUTION SUBCUTANEOUS WEEKLY
Qty: 2 ML | Refills: 0 | Status: SHIPPED | OUTPATIENT
Start: 2025-05-21 | End: 2025-06-18

## 2025-05-21 RX ORDER — TIRZEPATIDE 2.5 MG/.5ML
2.5 INJECTION, SOLUTION SUBCUTANEOUS WEEKLY
Qty: 2 ML | Refills: 0 | OUTPATIENT
Start: 2025-05-21 | End: 2025-06-18

## 2025-05-21 NOTE — TELEPHONE ENCOUNTER
Patient currently on 2.5MG dose, no side effects.  Current weight 394 lbs.  She would like to go to 5MG Zepbound.  Please send to [email or web address].

## 2025-05-24 ENCOUNTER — TELEPHONE (OUTPATIENT)
Dept: OTHER | Facility: OTHER | Age: 55
End: 2025-05-24

## 2025-05-27 ENCOUNTER — TELEPHONE (OUTPATIENT)
Dept: BARIATRICS | Facility: CLINIC | Age: 55
End: 2025-05-27

## 2025-05-28 NOTE — TELEPHONE ENCOUNTER
[facility] Approved 5/27/25-11/27/25.  Pharmacy was notified and they're processing prescription for the patient.  Patient notified via [employer].

## 2025-05-30 ENCOUNTER — TELEPHONE (OUTPATIENT)
Age: 55
End: 2025-05-30

## 2025-05-30 NOTE — TELEPHONE ENCOUNTER
REASON FOR CONVERSATION: Medication Problem    SYMPTOMS:   Name of Medication: “What is the medication you are calling about?” Zepbound    Dosage: “What dose are you currently taking” 5mg    Question: “What is your question?” (Medication refill, side effect, reaction) pen malfunction    Prescribing HCP: “Who prescribed it?” VIRGINIE Sierra PA-C    Date of last injection: 5/18/25    How many injections do you have left: 3     OTHER HEALTH INFORMATION: PT injected pen and did not hear 2nd click. PT looked at pen and did not see needle, liquid medication on skin or in pen, or injection site hole on skin. PT unsure whether or not she received injection.    PROTOCOL DISPOSITION: No disposition on file.    CARE ADVICE PROVIDED: Advised PT to not take another dose if she feels like that she got even a little bit of medication; but make take dose if she is absolutely sure that she did not get any medication. Discussed process of getting replacement pen.     Discussed with PT that she would have to restart titration if last injection >14 days ago. PT verbalized understanding.     PT will wait to see if she starts experiencing side effects of medication before taking another dose. PT will call back with any new or worsening sxs.    PRACTICE FOLLOW-UP: n/a

## 2025-06-02 ENCOUNTER — TELEPHONE (OUTPATIENT)
Age: 55
End: 2025-06-02

## 2025-06-02 ENCOUNTER — OFFICE VISIT (OUTPATIENT)
Dept: BARIATRICS | Facility: CLINIC | Age: 55
End: 2025-06-02
Payer: COMMERCIAL

## 2025-06-02 VITALS
OXYGEN SATURATION: 96 % | BODY MASS INDEX: 45.99 KG/M2 | DIASTOLIC BLOOD PRESSURE: 62 MMHG | HEART RATE: 81 BPM | WEIGHT: 293 LBS | HEIGHT: 67 IN | SYSTOLIC BLOOD PRESSURE: 160 MMHG

## 2025-06-02 DIAGNOSIS — E66.01 MORBID OBESITY WITH BMI OF 60.0-69.9, ADULT (HCC): Primary | ICD-10-CM

## 2025-06-02 DIAGNOSIS — K59.00 CONSTIPATION: ICD-10-CM

## 2025-06-02 PROBLEM — E66.813 CLASS 3 SEVERE OBESITY DUE TO EXCESS CALORIES WITH SERIOUS COMORBIDITY AND BODY MASS INDEX (BMI) OF 60.0 TO 69.9 IN ADULT: Status: RESOLVED | Noted: 2024-02-17 | Resolved: 2025-06-02

## 2025-06-02 PROCEDURE — 99214 OFFICE O/P EST MOD 30 MIN: CPT

## 2025-06-02 RX ORDER — POLYETHYLENE GLYCOL 3350 17 G/17G
17 POWDER, FOR SOLUTION ORAL DAILY PRN
Qty: 17 G | Refills: 2 | Status: SHIPPED | OUTPATIENT
Start: 2025-06-02

## 2025-06-02 NOTE — ASSESSMENT & PLAN NOTE
- Patient is pursuing Conservative Program and follow up visits with medical weight management provider  - Initial weight loss goal of 5-10% weight loss for improved health. Weight loss can improve patient's co-morbid conditions and/or prevent weight-related complications.  - Explained the importance of continuing lifestyle changes in addition to any anti-obesity medications.   - Labs reviewed from 2/2025    General Recommendations:  Nutrition:  Eat breakfast daily.  Do not skip meals.      Food log (ie.) [email or web address], sparkpeople.com, loseit.com, calorieking.com, etc.     Practice mindful eating.  Be sure to set aside time to eat, eat slowly, and savor your food.     Hydration:    At least 64oz of water daily.  No sugar sweetened beverages.  No juice (eat the fruit instead).     Exercise:  Studies have shown that the ideal exercise goal is somewhere between 150 to 300 minutes of moderate intensity exercise a week.  Start with exercising 10 minutes every other day and gradually increase physical activity with a goal of at least 150 minutes of moderate intensity exercise a week, divided over at least 3 days a week.  An example of this would be exercising 30 minutes a day, 5 days a week.  Resistance training can increase muscle mass and increase our resting metabolic rate.   FULL BODY resistance training is recommended 2-3 times a week.  Do not do this on consecutive days to allow for muscle recovery.     Aim for a bare minimum 5000 steps, even on days you do not exercise.     Monitoring:   Weigh yourself daily.  If this causes undue stress, then just weigh yourself once a week.  Weigh yourself the same time of the day with the same amount of clothing on.  Preferably this should be done after waking up, before you eat, and with no clothing or minimal clothing on.     Specific Goals:  - Discussed her lifestyle and nutrition and strongly encourage that she try to eat at least 3 meals a day or smaller more  frequent meals throughout the day while focusing on her caloric intake and her protein intake.   -Discussed increasing her physical activity with chair exercises as she is able.  -Discussed increasing her water intake and adding mirilax PRN to help with her constipation, along with increasing her fiber in her diet.   -Discussed her zepbound and she just started 5mg, so she will remain there for now as she has seen some good weight loss so far.   Patient denies personal history of pancreatitis. Patient also denies personal and family history of medullary thyroid cancer and multiple endocrine neoplasia type 2 (MEN 2 tumor).

## 2025-06-02 NOTE — TELEPHONE ENCOUNTER
PT called in asking if there is a sooner appointment today. Advised PT that there is no sooner appointment available. PT verbalized understanding.

## 2025-06-02 NOTE — PROGRESS NOTES
Assessment/Plan:     Morbid obesity with BMI of 60.0-69.9, adult (HCC)  - Patient is pursuing Conservative Program and follow up visits with medical weight management provider  - Initial weight loss goal of 5-10% weight loss for improved health. Weight loss can improve patient's co-morbid conditions and/or prevent weight-related complications.  - Explained the importance of continuing lifestyle changes in addition to any anti-obesity medications.   - Labs reviewed from 2/2025    General Recommendations:  Nutrition:  Eat breakfast daily.  Do not skip meals.      Food log (ie.) www.AnSyn.com, sparkpeople.com, loseit.com, calorieking.com, etc.     Practice mindful eating.  Be sure to set aside time to eat, eat slowly, and savor your food.     Hydration:    At least 64oz of water daily.  No sugar sweetened beverages.  No juice (eat the fruit instead).     Exercise:  Studies have shown that the ideal exercise goal is somewhere between 150 to 300 minutes of moderate intensity exercise a week.  Start with exercising 10 minutes every other day and gradually increase physical activity with a goal of at least 150 minutes of moderate intensity exercise a week, divided over at least 3 days a week.  An example of this would be exercising 30 minutes a day, 5 days a week.  Resistance training can increase muscle mass and increase our resting metabolic rate.   FULL BODY resistance training is recommended 2-3 times a week.  Do not do this on consecutive days to allow for muscle recovery.     Aim for a bare minimum 5000 steps, even on days you do not exercise.     Monitoring:   Weigh yourself daily.  If this causes undue stress, then just weigh yourself once a week.  Weigh yourself the same time of the day with the same amount of clothing on.  Preferably this should be done after waking up, before you eat, and with no clothing or minimal clothing on.     Specific Goals:  - Discussed her lifestyle and nutrition and strongly  encourage that she try to eat at least 3 meals a day or smaller more frequent meals throughout the day while focusing on her caloric intake and her protein intake.   -Discussed increasing her physical activity with chair exercises as she is able.  -Discussed increasing her water intake and adding mirilax PRN to help with her constipation, along with increasing her fiber in her diet.   -Discussed her zepbound and she just started 5mg, so she will remain there for now as she has seen some good weight loss so far.   Patient denies personal history of pancreatitis. Patient also denies personal and family history of medullary thyroid cancer and multiple endocrine neoplasia type 2 (MEN 2 tumor).           Robyn was seen today for follow-up.    Diagnoses and all orders for this visit:    Morbid obesity with BMI of 60.0-69.9, adult (HCC)    Constipation  -     polyethylene glycol (MIRALAX) 17 g packet; Take 17 g by mouth daily as needed (Daily as needed for constipation)        Total time spent reviewing chart, interviewing patient, examining patient, discussing plan, answering all questions, and documentin minutes with >50% face-to-face time with the patient.    Follow up in approximately 3 months with Non-Surgical Physician/Advanced Practitioner.    Subjective:   Chief Complaint   Patient presents with    Follow-up     Follow up zepbound 5mg       Patient ID: Robyn Lyons  is a 54 y.o. female with excess weight/obesity here to pursue weight management.  Patient is pursuing Conservative Program.   Most recent notes and records were reviewed.    HPI    Wt Readings from Last 20 Encounters:   25 (!) 176 kg (388 lb)   25 (!) 187 kg (411 lb 6.4 oz)   24 (!) 186 kg (410 lb 9.6 oz)   24 (!) 200 kg (440 lb 14.7 oz)       Patient presents today to medical weight management office for follow up.  s/p OPEN Aris-En-Y Gastric Bypass in  in [facility].  Presents to the office today for routine follow up.  Tolerating diet without issues; denies N/V, dysphagia, reflux. Overall doing Poorly - has not been able to lose any weight but stable over the last year.     She consulted with me 1 year ago - enrolled in Level 1 revision pathway but did not follow up d/t life stressors. She is now ready to pursue revision pathway and lose weight and get back on track.     Has been on ozempic in the past - did not lose weight.      Still not smoking - quit last year. No ETOH, no NSAIDS. Takes morphine and gabapentin for chronic joint pains. On disability. Wishes to be around for her children.     She recently lost her help at home but her son tries when he can to help her. So she is not always in charge of her food choices. She is on zepbound, just started 5 mg recently and is so far doing well with no side effects.     Weight loss medication and dose: zepbound 5 mg   Started weight and date: 420 lbs 4/18/2025  Current weight: 388 lbs   Difference: -32 lbs   CHUCKY: 250 lbs    Starting BMI: 63  Current BMI:         Diet Recall:   B - 2 toast and avocado and eggs  L - Green salad and fish  D - fish or chicken and veggies with small pasta or lasagne  HS - SF candy     Fluids - 16oz water, 12oz coffee, sometimes crystal lite      The following portions of the patient's history were reviewed and updated as appropriate: allergies, current medications, past family history, past medical history, past social history, past surgical history, and problem list.    Family History[1]     Review of Systems   Constitutional:  Negative for fatigue.   HENT:  Negative for sore throat.    Respiratory:  Negative for cough and shortness of breath.    Cardiovascular:  Negative for chest pain, palpitations and leg swelling.   Gastrointestinal:  Negative for abdominal pain, constipation, diarrhea, nausea and vomiting.   Genitourinary:  Negative for dysuria.   Musculoskeletal:  Negative for arthralgias and back pain.   Skin:  Negative for rash.  "  Neurological:  Negative for headaches.   Psychiatric/Behavioral:  Negative for dysphoric mood. The patient is not nervous/anxious.        Objective:  /62 (BP Location: Left arm, Patient Position: Sitting, Cuff Size: Large)   Pulse 81   Ht 5' 7\" (1.702 m)   Wt (!) 176 kg (388 lb)   SpO2 96%   BMI 60.77 kg/m²     Physical Exam  Vitals and nursing note reviewed.   Constitutional:       Appearance: Normal appearance. She is obese.   HENT:      Head: Normocephalic.   Pulmonary:      Effort: Pulmonary effort is normal.     Neurological:      Mental Status: She is oriented to person, place, and time.     Psychiatric:         Mood and Affect: Mood normal.         Behavior: Behavior normal.         Thought Content: Thought content normal.         Judgment: Judgment normal.            Labs   Most recent labs reviewed   Lab Results   Component Value Date    SODIUM 141 02/04/2025    K 4.8 02/04/2025     02/04/2025    CO2 31 02/04/2025    AGAP 10 02/04/2025    BUN 12 02/04/2025    CREATININE 0.72 02/04/2025    GLUC 91 02/04/2025    CALCIUM 9.6 02/04/2025    AST 41 (H) 02/04/2025    ALT 41 02/04/2025    ALKPHOS 61 02/04/2025    TP 7.4 02/04/2025    TBILI 0.6 02/04/2025    EGFR 99 02/04/2025     Lab Results   Component Value Date    HGBA1C 5.6 12/19/2022     Lab Results   Component Value Date    TSH 2.69 02/04/2025     No results found for: \"CHOLESTEROL\"  No results found for: \"HDL\"  No results found for: \"TRIG\"  No results found for: \"LDLCALC\"         [1]   Family History  Problem Relation Name Age of Onset    Cancer Mother       "

## 2025-06-10 ENCOUNTER — TELEPHONE (OUTPATIENT)
Age: 55
End: 2025-06-10

## 2025-06-10 NOTE — TELEPHONE ENCOUNTER
Zepbound 5 mg refill request.    How are you tolerating the medication?   [] Nausea  [] Vomiting  [] Diarrhea  [x] Asymptomatic  [] Other:    Last visit weight: 388    Current weight: 388 (Pt haven't check)    Date of last injection: 06/05/25    How many injections do you have left: 2    [facility] - FLOR Rolon

## 2025-06-11 NOTE — PROGRESS NOTES
"Bariatric Nutrition Assessment - Follow-up Note       Type of surgery    Gastric bypass: open  Surgery Date: 2001  24 years  post-op  Surgeon: Karel Surgeons: [facility]     Nutrition Assessment   Robyn Lyons  54 y.o.  female   Height: 5'7\"   Current Weight: 386#  Wt with BMI of 25: 159.3  Pre-Op Excess Wt: 252    Sx wt in 2001:  420#  Jose E:  255# w/in 10 mins, then got pregnant with 1st child, now has 5 kids--gained with each pregnanciy and gained a lot more during covid, then moved to [place] (3 yrs ago) and then got infection in leg which decreased activity  Weight:  411 on 4/18 in office with PA-c  BMI:  63.8    [facility] Equation:    QMC=6459  Weight Maintenance 3000  Estimated calories for weight loss 6394-1924 ( 1-2# per wk wt loss - sedentary )  Estimated protein needs 72-108g(1.0-1.5 gms/kg IBW )   Fluids:  160  oz total ( Meseret-Segar method )   Fluid Requirement Calculator  Free Fluid  128  oz free fluid (Stokes-Segar method; -20%)    Weight History   Onset of Obesity: Childhood, was always \"chunky\" then really increased around 22yrs old  Family history of obesity: Yes  Wt Loss Attempts: Commercial Programs (Weight Watchers, [employer], etc.)  Counseling with  MD  Exercise  Self Created Diets (Portion Control, Healthy Food Choices, etc.)  Patient has tried the above for 6 months or more with insufficient weight loss or weight regain, which is why patient has requested to be evaluated for weight loss surgery today  Maximum Wt Lost: some, but not much.     Review of History and Medications   Past Medical History:   Diagnosis Date    HIV (human immunodeficiency virus infection) (HCC)     Leg wound, right     Obesity     Sleep apnea      No past surgical history on file.  Social History     Socioeconomic History    Marital status: Single   Tobacco Use    Smoking status: Former     Current packs/day: 0.50     Average packs/day: 0.5 packs/day for 1.4 years (0.7 ttl pk-yrs)     Types: " Cigarettes     Start date: 12/29/2023    Smokeless tobacco: Never   Vaping Use    Vaping status: Never Used   Substance and Sexual Activity    Alcohol use: Never    Drug use: Never     Social Drivers of Health     Food Insecurity: No Food Insecurity (2/19/2024)    Nursing - Inadequate Food Risk Classification     Worried About Running Out of Food in the Last Year: Never true     Ran Out of Food in the Last Year: Never true   Recent Concern: Food Insecurity - Food Insecurity Present (2/17/2024)    Hunger Vital Sign     Worried About Running Out of Food in the Last Year: Sometimes true     Ran Out of Food in the Last Year: Never true   Transportation Needs: No Transportation Needs (2/19/2024)    PRAPARE - Transportation     Lack of Transportation (Medical): No     Lack of Transportation (Non-Medical): No   Recent Concern: Transportation Needs - Unmet Transportation Needs (2/17/2024)    PRAPARE - Transportation     Lack of Transportation (Medical): Yes     Lack of Transportation (Non-Medical): Yes    Received from [employer]   Housing Stability: High Risk (2/19/2024)    Housing Stability Vital Sign     Unable to Pay for Housing in the Last Year: Yes     Number of Times Moved in the Last Year: 1     Homeless in the Last Year: No       Current Outpatient Medications:     baclofen 20 mg tablet, Take 20 mg by mouth in the morning and 20 mg at noon and 20 mg in the evening., Disp: , Rfl:     Dzghx-Mpjnj-Oidraiwc-TenofAF (Symtuza) [phone] MG TABS, Take 1 tablet by mouth in the morning., Disp: , Rfl:     fluticasone (FLONASE) 50 mcg/act nasal spray, , Disp: , Rfl:     gabapentin (NEURONTIN) 100 mg capsule, Take 1 capsule (100 mg total) by mouth 3 (three) times a day (Patient not taking: Reported on 6/2/2025), Disp: , Rfl:     morphine (NITIN) 30 MG 24 hr capsule, Take 30 mg by mouth as needed in the morning and 30 mg as needed in the evening., Disp: , Rfl:     polyethylene glycol (MIRALAX) 17 g  packet, Take 17 g by mouth daily as needed (Daily as needed for constipation), Disp: 17 g, Rfl: 2    tirzepatide (Zepbound) 5 mg/0.5 mL auto-injector, Inject 0.5 mL (5 mg total) under the skin once a week for 28 days, Disp: 2 mL, Rfl: 0    Food Intake and Lifestyle Assessment   Food Intake Assessment completed via usual diet recall  Took first Zepbound injection on Sunday  Wake:  7:15am  Home attendant comes  Breakfast: 9am-  2soft boiled eggs, 2 toast,1 avocado  Snack: -   Lunch: 12p-chicken soup with some white rice on side + salad OR cauliflower airfried w/tomato and cheese OR fish/salmon w/salad w/veggies, feta, olives, balsamic or mixed veggies   Snack: -  Dinner: 5p-similar to dinner--fish/salmon + cauliflower rice OR last night had smart ones dinner  Snack: Steven (1 pack 110cals)  Reports no snacking    Beverage intake: water, sugar free beverages, and coffee/tea  Protein supplement: smoothie with coconut water + fruits (doesn't add protein)  Estimated protein intake per day: <60gm  Estimated fluid intake per day: 32oz water per day, occ coffee  Meals eaten away from home: on home aid  not around to cook--chinese (shrimp + sauce) OR pizza (Saturday)  Typical meal pattern: 3 meals per day and reports no snacks snacks per day  Eating Behaviors: Consumption of high calorie/ high fat foods, Large portion sizes, Frequent snacking/ grazing, Mindless eating, Emotional eating, Craves sweet foods, and Craves salty foods  Food allergies or intolerances: No Known Allergies  Cultural or Hindu considerations: -    Vitamins:  None at this time, just completed the Vit rx    Physical Assessment  Physical Activity  Types of exercise: None  Current physical limitations:  due to weight, neuropathy     Psychosocial Assessment   Support systems: children has home adie  Socioeconomic factors: disability    Nutrition Diagnosis  Diagnosis: Overweight / Obesity (NC-3.3)  Related to: Physical inactivity and Excessive energy  "intake  As Evidenced by: BMI >25     Nutrition Prescription: Recommend the following diet  Regular    Interventions and Teaching   Discussed pre-op and post-op nutrition guidelines.       Patient educated and handouts provided.  Surgical changes to stomach / GI  Capacity of post-surgery stomach  Diet progression  Adequate hydration  Sugar and fat restriction to decrease \"dumping syndrome\"  Fat restriction to decrease steatorrhea  Expected weight loss  Weight loss plateaus/ possibility of weight regain  Exercise  Suggestions for pre-op diet  Nutrition considerations after surgery  Protein supplements  Meal planning and preparation  Appropriate carbohydrate, protein, and fat intake, and food/fluid choices to maximize safe weight loss, nutrient intake, and tolerance   Dietary and lifestyle changes  Possible problems with poor eating habits  Intuitive eating  Techniques for self monitoring and keeping daily food journal  Potential for food intolerance after surgery, and ways to deal with them including: lactose intolerance, nausea, reflux, vomiting, diarrhea, food intolerance, appetite changes, gas  Vitamin / Mineral supplementation of Multivitamin with minerals, Calcium, Vitamin B12, Iron, and Vitamin D    Patient is not currently pregnant and doesn't desire to become pregnant a minimum of one year post-op    Education provided to: patient    Barriers to learning: No barriers identified    Readiness to change: preparation    Prior research on procedure: books, internet, discussed with provider, and friends or family    Comprehension: verbalizes understanding     Expected Compliance: fair    Recommendations  Pt is an appropriate candidate for surgery. Not at this time. Pt is level 1 revision and needs to show compliance with vitamins with increase in current vitamin levels and adhering to bariatric lifestyle     Evaluation / Monitoring  Dietitian to Monitor: Eating pattern as discussed Tolerance of nutrition " prescription Body weight Lab values Physical activity    Pre-op weight goals:  Do NOT Gain  Encouraged weight loss w/ diet and lifestyle changes  Will be started on a 2 week liver shrinking diet, possibly shorter/longer as per the discretion of the surgeon/team, directly prior to surgery    Evaluation Summary Note - was Virtual visit  Pt had lost significant weight within 10 months s/p RYGB in 2001 but then got pregnant 10 months after surgery and since has started to regain. She gained about 30# with each one of her 5 pregnancies and gained more during COVID.  Pt currently is on disability, lives in a home with 4 of her 5 children and has a home health aid that comes in 6 days per week. The home health aid cooks and helps her around the house.      Pt was started on Zepbound by surgical PA and will be followed up by [facility] provider to manage. Pt had first injection on Sunday. She had some nausea but overall tolerated well. Reviewed diet recall and pt reports only 3 meals per day and very rarely snacking. Discussed ensuring she is getting proper protein due to her h/o of RYGB and now on Zepbound.     Pt reports she is interested in the DS. Advised pt the the DS results in more malabsorption than the RYGB therefore post-op vitamins are vital. She is not currently taking any vitamins other than the Vit D rx her PCP prescribed that she just finished.  Reviewed proper post-op vitamins to include a bariatric multi and calcium at minimum. Pt was concerned about the cost of the vitamins. Advised that there is a brand that is $99 for a one year supply, but after DS there may be other vitamins she may need to add to her regimen and purchase. Pt appeared concerned about the cost.  Sent pt a [employer] message with recommendations.     Pt scheduled with [facility] provider on 5/7. Pt questioning if that was virtual or in the office.  Appears the visit is in the office. Encouraged pt to keep the visit as an in office visit so we can assess  "weight changes on the medication. Scheduled pt to meet with RD on 6/6.  Can decide if she prefer that in person or virtual closer to appt date.     Revision Level 1 Follow-up Note   6/12/2025  Pt remains on Zepbound - Managed by our [facility] provider, Ludmila Garrett.  Has lost 26#. Eating same foods but notes eating less as per early satiety. Also having side effects like burping, nausea, etc  Recall   B- SB or scrambled eggs with cheese, 1-2 toast  L- Skips (had a home attendant- but left)  D- Chicken leg, rice  Snacks: occ small bag of chips  Fluids: 16 oz but has increased to 2-3 bottles since noted her Urine katy color. Finds coconut water goes down better. Provided other hydration options.    Provided Weight Loss Medication Booklet and reviewed. Stressed protein and fluid intake as well as not skipping meals. Suggested adding Protein drink for lunch with light snack. Pt interested in clear protein products as does not do well with \"milky\" products.   Vitamins - Vitamin D Deficiency - PCP had ordered script and pt completed. Retested 6/10/25 and improved (28) but still insufficient - Advised pt to seek order from PCP to renew  Also Ferritin low - at 10 , iron infusions indicated/ Pt not consistent with taking oral iron  Pt was advised her that s/p open surgical cases there is no guarantee that we will be able to perform a revision safely. Pt to remain in [facility] for 6 months to help with weight loss and reassessed if surgery would be an option    Goals  Eliminate sugar sweetened beverages--continue non-calorie beverages  Food journal via [employer]  Exercise 30 minutes 5 times per week--reach out to PT   Complete lesson plans 1-6  Eat 3 meals per day with protein at each meal  Try some protein shakes and have as a mid-afternoon snack or replace lunch meal   Eliminate mindless snacking  F/U [facility] - appt in September  F/U  - appt in August  Discuss with PANEW re: iron infusions  F/U PCP for Vit D RX    Time Spent:   45 " minutes

## 2025-06-11 NOTE — TELEPHONE ENCOUNTER
LMOM # [phone] for pt to call office @ [phone] or respond to [employer] message sent on 6/10/25. Could not leave a detailed voicemail due to voicemail does not state patients name.

## 2025-06-12 ENCOUNTER — CLINICAL SUPPORT (OUTPATIENT)
Dept: BARIATRICS | Facility: CLINIC | Age: 55
End: 2025-06-12

## 2025-06-12 VITALS — WEIGHT: 293 LBS | BODY MASS INDEX: 60.46 KG/M2

## 2025-06-12 DIAGNOSIS — K91.2 POSTSURGICAL MALABSORPTION: Primary | ICD-10-CM

## 2025-06-12 DIAGNOSIS — E66.01 MORBID OBESITY WITH BMI OF 60.0-69.9, ADULT (HCC): ICD-10-CM

## 2025-06-12 DIAGNOSIS — E55.9 VITAMIN D INSUFFICIENCY: ICD-10-CM

## 2025-06-12 DIAGNOSIS — E66.01 MORBID (SEVERE) OBESITY DUE TO EXCESS CALORIES (HCC): ICD-10-CM

## 2025-06-12 PROCEDURE — RECHECK

## 2025-06-12 RX ORDER — TIRZEPATIDE 5 MG/.5ML
5 INJECTION, SOLUTION SUBCUTANEOUS WEEKLY
Qty: 2 ML | Refills: 0 | Status: SHIPPED | OUTPATIENT
Start: 2025-06-12 | End: 2025-07-10

## 2025-06-12 RX ORDER — ERGOCALCIFEROL 1.25 MG/1
50000 CAPSULE, LIQUID FILLED ORAL
Qty: 8 CAPSULE | Refills: 0 | Status: SHIPPED | OUTPATIENT
Start: 2025-06-12

## 2025-06-12 NOTE — PROGRESS NOTES
Please ask patient obtain repeat labs to assess current iron/ferritin levels and likely need to do iron infusions. I also sent Vitamin D Rx.   Please start taking the vitamin D deficiency prescription that I am sending for 50,000IU 2x/week with FOOD x 4 weeks - take with food for best absorption. We will repeat vitamin D lab in about 4 months. Thank you

## 2025-07-17 ENCOUNTER — PATIENT MESSAGE (OUTPATIENT)
Dept: BARIATRICS | Facility: CLINIC | Age: 55
End: 2025-07-17

## 2025-07-17 DIAGNOSIS — E66.01 MORBID OBESITY WITH BMI OF 60.0-69.9, ADULT (HCC): ICD-10-CM

## 2025-07-17 RX ORDER — TIRZEPATIDE 5 MG/.5ML
5 INJECTION, SOLUTION SUBCUTANEOUS WEEKLY
Qty: 2 ML | Refills: 0 | Status: SHIPPED | OUTPATIENT
Start: 2025-07-17 | End: 2025-07-18

## 2025-07-18 DIAGNOSIS — E66.01 MORBID OBESITY WITH BMI OF 60.0-69.9, ADULT (HCC): Primary | ICD-10-CM

## 2025-07-18 RX ORDER — TIRZEPATIDE 7.5 MG/.5ML
7.5 INJECTION, SOLUTION SUBCUTANEOUS WEEKLY
Qty: 2 ML | Refills: 0 | Status: SHIPPED | OUTPATIENT
Start: 2025-07-18 | End: 2025-08-15

## 2025-07-25 ENCOUNTER — TELEPHONE (OUTPATIENT)
Dept: BARIATRICS | Facility: CLINIC | Age: 55
End: 2025-07-25

## 2025-07-25 NOTE — TELEPHONE ENCOUNTER
Contacted pharmacy to see if patient was able to  latest prescription for Zepbound.  They stated that she wasn't able to as it needs prior authorization.  They said her insurance changed and is now [employer].  Submitted prior authorization Zepbound 7.5MG through Cover My Meds.  Key# [id number].

## 2025-07-31 ENCOUNTER — TELEPHONE (OUTPATIENT)
Dept: BARIATRICS | Facility: CLINIC | Age: 55
End: 2025-07-31

## 2025-08-06 ENCOUNTER — NURSE TRIAGE (OUTPATIENT)
Age: 55
End: 2025-08-06

## 2025-08-06 ENCOUNTER — TELEPHONE (OUTPATIENT)
Age: 55
End: 2025-08-06

## 2025-08-07 DIAGNOSIS — E66.01 MORBID OBESITY WITH BMI OF 60.0-69.9, ADULT (HCC): ICD-10-CM

## 2025-08-07 RX ORDER — TIRZEPATIDE 7.5 MG/.5ML
7.5 INJECTION, SOLUTION SUBCUTANEOUS WEEKLY
Qty: 2 ML | Refills: 0 | Status: SHIPPED | OUTPATIENT
Start: 2025-08-07 | End: 2025-09-04

## 2025-08-20 DIAGNOSIS — E66.01 MORBID OBESITY WITH BMI OF 60.0-69.9, ADULT (HCC): Primary | ICD-10-CM

## 2025-08-20 RX ORDER — TIRZEPATIDE 10 MG/.5ML
10 INJECTION, SOLUTION SUBCUTANEOUS WEEKLY
Qty: 2 ML | Refills: 1 | Status: SHIPPED | OUTPATIENT
Start: 2025-08-20 | End: 2025-10-15